# Patient Record
Sex: MALE | Race: BLACK OR AFRICAN AMERICAN | NOT HISPANIC OR LATINO | Employment: FULL TIME | URBAN - METROPOLITAN AREA
[De-identification: names, ages, dates, MRNs, and addresses within clinical notes are randomized per-mention and may not be internally consistent; named-entity substitution may affect disease eponyms.]

---

## 2019-10-28 ENCOUNTER — TELEPHONE (OUTPATIENT)
Dept: INTERNAL MEDICINE | Facility: CLINIC | Age: 34
End: 2019-10-28

## 2019-10-28 ENCOUNTER — OFFICE VISIT (OUTPATIENT)
Dept: INTERNAL MEDICINE | Facility: CLINIC | Age: 34
End: 2019-10-28
Payer: COMMERCIAL

## 2019-10-28 VITALS
DIASTOLIC BLOOD PRESSURE: 78 MMHG | WEIGHT: 176.56 LBS | SYSTOLIC BLOOD PRESSURE: 118 MMHG | HEIGHT: 70 IN | HEART RATE: 79 BPM | OXYGEN SATURATION: 97 % | BODY MASS INDEX: 25.28 KG/M2

## 2019-10-28 DIAGNOSIS — Z00.00 ENCOUNTER FOR ANNUAL GENERAL MEDICAL EXAMINATION WITHOUT ABNORMAL FINDINGS IN ADULT: Primary | ICD-10-CM

## 2019-10-28 DIAGNOSIS — M25.561 RIGHT KNEE PAIN, UNSPECIFIED CHRONICITY: ICD-10-CM

## 2019-10-28 PROCEDURE — 99999 PR PBB SHADOW E&M-NEW PATIENT-LVL III: ICD-10-PCS | Mod: PBBFAC,,, | Performed by: INTERNAL MEDICINE

## 2019-10-28 PROCEDURE — 99385 PR PREVENTIVE VISIT,NEW,18-39: ICD-10-PCS | Mod: S$GLB,,, | Performed by: INTERNAL MEDICINE

## 2019-10-28 PROCEDURE — 99999 PR PBB SHADOW E&M-NEW PATIENT-LVL III: CPT | Mod: PBBFAC,,, | Performed by: INTERNAL MEDICINE

## 2019-10-28 PROCEDURE — 99385 PREV VISIT NEW AGE 18-39: CPT | Mod: S$GLB,,, | Performed by: INTERNAL MEDICINE

## 2019-10-28 NOTE — PROGRESS NOTES
"    CHIEF COMPLAINT     Chief Complaint   Patient presents with    Establish Care       HPI     Jovannijoselyn Kerr is a 34 y.o. male here today for annual visit.    Overall patient is doing well. Here today to establish for care and address some deferred HM.       R knee pain  Acute injury in March. Reports planning and twisting. Reports hearing a pop at that time. Reports having a significant amount of pain initially. However, pain has improved. His concern is that he is continuing to have some instability and lots of catching and clicking. He has appt with orthopedist on Thursday to further evaluate this issue.    Personally Reviewed Patient's Medical, surgical, family and social hx. Changes updated in Epic.  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   otherwise negative    Health Maintenance:   Reviewed with patient  Due for the following:  Tetanus-     PHYSICAL EXAM     /78 (BP Location: Left arm, Patient Position: Sitting, BP Method: Large (Manual))   Pulse 79   Ht 5' 10" (1.778 m)   Wt 80.1 kg (176 lb 9.4 oz)   SpO2 97%   BMI 25.34 kg/m²     Gen: Well Appearing, NAD  HEENT: PERR, EOMI  Neck: FROM, no thyromegaly, no cervical adenopathy  CVD: RRR, no M/R/G  Pulm: Normal work of breathing, CTAB, no wheezing  Abd:  Soft, NT, ND non TTP, no mass  MSK: r. Knee, non tender, knee stable to varus and valgus strain, Negative Jack, negative lachman  Neuro: A&Ox3, gait normal, speech normal  Mood; Mood normal, behavior normal, thought process linear       LABS     Labs reviewed; Notable for    ASSESSMENT     1. Encounter for annual general medical examination without abnormal findings in adult  CBC auto differential    Comprehensive metabolic panel    Hemoglobin A1c    Lipid panel   2. Right knee pain, unspecified chronicity             Plan     Jovanni " Usha is a 34 y.o. male with  1. Encounter for annual general medical examination without abnormal findings in adult  Updated problem list, medical history, care team and discussed HM.   Will check a1c and lipid screening as age appropriate recs.  Will defer tetanus since not available in clinic today 2/2 recent power outage  Will contact pt with results  - CBC auto differential; Future  - Comprehensive metabolic panel; Future  - Hemoglobin A1c; Future  - Lipid panel; Future    2. Right knee pain, unspecified chronicity  Has ortho evaluation Thursday.    Oscar Ro MD

## 2019-10-28 NOTE — TELEPHONE ENCOUNTER
Called pt and received no answer, thus I left a message asking him if he would be able to make it in for his appt today. I instructed him to follow-up with our office to make another appt if needed and provided him with the number.    Salty Vega

## 2019-11-04 ENCOUNTER — TELEPHONE (OUTPATIENT)
Dept: SPORTS MEDICINE | Facility: CLINIC | Age: 34
End: 2019-11-04

## 2019-11-05 ENCOUNTER — HOSPITAL ENCOUNTER (OUTPATIENT)
Dept: RADIOLOGY | Facility: HOSPITAL | Age: 34
Discharge: HOME | End: 2019-11-05
Attending: ORTHOPAEDIC SURGERY
Payer: COMMERCIAL

## 2019-11-05 ENCOUNTER — OFFICE VISIT (OUTPATIENT)
Dept: SPORTS MEDICINE | Facility: CLINIC | Age: 34
End: 2019-11-05
Payer: COMMERCIAL

## 2019-11-05 VITALS
WEIGHT: 176 LBS | SYSTOLIC BLOOD PRESSURE: 127 MMHG | BODY MASS INDEX: 25.2 KG/M2 | DIASTOLIC BLOOD PRESSURE: 71 MMHG | HEART RATE: 68 BPM | HEIGHT: 70 IN

## 2019-11-05 DIAGNOSIS — M25.561 RIGHT KNEE PAIN, UNSPECIFIED CHRONICITY: ICD-10-CM

## 2019-11-05 DIAGNOSIS — M25.561 RIGHT KNEE PAIN, UNSPECIFIED CHRONICITY: Primary | ICD-10-CM

## 2019-11-05 DIAGNOSIS — M25.551 RIGHT HIP PAIN: ICD-10-CM

## 2019-11-05 PROCEDURE — 99203 PR OFFICE/OUTPT VISIT, NEW, LEVL III, 30-44 MIN: ICD-10-PCS | Mod: S$GLB,,, | Performed by: ORTHOPAEDIC SURGERY

## 2019-11-05 PROCEDURE — 99203 OFFICE O/P NEW LOW 30 MIN: CPT | Mod: S$GLB,,, | Performed by: ORTHOPAEDIC SURGERY

## 2019-11-05 PROCEDURE — 73502 X-RAY EXAM HIP UNI 2-3 VIEWS: CPT | Mod: 26,RT,, | Performed by: RADIOLOGY

## 2019-11-05 PROCEDURE — 73564 X-RAY EXAM KNEE 4 OR MORE: CPT | Mod: 26,50,, | Performed by: RADIOLOGY

## 2019-11-05 PROCEDURE — 73564 X-RAY EXAM KNEE 4 OR MORE: CPT | Mod: TC,50

## 2019-11-05 PROCEDURE — 3008F PR BODY MASS INDEX (BMI) DOCUMENTED: ICD-10-PCS | Mod: CPTII,S$GLB,, | Performed by: ORTHOPAEDIC SURGERY

## 2019-11-05 PROCEDURE — 99999 PR PBB SHADOW E&M-EST. PATIENT-LVL III: ICD-10-PCS | Mod: PBBFAC,,, | Performed by: ORTHOPAEDIC SURGERY

## 2019-11-05 PROCEDURE — 73564 XR KNEE ORTHO BILAT WITH FLEXION: ICD-10-PCS | Mod: 26,50,, | Performed by: RADIOLOGY

## 2019-11-05 PROCEDURE — 73502 XR HIP 2 VIEW RIGHT: ICD-10-PCS | Mod: 26,RT,, | Performed by: RADIOLOGY

## 2019-11-05 PROCEDURE — 73502 X-RAY EXAM HIP UNI 2-3 VIEWS: CPT | Mod: TC,RT

## 2019-11-05 PROCEDURE — 99999 PR PBB SHADOW E&M-EST. PATIENT-LVL III: CPT | Mod: PBBFAC,,, | Performed by: ORTHOPAEDIC SURGERY

## 2019-11-05 PROCEDURE — 3008F BODY MASS INDEX DOCD: CPT | Mod: CPTII,S$GLB,, | Performed by: ORTHOPAEDIC SURGERY

## 2019-11-05 NOTE — PROGRESS NOTES
CC: Right knee instability, right hip pain     34 y.o. Male who presents as a new patient to me. 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 Complaint is right knee instability since last March. States he was playing Frisbee and landed awkwardly with a varus load. Felt a pop and reports subsequent pain and swelling. Did not seek treatment. Pain and swelling resolved after 4-6 weeks. Since the injury - he states knee feels unstable with day to day activity. Unable to return to sports or physical activity due to this. No recurrent effusions. Prominent mechanical symptoms. Denies instability. Better with rest. Treatment thus far has included rest, activity modifications, oral medications. Here today to discuss diagnosis and treatment options.      Also mentions chronic R hip pain dating back to his high school track days. Reports history of an FLEX diagnosis in the past which was treated conservatively.     Pain Score: 0-No pain    REVIEW OF SYSTEMS:   Constitution: Negative. Negative for chills, fever and night sweats.    Hematologic/Lymphatic: Negative for bleeding problem. Does not bruise/bleed easily.   Skin: Negative for dry skin, itching and rash.   Musculoskeletal: Negative for falls. Positive for right knee instability, right hip pain.     All other review of symptoms were reviewed and found to be noncontributory.     PAST MEDICAL HISTORY:   History reviewed. No pertinent past medical history.    PAST SURGICAL HISTORY:   History reviewed. No pertinent surgical history.    FAMILY HISTORY:   Family History   Problem Relation Age of Onset    Hypertension Mother     Stroke Mother 60    Hypothyroidism Mother     Hypertension Father     Tremor Father     Polycystic kidney disease Sister         transplant as kid       SOCIAL HISTORY:   Social History     Socioeconomic History    Marital status: Single     Spouse name: Not on file    Number of children: Not on file    Years of education: Not on file    Highest education  "level: Not on file   Occupational History    Occupation: Fellow     Comment: [employer]   Social Needs    Financial resource strain: Not on file    Food insecurity:     Worry: Not on file     Inability: Not on file    Transportation needs:     Medical: Not on file     Non-medical: Not on file   Tobacco Use    Smoking status: Never Smoker    Smokeless tobacco: Never Used   Substance and Sexual Activity    Alcohol use: Yes     Frequency: 2-4 times a month     Drinks per session: 1 or 2    Drug use: Never    Sexual activity: Not on file   Lifestyle    Physical activity:     Days per week: Not on file     Minutes per session: Not on file    Stress: Not on file   Relationships    Social connections:     Talks on phone: Not on file     Gets together: Not on file     Attends Restorationism service: Not on file     Active member of club or organization: Not on file     Attends meetings of clubs or organizations: Not on file     Relationship status: Not on file   Other Topics Concern    Not on file   Social History Narrative    Not on file     MEDICATIONS:   No current outpatient medications on file.    ALLERGIES:   Review of patient's allergies indicates:  No Known Allergies     PHYSICAL EXAMINATION:  /71   Pulse 68   Ht 5' 10" (1.778 m)   Wt 79.8 kg (176 lb)   BMI 25.25 kg/m²   General: Well-developed well-nourished 34 y.o. malein no acute distress   Cardiovascular: Regular rhythm by palpation of distal pulse, normal color and temperature, no concerning varicosities on symptomatic side   Lungs: No labored breathing or wheezing appreciated   Neuro: Alert and oriented ×3   Psychiatric: well oriented to person, place and time, demonstrates normal mood and affect   Skin: No rashes, lesions or ulcers, normal temperature, turgor, and texture on involved extremity    Ortho/SPM Exam  Examination of the right knee demonstrates intact extensor mechanism. No effusion or prepatellar swelling. Central " patellar tracking. No patellar apprehension. Normal patellar mobility. Full extension. Flexion to 130. No pain with forced flexion or extension. No tenderness along the medial and lateral joint line. Positive Jack's for generalized pain. Positive Lachman, 2B. Randolph with pivot shift testing. Stable to varus/valgus stress testing at 0 and 30 deg. Negative posterior drawer. Negative Dial. NVI distally.     Examination of the right hip demonstrates +FADIR. Restricted IR to 25. +Circumduction test for pain. No mechanical symptoms. Negative logroll.    IMAGING:  X-rays including standing, weight bearing AP and flexion bilateral knees, RIGHT knee lateral and sunrise views ordered and images reviewed by me show:    No acute change. Well maintained joint spaces.     X-rays including AP/Lateral of the right hip ordered and reviewed by me. Images show:   There is mild impingement change    ASSESSMENT:      ICD-10-CM ICD-9-CM   1. Right knee pain, unspecified chronicity M25.561 719.46   2. Right hip pain M25.551 719.45    Suspected right knee ACL tear   Right FLEX    PLAN:     Findings discussed with the patient.  MRI of the right knee is indicated.  He has had chronic pain in regards to the hip as well. Known diagnosis of FLEX.  He failed prior conservative treatment.  He wishes to proceed with an MRI of the hip as well. This was ordered.  There is no correlation between FLEX I and ACL tears.  I will touch base with him via telephone after the studies to discuss results and treatment options      Procedures

## 2019-11-12 ENCOUNTER — HOSPITAL ENCOUNTER (OUTPATIENT)
Dept: RADIOLOGY | Facility: HOSPITAL | Age: 34
Discharge: HOME | End: 2019-11-12
Attending: ORTHOPAEDIC SURGERY
Payer: COMMERCIAL

## 2019-11-12 DIAGNOSIS — M25.561 RIGHT KNEE PAIN, UNSPECIFIED CHRONICITY: ICD-10-CM

## 2019-11-12 PROCEDURE — 73721 MRI JNT OF LWR EXTRE W/O DYE: CPT | Mod: TC,RT

## 2019-11-12 PROCEDURE — 73721 MRI JNT OF LWR EXTRE W/O DYE: CPT | Mod: 26,RT,, | Performed by: RADIOLOGY

## 2019-11-12 PROCEDURE — 73721 MRI KNEE WITHOUT CONTRAST RIGHT: ICD-10-PCS | Mod: 26,RT,, | Performed by: RADIOLOGY

## 2019-11-14 ENCOUNTER — ANESTHESIA EVENT (OUTPATIENT)
Dept: SURGERY | Facility: HOSPITAL | Age: 34
End: 2019-11-14
Payer: COMMERCIAL

## 2019-11-14 ENCOUNTER — TELEPHONE (OUTPATIENT)
Dept: SPORTS MEDICINE | Facility: CLINIC | Age: 34
End: 2019-11-14

## 2019-11-14 DIAGNOSIS — S83.511A RUPTURE OF ANTERIOR CRUCIATE LIGAMENT OF RIGHT KNEE, INITIAL ENCOUNTER: Primary | ICD-10-CM

## 2019-11-14 NOTE — PLAN OF CARE
Patient instructions given per surgical and medical guidelines. NPO status reviewed with patient. Patient verbalized understanding of both food and fluid intake prior to surgery.  Antibacterial scrub instructions given per MD recommendations.  All questions answered.  Driving directions given for day of surgery.  .

## 2019-11-14 NOTE — ANESTHESIA PREPROCEDURE EVALUATION
11/14/2019  Jovanni Kerr is a 34 y.o., male.    Anesthesia Evaluation    I have reviewed the Patient Summary Reports.    I have reviewed the Nursing Notes.      Review of Systems  Anesthesia Hx:  No previous Anesthesia  Neg history of prior surgery. Denies Family Hx of Anesthesia complications.   Denies Personal Hx of Anesthesia complications.   Social:  Social Alcohol Use  Denies Tobacco Use.   EENT/Dental:   Denies ear symptoms  Denies Nasal Symptoms.  Denies Active Dental Problems    Cardiovascular:  Cardiovascular Normal   Functional Capacity 4 METS  Denies Cardiomyopathy  Denies Deep Venous Thrombosis (DVT)  Denies Carotoid Artery Disease    Pulmonary:  Denies Asthma.    Renal/:  Denies Renal Symptoms/Infections/Stones    Hepatic/GI:  Denies Hepatic/GI Symptoms    Musculoskeletal:  Musculoskeletal Normal  Denies Bone Disorder    Neurological:  Neurology Normal  Denies Seizure Disorder  Denies Head Injury  Denies Spinal Cord Injury Denies Nerve Injury   Endocrine:  Denies Diabetes  Denies Thyroid Disease   Denies Pituitary Disease    Dermatological:  Denies Skin Symptoms/Problems   Psych:  Denies Attention Deficit Disorder .         Physical Exam  General:  Well nourished    Airway/Jaw/Neck:  Airway Findings: Mouth Opening: Normal Tongue: Normal  General Airway Assessment: Average  Mallampati: I  TM Distance: Normal, at least 6 cm  Jaw/Neck Findings:  Neck ROM: Normal ROM            Mental Status:  Mental Status Findings:  Alert and Oriented         Anesthesia Plan  Type of Anesthesia, risks & benefits discussed:  Anesthesia Type:  general, MAC, regional  Patient's Preference:   Intra-op Monitoring Plan: standard ASA monitors  Intra-op Monitoring Plan Comments:   Post Op Pain Control Plan:   Post Op Pain Control Plan Comments:   Induction:   IV  Beta Blocker:  Patient is not currently on a  Beta-Blocker (No further documentation required).       Informed Consent: Patient understands risks and agrees with Anesthesia plan.  Questions answered. Anesthesia consent signed with patient.  ASA Score: 1     Day of Surgery Review of History & Physical:    H&P update referred to the surgeon.         Ready For Surgery From Anesthesia Perspective.

## 2019-11-14 NOTE — TELEPHONE ENCOUNTER
----- Message from Sandra Chavira sent at 11/14/2019 11:49 AM CST -----  Contact: Self  Pt is needing a call back regarding upcoming surgery @ [phone]

## 2019-11-15 ENCOUNTER — TELEPHONE (OUTPATIENT)
Dept: SPORTS MEDICINE | Facility: CLINIC | Age: 34
End: 2019-11-15

## 2019-11-15 NOTE — TELEPHONE ENCOUNTER
I called and discussed the MRI results with the patient yesterday.  The study confirms a complete ACL tear which appears more chronic in variety.  Treatment options were discussed.  The patient has symptomatic instability episodes on a regular basis with activities of daily living and does have a desire to return to some recreational sports.  From that perspective certainly I would recommend surgical intervention. Graft options discussed.  Autograft recommended.  The patient has selected a bone-patellar tendon-bone autograft.    Plan is for right knee arthroscopic bone-patellar tendon-bone autograft ACL reconstruction with meniscus treatment as indicated.    The details of surgery were discussed to include the expected postop rehab and recovery course.  In particular I have discussed the risks of surgery which include graft retear or nonhealing, anterior knee pain, fracture, quadriceps weakness, contralateral ACL injury, inability return to previous level activity, arthritis, infection, DVT/PE, and potential need for further surgery among others.  The patient verbalized understanding and does wish to proceed. In fact, he wishes to proceed next Monday.  We will get him set up for this.  All questions answered.  No preoperative medical clearance needed.    Informed Consent:    The details of the surgical procedure were explained, including the location of probable incisions and a description of possible hardware and/or grafts to be used. Alternatives to both operative and non-operative options with associated risks and benefits were discussed. The patient understands the likely convalescence after surgery and, in particular, the expected postop rehab and recovery course. The outlined risks and potential complications of the proposed procedure include but are not limited to: infection, poor wound healing, scarring, deformity, stiffness, swelling, continued or recurrent pain, instability, hardware or prosthetic failure  if implanted, symptomatic hardware requiring removal, dislocation, weakness, neurovascular injury, numbness, chronic regional pain disorder, tissue nonhealing/irreparability/retear, subsequent contralateral limb injury or pathology, chondral injury, arthritis, fracture, blood clot formation, inability to return to previous level of activity, anesthetic or regional block complication up to death, need for additional procedure as indicated intraoperatively, and potential need for further surgery.    The patient was also informed and understands that the risks of surgery are greater for patients with a current condition or history of heart disease, obesity, clotting disorders, recurrent infections, steroid use, current or past smoking, and factors such as sedentary lifestyle and noncompliance with medications, therapy or follow-up. The degree of the increased risk is hard to estimate with any degree of precision. If applicable, smoking cessation was discussed.     All questions were answered. The patient has verbalized understanding of these issues and wishes to proceed with the surgery as discussed.

## 2019-11-18 ENCOUNTER — HOSPITAL ENCOUNTER (OUTPATIENT)
Facility: HOSPITAL | Age: 34
Discharge: HOME | End: 2019-11-18
Attending: ORTHOPAEDIC SURGERY | Admitting: ORTHOPAEDIC SURGERY
Payer: COMMERCIAL

## 2019-11-18 ENCOUNTER — DOCUMENTATION ONLY (OUTPATIENT)
Dept: SPORTS MEDICINE | Facility: CLINIC | Age: 34
End: 2019-11-18

## 2019-11-18 ENCOUNTER — ANESTHESIA (OUTPATIENT)
Dept: SURGERY | Facility: HOSPITAL | Age: 34
End: 2019-11-18
Payer: COMMERCIAL

## 2019-11-18 DIAGNOSIS — S83.519A ACL TEAR: ICD-10-CM

## 2019-11-18 DIAGNOSIS — Z98.890 S/P ACL RECONSTRUCTION: Primary | ICD-10-CM

## 2019-11-18 DIAGNOSIS — S83.511A RUPTURE OF ANTERIOR CRUCIATE LIGAMENT OF RIGHT KNEE, INITIAL ENCOUNTER: Primary | ICD-10-CM

## 2019-11-18 PROCEDURE — D9220A PRA ANESTHESIA: ICD-10-PCS | Mod: ANES,,, | Performed by: ANESTHESIOLOGY

## 2019-11-18 PROCEDURE — 64448 NJX AA&/STRD FEM NRV NFS IMG: CPT | Mod: 59,RT,, | Performed by: ANESTHESIOLOGY

## 2019-11-18 PROCEDURE — C1751 CATH, INF, PER/CENT/MIDLINE: HCPCS

## 2019-11-18 PROCEDURE — 63600175 PHARM REV CODE 636 W HCPCS: Performed by: ORTHOPAEDIC SURGERY

## 2019-11-18 PROCEDURE — 64448 ACC: ICD-10-PCS | Mod: 59,RT,, | Performed by: ANESTHESIOLOGY

## 2019-11-18 PROCEDURE — 29873 PR KNEE SCOPE, W/LATERAL RELEASE: ICD-10-PCS | Mod: 51,RT,, | Performed by: ORTHOPAEDIC SURGERY

## 2019-11-18 PROCEDURE — D9220A PRA ANESTHESIA: Mod: ANES,,, | Performed by: ANESTHESIOLOGY

## 2019-11-18 PROCEDURE — 25000003 PHARM REV CODE 250: Performed by: NURSE ANESTHETIST, CERTIFIED REGISTERED

## 2019-11-18 PROCEDURE — 94761 N-INVAS EAR/PLS OXIMETRY MLT: CPT

## 2019-11-18 PROCEDURE — D9220A PRA ANESTHESIA: Mod: CRNA,,, | Performed by: NURSE ANESTHETIST, CERTIFIED REGISTERED

## 2019-11-18 PROCEDURE — 76942 ECHO GUIDE FOR BIOPSY: CPT | Performed by: ANESTHESIOLOGY

## 2019-11-18 PROCEDURE — 25000003 PHARM REV CODE 250: Performed by: ANESTHESIOLOGY

## 2019-11-18 PROCEDURE — 37000008 HC ANESTHESIA 1ST 15 MINUTES: Performed by: ORTHOPAEDIC SURGERY

## 2019-11-18 PROCEDURE — 29888 PR KNEE SCOPE,AID ANT CRUCIATE REPAIR: ICD-10-PCS | Mod: RT,,, | Performed by: ORTHOPAEDIC SURGERY

## 2019-11-18 PROCEDURE — S0020 INJECTION, BUPIVICAINE HYDRO: HCPCS | Performed by: ANESTHESIOLOGY

## 2019-11-18 PROCEDURE — 36000710: Performed by: ORTHOPAEDIC SURGERY

## 2019-11-18 PROCEDURE — 63600175 PHARM REV CODE 636 W HCPCS: Performed by: NURSE ANESTHETIST, CERTIFIED REGISTERED

## 2019-11-18 PROCEDURE — 71000033 HC RECOVERY, INTIAL HOUR: Performed by: ORTHOPAEDIC SURGERY

## 2019-11-18 PROCEDURE — 71000039 HC RECOVERY, EACH ADD'L HOUR: Performed by: ORTHOPAEDIC SURGERY

## 2019-11-18 PROCEDURE — 29873 ARTHRS KNEE SURG W/LAT RLS: CPT | Mod: 51,RT,, | Performed by: ORTHOPAEDIC SURGERY

## 2019-11-18 PROCEDURE — 25000003 PHARM REV CODE 250: Performed by: ORTHOPAEDIC SURGERY

## 2019-11-18 PROCEDURE — 99900035 HC TECH TIME PER 15 MIN (STAT)

## 2019-11-18 PROCEDURE — 71000015 HC POSTOP RECOV 1ST HR: Performed by: ORTHOPAEDIC SURGERY

## 2019-11-18 PROCEDURE — 27100025 HC TUBING, SET FLUID WARMER: Performed by: NURSE ANESTHETIST, CERTIFIED REGISTERED

## 2019-11-18 PROCEDURE — 64450 IPACK: ICD-10-PCS | Mod: 59,RT,, | Performed by: ANESTHESIOLOGY

## 2019-11-18 PROCEDURE — 64450 NJX AA&/STRD OTHER PN/BRANCH: CPT | Performed by: ANESTHESIOLOGY

## 2019-11-18 PROCEDURE — 27200651 HC AIRWAY, LMA: Performed by: NURSE ANESTHETIST, CERTIFIED REGISTERED

## 2019-11-18 PROCEDURE — 27200750 HC INSULATED NEEDLE/ STIMUPLEX

## 2019-11-18 PROCEDURE — 29888 ARTHRS AID ACL RPR/AGMNTJ: CPT | Mod: RT,,, | Performed by: ORTHOPAEDIC SURGERY

## 2019-11-18 PROCEDURE — D9220A PRA ANESTHESIA: ICD-10-PCS | Mod: CRNA,,, | Performed by: NURSE ANESTHETIST, CERTIFIED REGISTERED

## 2019-11-18 PROCEDURE — 25000003 PHARM REV CODE 250

## 2019-11-18 PROCEDURE — 36000711: Performed by: ORTHOPAEDIC SURGERY

## 2019-11-18 PROCEDURE — 76942 ACC: ICD-10-PCS | Mod: 26,,, | Performed by: ANESTHESIOLOGY

## 2019-11-18 PROCEDURE — 27201423 OPTIME MED/SURG SUP & DEVICES STERILE SUPPLY: Performed by: ORTHOPAEDIC SURGERY

## 2019-11-18 PROCEDURE — C1713 ANCHOR/SCREW BN/BN,TIS/BN: HCPCS | Performed by: ORTHOPAEDIC SURGERY

## 2019-11-18 PROCEDURE — 37000009 HC ANESTHESIA EA ADD 15 MINS: Performed by: ORTHOPAEDIC SURGERY

## 2019-11-18 PROCEDURE — 63600175 PHARM REV CODE 636 W HCPCS: Performed by: ANESTHESIOLOGY

## 2019-11-18 PROCEDURE — 27800903 OPTIME MED/SURG SUP & DEVICES OTHER IMPLANTS: Performed by: ORTHOPAEDIC SURGERY

## 2019-11-18 DEVICE — SCREW CANNULATED 9 X 20MM: Type: IMPLANTABLE DEVICE | Site: KNEE | Status: FUNCTIONAL

## 2019-11-18 DEVICE — KIT SECONDARY FIX ACL PCL REP: Type: IMPLANTABLE DEVICE | Site: KNEE | Status: FUNCTIONAL

## 2019-11-18 DEVICE — FIBER CORTICAL ENHANCE 2.5CC: Type: IMPLANTABLE DEVICE | Site: KNEE | Status: FUNCTIONAL

## 2019-11-18 DEVICE — SCREW SHTH CANN INTFR 8X20MM: Type: IMPLANTABLE DEVICE | Site: KNEE | Status: FUNCTIONAL

## 2019-11-18 RX ORDER — ONDANSETRON 4 MG/1
4 TABLET, FILM COATED ORAL EVERY 8 HOURS PRN
Qty: 30 TABLET | Refills: 0 | Status: ON HOLD | OUTPATIENT
Start: 2019-11-18 | End: 2020-10-09 | Stop reason: HOSPADM

## 2019-11-18 RX ORDER — VANCOMYCIN HYDROCHLORIDE 500 MG/10ML
INJECTION, POWDER, LYOPHILIZED, FOR SOLUTION INTRAVENOUS
Status: DISCONTINUED | OUTPATIENT
Start: 2019-11-18 | End: 2019-11-18 | Stop reason: HOSPADM

## 2019-11-18 RX ORDER — BUPIVACAINE HYDROCHLORIDE 2.5 MG/ML
INJECTION, SOLUTION EPIDURAL; INFILTRATION; INTRACAUDAL
Status: DISCONTINUED
Start: 2019-11-18 | End: 2019-11-18 | Stop reason: HOSPADM

## 2019-11-18 RX ORDER — TRANEXAMIC ACID 100 MG/ML
INJECTION, SOLUTION INTRAVENOUS
Status: DISCONTINUED | OUTPATIENT
Start: 2019-11-18 | End: 2019-11-18

## 2019-11-18 RX ORDER — MORPHINE SULFATE 2 MG/ML
2 INJECTION, SOLUTION INTRAMUSCULAR; INTRAVENOUS EVERY 10 MIN PRN
Status: DISCONTINUED | OUTPATIENT
Start: 2019-11-18 | End: 2019-11-18 | Stop reason: HOSPADM

## 2019-11-18 RX ORDER — ONDANSETRON 2 MG/ML
INJECTION INTRAMUSCULAR; INTRAVENOUS
Status: DISCONTINUED | OUTPATIENT
Start: 2019-11-18 | End: 2019-11-18

## 2019-11-18 RX ORDER — MIDAZOLAM HYDROCHLORIDE 1 MG/ML
0.5 INJECTION INTRAMUSCULAR; INTRAVENOUS
Status: DISCONTINUED | OUTPATIENT
Start: 2019-11-18 | End: 2019-11-18 | Stop reason: HOSPADM

## 2019-11-18 RX ORDER — BUPIVACAINE HYDROCHLORIDE 2.5 MG/ML
INJECTION, SOLUTION INFILTRATION; PERINEURAL
Status: COMPLETED | OUTPATIENT
Start: 2019-11-18 | End: 2019-11-18

## 2019-11-18 RX ORDER — CELECOXIB 200 MG/1
400 CAPSULE ORAL
Status: COMPLETED | OUTPATIENT
Start: 2019-11-18 | End: 2019-11-18

## 2019-11-18 RX ORDER — MIDAZOLAM HYDROCHLORIDE 1 MG/ML
INJECTION, SOLUTION INTRAMUSCULAR; INTRAVENOUS
Status: DISCONTINUED | OUTPATIENT
Start: 2019-11-18 | End: 2019-11-18

## 2019-11-18 RX ORDER — ONDANSETRON 2 MG/ML
4 INJECTION INTRAMUSCULAR; INTRAVENOUS EVERY 12 HOURS PRN
Status: DISCONTINUED | OUTPATIENT
Start: 2019-11-18 | End: 2019-11-18 | Stop reason: HOSPADM

## 2019-11-18 RX ORDER — OXYCODONE HYDROCHLORIDE 5 MG/1
TABLET ORAL
Status: COMPLETED
Start: 2019-11-18 | End: 2019-11-18

## 2019-11-18 RX ORDER — DEXMEDETOMIDINE HYDROCHLORIDE 100 UG/ML
INJECTION, SOLUTION INTRAVENOUS
Status: DISCONTINUED | OUTPATIENT
Start: 2019-11-18 | End: 2019-11-18

## 2019-11-18 RX ORDER — LIDOCAINE HCL/PF 100 MG/5ML
SYRINGE (ML) INTRAVENOUS
Status: DISCONTINUED | OUTPATIENT
Start: 2019-11-18 | End: 2019-11-18

## 2019-11-18 RX ORDER — EPINEPHRINE 1 MG/ML
INJECTION INTRAMUSCULAR; INTRAVENOUS; SUBCUTANEOUS
Status: DISCONTINUED | OUTPATIENT
Start: 2019-11-18 | End: 2019-11-18 | Stop reason: HOSPADM

## 2019-11-18 RX ORDER — PROPOFOL 10 MG/ML
VIAL (ML) INTRAVENOUS
Status: DISCONTINUED | OUTPATIENT
Start: 2019-11-18 | End: 2019-11-18

## 2019-11-18 RX ORDER — HYDROMORPHONE HYDROCHLORIDE 1 MG/ML
0.2 INJECTION, SOLUTION INTRAMUSCULAR; INTRAVENOUS; SUBCUTANEOUS EVERY 5 MIN PRN
Status: DISCONTINUED | OUTPATIENT
Start: 2019-11-18 | End: 2019-11-18 | Stop reason: HOSPADM

## 2019-11-18 RX ORDER — DEXAMETHASONE SODIUM PHOSPHATE 4 MG/ML
INJECTION, SOLUTION INTRA-ARTICULAR; INTRALESIONAL; INTRAMUSCULAR; INTRAVENOUS; SOFT TISSUE
Status: DISCONTINUED | OUTPATIENT
Start: 2019-11-18 | End: 2019-11-18

## 2019-11-18 RX ORDER — PROMETHAZINE HYDROCHLORIDE 25 MG/1
25 TABLET ORAL EVERY 6 HOURS PRN
Status: DISCONTINUED | OUTPATIENT
Start: 2019-11-18 | End: 2019-11-18 | Stop reason: HOSPADM

## 2019-11-18 RX ORDER — MUPIROCIN 20 MG/G
OINTMENT TOPICAL
Status: DISCONTINUED | OUTPATIENT
Start: 2019-11-18 | End: 2019-11-18 | Stop reason: HOSPADM

## 2019-11-18 RX ORDER — LORAZEPAM 2 MG/ML
0.25 INJECTION INTRAMUSCULAR ONCE AS NEEDED
Status: DISCONTINUED | OUTPATIENT
Start: 2019-11-18 | End: 2019-11-18 | Stop reason: HOSPADM

## 2019-11-18 RX ORDER — DIPHENHYDRAMINE HYDROCHLORIDE 50 MG/ML
25 INJECTION INTRAMUSCULAR; INTRAVENOUS EVERY 6 HOURS PRN
Status: DISCONTINUED | OUTPATIENT
Start: 2019-11-18 | End: 2019-11-18 | Stop reason: HOSPADM

## 2019-11-18 RX ORDER — DEXAMETHASONE SODIUM PHOSPHATE 10 MG/ML
INJECTION INTRAMUSCULAR; INTRAVENOUS
Status: DISCONTINUED
Start: 2019-11-18 | End: 2019-11-18 | Stop reason: HOSPADM

## 2019-11-18 RX ORDER — SODIUM CHLORIDE 9 MG/ML
INJECTION, SOLUTION INTRAVENOUS CONTINUOUS
Status: DISCONTINUED | OUTPATIENT
Start: 2019-11-18 | End: 2019-11-18 | Stop reason: HOSPADM

## 2019-11-18 RX ORDER — SODIUM CHLORIDE 9 MG/ML
INJECTION, SOLUTION INTRAVENOUS CONTINUOUS
Status: CANCELLED | OUTPATIENT
Start: 2019-11-18

## 2019-11-18 RX ORDER — GLYCOPYRROLATE 0.2 MG/ML
INJECTION INTRAMUSCULAR; INTRAVENOUS
Status: DISCONTINUED | OUTPATIENT
Start: 2019-11-18 | End: 2019-11-18

## 2019-11-18 RX ORDER — OXYCODONE AND ACETAMINOPHEN 5; 325 MG/1; MG/1
1 TABLET ORAL
Qty: 28 TABLET | Refills: 0 | Status: ON HOLD | OUTPATIENT
Start: 2019-11-18 | End: 2020-10-09 | Stop reason: HOSPADM

## 2019-11-18 RX ORDER — OXYCODONE HYDROCHLORIDE 5 MG/1
10 TABLET ORAL EVERY 4 HOURS PRN
Status: DISCONTINUED | OUTPATIENT
Start: 2019-11-18 | End: 2019-11-18 | Stop reason: HOSPADM

## 2019-11-18 RX ORDER — BUPIVACAINE HYDROCHLORIDE 2.5 MG/ML
INJECTION, SOLUTION EPIDURAL; INFILTRATION; INTRACAUDAL
Status: DISCONTINUED | OUTPATIENT
Start: 2019-11-18 | End: 2019-11-18 | Stop reason: HOSPADM

## 2019-11-18 RX ORDER — OXYCODONE HYDROCHLORIDE 5 MG/1
5-10 TABLET ORAL EVERY 6 HOURS PRN
Qty: 28 TABLET | Refills: 0 | Status: CANCELLED | OUTPATIENT
Start: 2019-11-18

## 2019-11-18 RX ORDER — KETAMINE HCL IN 0.9 % NACL 50 MG/5 ML
SYRINGE (ML) INTRAVENOUS
Status: DISCONTINUED | OUTPATIENT
Start: 2019-11-18 | End: 2019-11-18

## 2019-11-18 RX ORDER — MUPIROCIN 20 MG/G
OINTMENT TOPICAL
Status: CANCELLED | OUTPATIENT
Start: 2019-11-18

## 2019-11-18 RX ORDER — CEFAZOLIN SODIUM 1 G/3ML
2 INJECTION, POWDER, FOR SOLUTION INTRAMUSCULAR; INTRAVENOUS
Status: DISCONTINUED | OUTPATIENT
Start: 2019-11-18 | End: 2019-11-18 | Stop reason: HOSPADM

## 2019-11-18 RX ORDER — FENTANYL CITRATE 50 UG/ML
25 INJECTION, SOLUTION INTRAMUSCULAR; INTRAVENOUS EVERY 5 MIN PRN
Status: DISCONTINUED | OUTPATIENT
Start: 2019-11-18 | End: 2019-11-18 | Stop reason: HOSPADM

## 2019-11-18 RX ORDER — ASPIRIN 81 MG/1
81 TABLET ORAL 2 TIMES DAILY
Qty: 28 TABLET | Refills: 0 | OUTPATIENT
Start: 2019-11-18 | End: 2019-12-02

## 2019-11-18 RX ORDER — ACETAMINOPHEN 500 MG
1000 TABLET ORAL
Status: COMPLETED | OUTPATIENT
Start: 2019-11-18 | End: 2019-11-18

## 2019-11-18 RX ADMIN — PROPOFOL 200 MG: 10 INJECTION, EMULSION INTRAVENOUS at 03:11

## 2019-11-18 RX ADMIN — LIDOCAINE HYDROCHLORIDE 100 MG: 20 INJECTION, SOLUTION INTRAVENOUS at 03:11

## 2019-11-18 RX ADMIN — ACETAMINOPHEN 1000 MG: 500 TABLET ORAL at 01:11

## 2019-11-18 RX ADMIN — MIDAZOLAM HYDROCHLORIDE 2 MG: 1 INJECTION, SOLUTION INTRAMUSCULAR; INTRAVENOUS at 02:11

## 2019-11-18 RX ADMIN — CELECOXIB 400 MG: 200 CAPSULE ORAL at 01:11

## 2019-11-18 RX ADMIN — TRANEXAMIC ACID 1000 MG: 100 INJECTION, SOLUTION INTRAVENOUS at 03:11

## 2019-11-18 RX ADMIN — FENTANYL CITRATE 100 MCG: 50 INJECTION, SOLUTION INTRAMUSCULAR; INTRAVENOUS at 02:11

## 2019-11-18 RX ADMIN — BUPIVACAINE HYDROCHLORIDE 20 ML: 2.5 INJECTION, SOLUTION INFILTRATION; PERINEURAL at 02:11

## 2019-11-18 RX ADMIN — ROPIVACAINE HYDROCHLORIDE: 2 INJECTION, SOLUTION EPIDURAL; INFILTRATION at 06:11

## 2019-11-18 RX ADMIN — GLYCOPYRROLATE 0.2 MG: 0.2 INJECTION, SOLUTION INTRAMUSCULAR; INTRAVENOUS at 03:11

## 2019-11-18 RX ADMIN — Medication 50 MG: at 03:11

## 2019-11-18 RX ADMIN — DEXMEDETOMIDINE HYDROCHLORIDE 10 MCG: 100 INJECTION, SOLUTION, CONCENTRATE INTRAVENOUS at 03:11

## 2019-11-18 RX ADMIN — DEXAMETHASONE SODIUM PHOSPHATE 8 MG: 4 INJECTION, SOLUTION INTRAMUSCULAR; INTRAVENOUS at 03:11

## 2019-11-18 RX ADMIN — OXYCODONE HYDROCHLORIDE 10 MG: 5 TABLET ORAL at 07:11

## 2019-11-18 RX ADMIN — ONDANSETRON 4 MG: 2 INJECTION INTRAMUSCULAR; INTRAVENOUS at 05:11

## 2019-11-18 RX ADMIN — DEXMEDETOMIDINE HYDROCHLORIDE 10 MCG: 100 INJECTION, SOLUTION, CONCENTRATE INTRAVENOUS at 04:11

## 2019-11-18 RX ADMIN — SODIUM CHLORIDE, SODIUM GLUCONATE, SODIUM ACETATE, POTASSIUM CHLORIDE, MAGNESIUM CHLORIDE, SODIUM PHOSPHATE, DIBASIC, AND POTASSIUM PHOSPHATE: .53; .5; .37; .037; .03; .012; .00082 INJECTION, SOLUTION INTRAVENOUS at 03:11

## 2019-11-18 RX ADMIN — MIDAZOLAM 2 MG: 1 INJECTION INTRAMUSCULAR; INTRAVENOUS at 03:11

## 2019-11-18 RX ADMIN — SODIUM CHLORIDE 1000 ML: 0.9 INJECTION, SOLUTION INTRAVENOUS at 01:11

## 2019-11-18 RX ADMIN — CEFAZOLIN 2 G: 330 INJECTION, POWDER, FOR SOLUTION INTRAMUSCULAR; INTRAVENOUS at 03:11

## 2019-11-18 NOTE — INTERVAL H&P NOTE
The patient has been examined and the H&P has been reviewed:    I concur with the findings and no changes have occurred since H&P was written.    Anesthesia/Surgery risks, benefits and alternative options discussed and understood by patient/family.          Active Hospital Problems    Diagnosis  POA    ACL tear [S83.519A]  Yes      Resolved Hospital Problems   No resolved problems to display.

## 2019-11-18 NOTE — H&P (VIEW-ONLY)
Jovanni Kerr  is here for a completion of his perioperative paperwork. he  Is scheduled to undergo R knee ACL reconstruction with BTB autograft on 11/18/19.  He is a healthy individual and does not need clearance for this procedure.     Risks, indications and benefits of the surgical procedure were discussed with the patient. All questions with regard to surgery, rehab, expected return to functional activities, activities of daily living and recreational endeavors were answered to his satisfaction.    Patient was informed and understands the risks of surgery are greater for patients with a current condition or hx of heart disease, obesity, clotting disorders, recurrent infections, steroid use, current or past smoking, and factors such as sedentary lifestyle and noncompliance with medications, therapy or f/u. The degree of the increased risk is hard to estimate w/ any degree of precision.    Once no other questions were asked, a brief history and physical exam was then performed.    PAST MEDICAL HISTORY: No past medical history on file.  PAST SURGICAL HISTORY: No past surgical history on file.  FAMILY HISTORY:   Family History   Problem Relation Age of Onset    Hypertension Mother     Stroke Mother 60    Hypothyroidism Mother     Hypertension Father     Tremor Father     Polycystic kidney disease Sister         transplant as kid     SOCIAL HISTORY:   Social History     Socioeconomic History    Marital status: Single     Spouse name: Not on file    Number of children: Not on file    Years of education: Not on file    Highest education level: Not on file   Occupational History    Occupation: Fellow     Comment: [employer]   Social Needs    Financial resource strain: Not on file    Food insecurity:     Worry: Not on file     Inability: Not on file    Transportation needs:     Medical: Not on file     Non-medical: Not on file   Tobacco Use    Smoking status: Never Smoker    Smokeless tobacco:  Never Used   Substance and Sexual Activity    Alcohol use: Yes     Frequency: 2-4 times a month     Drinks per session: 1 or 2    Drug use: Never    Sexual activity: Not on file   Lifestyle    Physical activity:     Days per week: Not on file     Minutes per session: Not on file    Stress: Not on file   Relationships    Social connections:     Talks on phone: Not on file     Gets together: Not on file     Attends Taoist service: Not on file     Active member of club or organization: Not on file     Attends meetings of clubs or organizations: Not on file     Relationship status: Not on file   Other Topics Concern    Not on file   Social History Narrative    Not on file       MEDICATIONS: No current outpatient medications on file.  ALLERGIES: Review of patient's allergies indicates:  No Known Allergies    Review of Systems   Constitution: Negative. Negative for chills, fever and night sweats.   HENT: Negative for congestion and headaches.    Eyes: Negative for blurred vision, left vision loss and right vision loss.   Cardiovascular: Negative for chest pain and syncope.   Respiratory: Negative for cough and shortness of breath.    Endocrine: Negative for polydipsia, polyphagia and polyuria.   Hematologic/Lymphatic: Negative for bleeding problem. Does not bruise/bleed easily.   Skin: Negative for dry skin, itching and rash.   Musculoskeletal: Negative for falls and muscle weakness.   Gastrointestinal: Negative for abdominal pain and bowel incontinence.   Genitourinary: Negative for bladder incontinence and nocturia.   Neurological: Negative for disturbances in coordination, loss of balance and seizures.   Psychiatric/Behavioral: Negative for depression. The patient does not have insomnia.    Allergic/Immunologic: Negative for hives and persistent infections.     PHYSICAL EXAM:  GEN: A&Ox3, WD WN NAD  HEENT: WNL  CHEST: CTAB, no W/R/R  HEART: RRR, no M/R/G   ABD: Soft, NT ND, BS x4 QUADS  MS: Refer to previous  note for detailed MS exam  NEURO: CN II-XII intact       The surgical consent was then reviewed with the patient, who agreed with all the contents of the consent form and it was signed.     PHYSICAL THERAPY:  He was also instructed regarding physical therapy and will begin on POD#1-3. He is doing physical therapy at [facility].    POST OP CARE: Instructions were reviewed including care of the wound and dressing after surgery and when he can shower.     PAIN MANAGEMENT: Jovanni Kerr was instructed regarding the Polar ice unit that will be in place after surgery and his postoperative pain medications.     MEDICATION:  Roxicodone 5 mg 1-2 q 4 hours PRN for pain  Zofran 4 mg q 8 hours PRN for nausea and vomiting.  Aspirin 81mg BID x 2 weeks for DVT prophylaxis starting on the evening after surgery.      Post op meds to be delivered bedside prior to discharge. Deliver to family if patient is in surgery at 5pm.     Patient was instructed to purchase and take Colace to counter possible GI side effects of taking opiates.     DVT prophylaxis was discussed with the patient today including risk factors for developing DVTs and history of DVTs. The patient was asked if any specific recommendations were given from the doctor/s that did pre-operative surgical clearance.      If the patient was previously taking 81mg baby aspirin, they were told to not take additional baby aspirin, using the above stated aspirin and to restart the 81mg aspirin daily after completion of the aspirin dose.      Patient was also told to buy over the counter Prilosec medication and take it once daily for GI protection as long as they are taking NSAIDs or Aspirin.     The patient was told that narcotic pain medications may make them drowsy and instructions were given to not sign legal documents, drive or operate heavy machinery, cars, or equipment while under the influence of narcotic medications.     Dr. Álvarez was  present in clinic during this pre-op evaluation. The patient was offered the opportunity to ask Dr. Álvarez any further questions regarding the procedure which may not have been addressed during their previous informed consent discussion. The patient has declined to see Dr. Álvarez today.    As there were no other questions to be asked, he was given my business card along with Dr. Álvarez's business card if he has any questions or concerns prior to surgery or in the postop period.

## 2019-11-18 NOTE — ANESTHESIA PROCEDURE NOTES
Acc    Patient location during procedure: pre-op   Block not for primary anesthetic.  Reason for block: at surgeon's request and post-op pain management   Post-op Pain Location: right knee  Start time: 11/18/2019 2:23 PM  Timeout: 11/18/2019 2:22 PM   End time: 11/18/2019 2:42 PM    Staffing  Authorizing Provider: Chun Mehta MD  Performing Provider: Chun Mehta MD    Preanesthetic Checklist  Completed: patient identified, site marked, surgical consent, pre-op evaluation, timeout performed, IV checked, risks and benefits discussed and monitors and equipment checked  Peripheral Block  Patient position: supine  Prep: ChloraPrep and site prepped and draped  Patient monitoring: heart rate, cardiac monitor, continuous pulse ox, continuous capnometry and frequent blood pressure checks  Block type: adductor canal  Laterality: right  Injection technique: continuous  Needle  Needle type: Tuohy   Needle gauge: 17 G  Needle length: 3.5 in  Needle localization: anatomical landmarks and ultrasound guidance  Catheter type: spring wound  Catheter size: 19 G  Test dose: lidocaine 1.5% with Epi 1-to-200,000 and negative   -ultrasound image captured on disc.  Assessment  Injection assessment: negative aspiration, negative parasthesia and local visualized surrounding nerve  Paresthesia pain: none  Heart rate change: no  Slow fractionated injection: yes  Additional Notes  VSS.  [facility] RN monitoring vitals throughout procedure.  Patient tolerated procedure well.

## 2019-11-18 NOTE — ANESTHESIA PROCEDURE NOTES
Ipack    Patient location during procedure: pre-op   Block not for primary anesthetic.  Reason for block: at surgeon's request and post-op pain management   Post-op Pain Location: right knee  Start time: 11/18/2019 2:23 PM  Timeout: 11/18/2019 2:22 PM   End time: 11/18/2019 2:42 PM    Staffing  Authorizing Provider: Chun Mehta MD  Performing Provider: Chun Mehta MD    Preanesthetic Checklist  Completed: patient identified, site marked, surgical consent, pre-op evaluation, timeout performed, IV checked, risks and benefits discussed and monitors and equipment checked  Peripheral Block  Patient position: supine  Prep: ChloraPrep  Patient monitoring: heart rate, cardiac monitor, continuous pulse ox, continuous capnometry and frequent blood pressure checks  Block type: I PACK  Laterality: right  Injection technique: single shot  Needle  Needle type: Stimuplex   Needle gauge: 21 G  Needle length: 4 in  Needle localization: anatomical landmarks and ultrasound guidance   -ultrasound image captured on disc.  Assessment  Injection assessment: negative aspiration, negative parasthesia and local visualized surrounding nerve  Paresthesia pain: none  Heart rate change: no  Slow fractionated injection: yes  Additional Notes  VSS.  [facility] RN monitoring vitals throughout procedure.  Patient tolerated procedure well.

## 2019-11-18 NOTE — PROGRESS NOTES
Jovanni Kerr  is here for a completion of his perioperative paperwork. he  Is scheduled to undergo R knee ACL reconstruction with BTB autograft on 11/18/19.  He is a healthy individual and does not need clearance for this procedure.     Risks, indications and benefits of the surgical procedure were discussed with the patient. All questions with regard to surgery, rehab, expected return to functional activities, activities of daily living and recreational endeavors were answered to his satisfaction.    Patient was informed and understands the risks of surgery are greater for patients with a current condition or hx of heart disease, obesity, clotting disorders, recurrent infections, steroid use, current or past smoking, and factors such as sedentary lifestyle and noncompliance with medications, therapy or f/u. The degree of the increased risk is hard to estimate w/ any degree of precision.    Once no other questions were asked, a brief history and physical exam was then performed.    PAST MEDICAL HISTORY: No past medical history on file.  PAST SURGICAL HISTORY: No past surgical history on file.  FAMILY HISTORY:   Family History   Problem Relation Age of Onset    Hypertension Mother     Stroke Mother 60    Hypothyroidism Mother     Hypertension Father     Tremor Father     Polycystic kidney disease Sister         transplant as kid     SOCIAL HISTORY:   Social History     Socioeconomic History    Marital status: Single     Spouse name: Not on file    Number of children: Not on file    Years of education: Not on file    Highest education level: Not on file   Occupational History    Occupation: Fellow     Comment: [employer]   Social Needs    Financial resource strain: Not on file    Food insecurity:     Worry: Not on file     Inability: Not on file    Transportation needs:     Medical: Not on file     Non-medical: Not on file   Tobacco Use    Smoking status: Never Smoker    Smokeless tobacco:  Never Used   Substance and Sexual Activity    Alcohol use: Yes     Frequency: 2-4 times a month     Drinks per session: 1 or 2    Drug use: Never    Sexual activity: Not on file   Lifestyle    Physical activity:     Days per week: Not on file     Minutes per session: Not on file    Stress: Not on file   Relationships    Social connections:     Talks on phone: Not on file     Gets together: Not on file     Attends [employer] service: Not on file     Active member of club or organization: Not on file     Attends meetings of clubs or organizations: Not on file     Relationship status: Not on file   Other Topics Concern    Not on file   Social History Narrative    Not on file       MEDICATIONS: No current outpatient medications on file.  ALLERGIES: Review of patient's allergies indicates:  No Known Allergies    Review of Systems   Constitution: Negative. Negative for chills, fever and night sweats.   HENT: Negative for congestion and headaches.    Eyes: Negative for blurred vision, left vision loss and right vision loss.   Cardiovascular: Negative for chest pain and syncope.   Respiratory: Negative for cough and shortness of breath.    Endocrine: Negative for polydipsia, polyphagia and polyuria.   Hematologic/Lymphatic: Negative for bleeding problem. Does not bruise/bleed easily.   Skin: Negative for dry skin, itching and rash.   Musculoskeletal: Negative for falls and muscle weakness.   Gastrointestinal: Negative for abdominal pain and bowel incontinence.   Genitourinary: Negative for bladder incontinence and nocturia.   Neurological: Negative for disturbances in coordination, loss of balance and seizures.   Psychiatric/Behavioral: Negative for depression. The patient does not have insomnia.    Allergic/Immunologic: Negative for hives and persistent infections.     PHYSICAL EXAM:  GEN: A&Ox3, WD WN NAD  HEENT: WNL  CHEST: CTAB, no W/R/R  HEART: RRR, no M/R/G   ABD: Soft, NT ND, BS x4 QUADS  MS: Refer to previous  note for detailed MS exam  NEURO: CN II-XII intact       The surgical consent was then reviewed with the patient, who agreed with all the contents of the consent form and it was signed.     PHYSICAL THERAPY:  He was also instructed regarding physical therapy and will begin on POD#1-3. He is doing physical therapy at [facility].    POST OP CARE: Instructions were reviewed including care of the wound and dressing after surgery and when he can shower.     PAIN MANAGEMENT: Jovanni Kerr was instructed regarding the Polar ice unit that will be in place after surgery and his postoperative pain medications.     MEDICATION:  Roxicodone 5 mg 1-2 q 4 hours PRN for pain  Zofran 4 mg q 8 hours PRN for nausea and vomiting.  Aspirin 81mg BID x 2 weeks for DVT prophylaxis starting on the evening after surgery.      Post op meds to be delivered bedside prior to discharge. Deliver to family if patient is in surgery at 5pm.     Patient was instructed to purchase and take Colace to counter possible GI side effects of taking opiates.     DVT prophylaxis was discussed with the patient today including risk factors for developing DVTs and history of DVTs. The patient was asked if any specific recommendations were given from the doctor/s that did pre-operative surgical clearance.      If the patient was previously taking 81mg baby aspirin, they were told to not take additional baby aspirin, using the above stated aspirin and to restart the 81mg aspirin daily after completion of the aspirin dose.      Patient was also told to buy over the counter Prilosec medication and take it once daily for GI protection as long as they are taking NSAIDs or Aspirin.     The patient was told that narcotic pain medications may make them drowsy and instructions were given to not sign legal documents, drive or operate heavy machinery, cars, or equipment while under the influence of narcotic medications.     Dr. Álvarez was  present in clinic during this pre-op evaluation. The patient was offered the opportunity to ask Dr. Álvarez any further questions regarding the procedure which may not have been addressed during their previous informed consent discussion. The patient has declined to see Dr. Álvaerz today.    As there were no other questions to be asked, he was given my business card along with Dr. Álvarez's business card if he has any questions or concerns prior to surgery or in the postop period.

## 2019-11-19 ENCOUNTER — CLINICAL SUPPORT (OUTPATIENT)
Dept: REHABILITATION | Facility: HOSPITAL | Age: 34
End: 2019-11-19
Payer: COMMERCIAL

## 2019-11-19 DIAGNOSIS — M25.561 ACUTE PAIN OF RIGHT KNEE: ICD-10-CM

## 2019-11-19 PROCEDURE — 97161 PT EVAL LOW COMPLEX 20 MIN: CPT

## 2019-11-19 PROCEDURE — 97110 THERAPEUTIC EXERCISES: CPT

## 2019-11-19 NOTE — DISCHARGE INSTRUCTIONS
Recovery After Procedural Sedation (Adult)  You have been given medicine by vein to make you sleep during your surgery. This may have included both a pain medicine and sleeping medicine. Most of the effects have worn off. But you may still have some drowsiness for the next 6 to 8 hours.  Home care  Follow these guidelines when you get home:  · For the next 8 hours, you should be watched by a responsible adult. This person should make sure your condition is not getting worse.  · Don't drink any alcohol for the next 24 hours.  · Don't drive, operate dangerous machinery, or make important business or personal decisions during the next 24 hours.  Note: Your healthcare provider may tell you not to take any medicine by mouth for pain or sleep in the next 4 hours. These medicines may react with the medicines you were given in the hospital. This could cause a much stronger response than usual.  Follow-up care  Follow up with your healthcare provider if you are not alert and back to your usual level of activity within 12 hours.  When to seek medical advice  Call your healthcare provider right away if any of these occur:  · Drowsiness gets worse  · Weakness or dizziness gets worse  · Repeated vomiting  · You can't be awakened   Date Last Reviewed: 10/18/2016  © 6055-9908 [employer]. [address] [place] [address], [place] [address]. All rights reserved. This information is not intended as a substitute for professional medical care. Always follow your healthcare professional's instructions.

## 2019-11-19 NOTE — TRANSFER OF CARE
"Anesthesia Transfer of Care Note    Patient: Jovanni Kerr    Procedure(s) Performed: Procedure(s) (LRB):  RECONSTRUCTION, KNEE, ACL, ARTHROSCOPIC (Right)    Patient location: PACU    Anesthesia Type: general    Transport from OR: Transported from OR on 100% O2 by closed face mask with adequate spontaneous ventilation    Post pain: adequate analgesia    Post assessment: no apparent anesthetic complications and tolerated procedure well    Post vital signs: stable    Level of consciousness: awake    Nausea/Vomiting: no nausea/vomiting    Complications: none    Transfer of care protocol was followed      Last vitals:   Visit Vitals  /60 (BP Location: Right arm, Patient Position: Lying)   Pulse 77   Temp 36.6 °C (97.9 °F) (Oral)   Resp 15   Ht 5' 10" (1.778 m)   Wt 79.4 kg (175 lb)   SpO2 100%   BMI 25.11 kg/m²     "

## 2019-11-19 NOTE — OP NOTE
[facility]   OPERATIVE REPORT   ORTHOPAEDIC SURGERY   PROVIDER: DR. TIMO MCGEE    PATIENT INFORMATION   Jovanni Kerr 34 y.o. 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  Right knee lateral plica band   Right knee tight lateral retinaculum with lateral patellar tilt    PROCEDURES PERFORMED:   Right knee arthroscopic bone patellar tendon bone autograft ACL reconstruction (CPT 22100)  Right knee arthroscopic lateral plica band resection (CPT 83884)  Right knee arthroscopic partial lateral release (CPT 91520)    Surgeon(s) and Role:     * JOSEF Mcgee MD - Primary     * Jon Zendejas MD - Fellow     * SMA Priyanka    ANESTHESIA: General with adductor block and local injection (0.25% Marcaine plain)    ESTIMATED BLOOD LOSS: 100 cc    IMPLANTS:   Implant Name Type Inv. Item Serial No.  Lot No. LRB No. Used   SCREW SHTH ADAM INTFR 8X20MM - [id number]  SCREW SHTH ADAM INTFR 8X20MM  [employer] [id number] Right 1   SCREW CANNULATED 9 X 20MM - [id number]  SCREW CANNULATED 9 X 20MM  [employer] [id number] Right 1   KIT SECONDARY FIX ACL PCL REP - [id number]  KIT SECONDARY FIX ACL PCL REP  [employer] [id number] Right 1   [id number]  [employer] M [id number]  Right 1   [id number]  [employer] M [id number] MTF  Right 1        FINDINGS: Complete, chronic appearing avulsion of the ACL from its femoral insertion. Thickened lateral patellar retinaculum with lateral patellar tilt. Prominent lateral plica band correlating with palpable snapping over this area with passive motion during the intraoperative EUA.    SPECIMENS:   Specimen (12h ago, onward)    None        COMPLICATIONS: None.     INTRAOPERATIVE COUNTS: Correct.      PROPHYLACTIC IV ANTIBIOTICS: Given per [facility] Protocol.    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 He presented to me recently with complaint of right knee pain and instability since last March after a non-contact jump with varus load injury while playing frisbee. Exam and imaging have demonstrated a chronic ACL tear with functional deficiency. The patient has been indicated for surgery. The details of the above stated procedure were discussed at length to include the expected postop rehab and recovery course. Outlined risks and potential complications include but are not limited to infection, stiffness, graft tear, contralateral knee ligament tear, fracture, neurovascular injury, blood clot formation, hardware failure, instability, and inability to return to previous level of activity. Prior to proceeding with surgery, the patient participated in physical therapy to regain quadriceps function, allow time to decrease swelling, and reconstitute range of motion. Graft options were reviewed at length and the patient has chosen bone patellar tendon bone autograft.     DETAILS OF THE PROCEDURE: After permit was obtained for the above procedure and regional block was performed, the patient received prophylactic IV antibiotics in preop holding and was brought back to the operating room and placed under general anesthesia.      Examination under anesthesia of the right knee demonstrated: passive range of motion 0 to 130 degrees, Lachman grade 2B, negative posterior drawer, 1+ pivot shift, stable to varus and valgus stress at 0 and 30.     The operative knee and leg were prescrubbed, sterilely prepped and draped in routine fashion.      Verbal timeout was performed to confirm the patient's identity, correct operative site and planned operative procedure.     Planned incisions were marked out and the leg was exsanguinated with an esmarch. The  previously placed non-sterile tourniquet was elevated to 300 mmHg. The anterior paramedian incision was made. The paratenon layer was identified and carefully incised over the tendon midline. This tissue layer was reflected with metzenbaumm scissors and preserved. The knee was brought into flexion and an Arthrex parallel knife blade was used to incise the middle 10 mm of the tendon. A 10 x 25 mm bone plug was harvested from the tibial tubercle attachment. A 10 x 15 mm plug was harvested from the distal pole patella. Care was taken to limit cross-hatching or creation of a stress rise during patellar harvest in particular. Following harvest, the graft was taken to the back table and prepared. The patellar plug was bulleted and both plugs were trimmed to ensure smooth passage through a 10 mm tunnel. Drill holes with passing sutures were positioned over the plugs.      Attention was turned towards the arthroscopic procedure. The anterolateral and anteromedial scope portals were made through the anterior skin incision. The scope was introduced and diagnostic arthroscopy was performed. No chondral defects, wear or meniscal pathology were encountered. A modified Gillquist maneuver was performed to visualize the posteromedial recess and confirm no RAMP lesion. The ACL was found to be completely torn. The PCL was intact. The patient was noted to have a prominent lateral plica band which correlated well with a palpable snapping over that area on EUA. The band was resected using the shaver. Additionally, the patient was noted to have lateral patellar tilt with a thickened lateral patellar retinaculum with adhesions in the area. These were taken down and the pericapsular tissue was released with the cautery device for partial lateral release. This decreased the lateral tilt positioning. Attention was then turned back towards the notch.      The remaining ACL fibers were debrided and the intercondylar notch was prepared for ACL  reconstruction. A limited notchplasty was performed to open up the A-frame notch. Using an outside-in technique, creation of the femoral tunnel was completed with the Arthrex flip cutter guide set at 105 degrees. Proper tunnel position was referenced from the back wall and the distal articular cartilage margin. A 10 mm tunnel was created with the Flipcutter to a depth of 35 mm. The tibial tunnel was prepared with proper positioning referenced off the native fibers, PCL and the posterior margin of the anterior horn lateral meniscus. A low profile 10 mm reamer was used. No tunnel blowout was confirmed for both. The graft was then passed in standard fashion, retrograde. Following passage, an 8 x 20 mm metal interference screw was placed for femoral fixation. After cycling the knee, it was brought into slight flexion and a 9 x 20 mm metal interference screw was placed for excellent fixation.  A Swivelock anchor was placed on the tibial side for backup fixation. The tendon length was measured at 56 mm, which was notably long. The tibial guide was set at just over 70 degrees to maximize tunnel length and to minimize graft tunnel mismatch. A small mismatch nonetheless was seen. The end of the tibial plug was burred down to ensure no bony prominence. Sutures from the tibial plug were incorporated for the backup fixation primary due to the mismatch.     The scope was then reintroduced and excellent placement and fixation of the ACL graft was confirmed to include no notch impingement with full extension. This concluded the procedure.     All wounds were thoroughly irrigated. Previously trimmed graft bone with allograft cortical fibers were placed in the BPTB harvest sites. Overlying retinaculum and paretenon tissue was meticulously repaired and the anterior incision was closed in layers 3-0 Monocryl in subcuticular fashion for skin. Local anesthetic as injected into the lateral thigh incision for additional anesthesia.       Soft muscle compartments and a 2+ DP pulse was confirmed at the conclusion of the case. Sterile dressings were applied. A long leg hinged knee brace was placed, set from 10 degrees of hyperextension to 90 degrees and locked in full extension at the conclusion. A [facility] cold therapy unit was also applied.     The patient was extubated and taken to the PACU in stable condition.     POSTOPERATIVE PLAN: Patient will weight bear as tolerates with the brace locked in extension. Range of motion may be full. Plan to follow an ACL autograft rehab protocol. Initial goals include maintenance of full knee extension, regaining flexion, swelling control, and quadriceps activation exercises. May discontinue the brace was the quad is firing well. Expected release for sports no earlier than 8-9 months following Sport Cord testing. DVT prophylaxis with ASA 81 mg twice daily x 2 weeks.

## 2019-11-19 NOTE — PLAN OF CARE
[facility]  Physical Therapy Initial Evaluation    Name: Jovanni Kerr  Clinic Number: [id number]    Therapy Diagnosis:   Encounter Diagnosis   Name Primary?    Acute pain of right knee      Physician: Martín Zendejas, *    Physician Orders: PT Eval and Treat   Medical Diagnosis from Referral: S83.511A (ICD-10-CM) - Rupture of anterior cruciate ligament of right knee, initial encounter  Evaluation Date: 11/19/2019  Authorization Period Expiration: 11/17/2020  Plan of Care Expiration: 5/5/2020  Visit # / Visits authorized: 1/ 1    Time In: 1410  Time Out: 1500  Total Billable Time: 50 minutes    Precautions: Standard, Patient will weight bear as tolerates with the brace locked in extension. Range of motion may be full. Plan to follow an ACL autograft rehab protocol. Initial goals include maintenance of full knee extension, regaining flexion, swelling control, and quadriceps activation exercises. May discontinue the brace was the quad is firing well. Expected release for sports no earlier than 8-9 months following Sport Cord testing.      PROCEDURES PERFORMED on 11/18/19:   Right knee arthroscopic bone patellar tendon bone autograft ACL reconstruction (CPT 63001)  Right knee arthroscopic lateral plica band resection (CPT 19282)  Right knee arthroscopic partial lateral release (CPT 60288)    Subjective   Date of onset: Spring 2019 but sx on 11/18/19  History of current condition - Jovanni reports: that he injured R knee playing frisbee a few months ago (March or April 2019).  States that he didn't get his knee diagnosed until last week.  States that his pain and swelling got better after injury but his knee was very unstable and unable to do anything active.         No past medical history on file.  Jovanni Kerr  has a past surgical history that includes Knee arthroscopy w/ ACL reconstruction (Right, 11/18/2019).    Jovanni has a current medication list which includes the  following prescription(s): aspirin, ondansetron, and oxycodone-acetaminophen.    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     Pain:  Current 2/10, worst 4/10, best 2/10   Location: right knee  Description: discomfort, numbness   Aggravating Factors: moving knee  Easing Factors: pain medication, ice, rest and elevation    Pts goals: To be able to return to recreational activities - running, biking, swimming    Objective     Observation: Pt is healthy and in no acute distress.  Post-op dressing/brace in place.    Gait: Pt ambulates with B axillary crutches, WBAT, with knee hinged brace locked in 0 degrees extension.     Functional tests:               SL Squat: Not performed 2/2 post-op.              DL Squat: Not performed 2/2 post-op.              Step-down: Not performed 2/2 post-op.              SLS EO: Not performed 2/2 post-op.              SLS EC: Not performed 2/2 post-op.     Range of Motion:   Knee Right Passive Left Passive   Flexion 70 137   Extension 5 lacking 5 hyper      Lower Extremity Strength:  Formal MMT not performed 2/2 POD#1 and increased pain.  Fair quad activation noted R LE.     Special Tests:  Not performed 2/2 post-op.     Joint Mobility: patellar hypomobility as expected post-op.     Palpation: unable to assess secondary to post op bandages and nerve block     Sensation:  Intact; diminished 2/2 residual nerve block.     Flexibility:  Grossly hypomobile quad, hamstring and gastroc as expected on post-op R LE     Edema: mod as expected post-op      CMS Impairment/Limitation/Restriction for FOTO Knee Survey    Therapist  "reviewed FOTO scores for Jovanni Kerr on 11/19/2019.   FOTO documents entered into EPIC - see Media section.    Limitation Score: 74%         TREATMENT   Treatment Time In: 1430  Treatment Time Out: 1445  Total Treatment time separate from Evaluation: 15 minutes    Jovanni received therapeutic exercises to develop strength, endurance, ROM and flexibility for 15 minutes including:  Knee extension stretch x 2 min  Quad sets (1/2 FR under knee) 10 x 5"   Heel slides 10 x 5"   HSS/HCS w/ strap x 30"     Jovanni received cold pack for 10 minutes to R knee.    Home Exercises and Patient Education Provided    Education provided re: POC, goals for therapy, role of therapy for care, exercises/HEP    Written Home Exercises Provided: yes.  Exercises were reviewed and Jovanni was able to demonstrate them prior to the end of the session.   Pt received a written copy of exercises to perform at home. Jovanni demonstrated good  understanding of the education provided.     See EMR under media for exercises given.   Assessment   Jovanni is a 34 y.o. male referred to outpatient Physical Therapy s/p R ACL reconstruction . Pt presents with decreased ROM, decreased core/LE strength, fair quad activation, and impaired balance/gait.  Pt will benefit from physical therapy, specifically stretching, joint mobs, core/LE strengthening, and balance/gait training, in order to reduce his c/o pain, improve impairments and functional limitations.      Pt prognosis is Good.   Pt will benefit from skilled outpatient Physical Therapy to address the deficits stated above and in the chart below, provide pt/family education, and to maximize pt's level of independence.     Plan of care discussed with patient: Yes  Pt's spiritual, cultural and educational needs considered and patient is agreeable to the plan of care and goals as stated below:     Anticipated Barriers for therapy: none    Medical Necessity is demonstrated by the " following  History  Co-morbidities and personal factors that may impact the plan of care Co-morbidities:   none    Personal Factors:   no deficits     low   Examination  Body Structures and Functions, activity limitations and participation restrictions that may impact the plan of care Body Regions:   R LE    Body Systems:    gross symmetry  ROM  strength  gross coordinated movement  balance  gait  transfers  transitions  motor control  motor learning    Participation Restrictions:   Walking, running, recreational activities, stairs, work    Activity limitations:   Learning and applying knowledge  no deficits    General Tasks and Commands  no deficits    Communication  no deficits    Mobility  walking  driving (bike, car, motorcycle)    Self care  washing oneself (bathing, drying, washing hands)  toileting  dressing    Domestic Life  shopping  cooking  doing house work (cleaning house, washing dishes, laundry)    Interactions/Relationships  no deficits    Life Areas  no deficits    Community and Social Life  community life  recreation and leisure         high   Clinical Presentation stable and uncomplicated low   Decision Making/ Complexity Score: low     GOALS: Short Term Goals:  12 weeks  1.Report decreased R knee pain  < / =  2/10  to increase tolerance for walking/stairs.  2. Increase knee ROM to 5 hyperextension to 135 degrees of knee flexion in order to be able to perform ADLs without difficulty.  3. Increase strength to 4-/5 MMT grade in R LE  to increase tolerance for ADL and work activities.  4. Pt to tolerate HEP to improve ROM and independence with ADL's    Long Term Goals: 24 weeks  1.Report decreased R knee pain < / = 0/10  to increase tolerance for running, biking and swimming.  2.Patient goal: To be able to return to recreational activities - running, biking, swimming  3.Increase strength to >/= 4+/5 in R LE  to increase tolerance for ADL and work activities.  4. Pt will be able to perform SL squat and  lateral bounding without difficulty or compensation in order to return to running.      Plan   Plan of care Certification: 11/19/2019 to 5/5/2020.    Outpatient Physical Therapy 2 times weekly for 24 weeks to include the following interventions: Electrical Stimulation IFC/Russian, Gait Training, Manual Therapy, Moist Heat/ Ice, Neuromuscular Re-ed, Patient Education, Therapeutic Activites and Therapeutic Exercise.     Kiera Lugo, PT, DPT, OCS

## 2019-11-19 NOTE — BRIEF OP NOTE
"[facility]  Brief Operative Note     SUMMARY     Surgery Date: 11/18/2019     Surgeon(s) and Role:     * JOSEF Álvarez MD - Primary    Assisting Surgeon: None    Pre-op Diagnosis:  Rupture of anterior cruciate ligament of right knee, initial encounter [S83.511A]    Post-op Diagnosis:  Post-Op Diagnosis Codes:     * Rupture of anterior cruciate ligament of right knee, initial encounter [S83.511A]    Procedure(s) (LRB):  RECONSTRUCTION, KNEE, ACL, ARTHROSCOPIC (Right)    Anesthesia: General    Description of the findings of the procedure: see above    Findings/Key Components: see above    Estimated Blood Loss: * No values recorded between 11/18/2019  3:42 PM and 11/18/2019  6:09 PM *         Specimens:   Specimen (12h ago, onward)    None          Discharge Note    SUMMARY     Admit Date: 11/18/2019    Discharge Date and Time:  11/18/2019 6:15 PM    Hospital Course (synopsis of major diagnoses, care, treatment, and services provided during the course of the hospital stay): see above     Final Diagnosis: Post-Op Diagnosis Codes:     * Rupture of anterior cruciate ligament of right knee, initial encounter [S83.511A]    Disposition: Home or Self Care    Follow Up/Patient Instructions:     Medications:  Reconciled Home Medications:      Medication List      START taking these medications    aspirin 81 MG EC tablet  Commonly known as:  ECOTRIN  Take 1 tablet (81 mg total) by mouth 2 (two) times daily. for 14 days     ondansetron 4 MG tablet  Commonly known as:  ZOFRAN  Take 1 tablet (4 mg total) by mouth every 8 (eight) hours as needed for Nausea.     oxyCODONE-acetaminophen 5-325 mg per tablet  Commonly known as:  PERCOCET  Take 1 tablet by mouth every 4 to 6 hours as needed.          Discharge Procedure Orders   CRUTCHES FOR HOME USE     Order Specific Question Answer Comments   Type: Axillary    Height: 5' 10" (1.778 m)    Weight: 79.4 kg (175 lb)    Length of need (1-99 months): 99  " "    CRUTCHES FOR HOME USE   Order Comments: Provide if needed     Order Specific Question Answer Comments   Type: Axillary    Height: 5' 10" (1.778 m)    Weight: 79.4 kg (175 lb)    Does patient have medical equipment at home? none    Length of need (1-99 months): 24      Referral to Physical therapy   Referral Priority: Routine Referral Type: Physical Medicine   Referral Reason: Specialty Services Required   Requested Specialty: Physical Therapy   Number of Visits Requested: 1     Diet general     Call MD for:  temperature >100.4     Call MD for:  persistent nausea and vomiting     Call MD for:  severe uncontrolled pain     Call MD for:  difficulty breathing, headache or visual disturbances     Call MD for:  redness, tenderness, or signs of infection (pain, swelling, redness, odor or green/yellow discharge around incision site)     Call MD for:  hives     Call MD for:  persistent dizziness or light-headedness     Remove dressing in 72 hours       "

## 2019-11-19 NOTE — PROGRESS NOTES
Pt spoke with LUCHO Mehta  No pain;  LE very numb; pt has turned it off. I instructed patient to turn back on to prevent rebound pain as once primary block wears off, it will be difficult to re establish block. The initial block was done at 1430 with bupiv so he does have some time until it wears off. While on the phone, pt turned OnQ back on.   He has taken one pain pill so far; no pain   Pt also states unable to dorsiflex; will follow this  Pt understands and will contact Dr Gamble the APS for any questions or issues

## 2019-11-19 NOTE — PLAN OF CARE
Pt brought their ordered DME in from home and it is at the bedside. Verbal and written instructions provided to pt and competent caregiver on proper usage. Also educated on the risk of falling if DME is not used/not used properly. All parties verbalized understanding.Discharged to home with family. Pain controlled. Denies nausea. AVS reviewed and copy given. Consents in chart

## 2019-11-20 VITALS
RESPIRATION RATE: 19 BRPM | TEMPERATURE: 98 F | SYSTOLIC BLOOD PRESSURE: 116 MMHG | OXYGEN SATURATION: 95 % | DIASTOLIC BLOOD PRESSURE: 74 MMHG | WEIGHT: 175 LBS | HEIGHT: 70 IN | HEART RATE: 68 BPM | BODY MASS INDEX: 25.05 KG/M2

## 2019-11-20 NOTE — PROGRESS NOTES
11/20/2019 1320    Called and spoke with patient regarding On-Q follow up. Stated patient's catheter remains intact with medication pump infusing- dressing clean, dry, and intact without any leakage or drainage. Reports patient's pain well controlled. Encouraged to call with any questions or concerns.

## 2019-11-21 ENCOUNTER — CLINICAL SUPPORT (OUTPATIENT)
Dept: REHABILITATION | Facility: HOSPITAL | Age: 34
End: 2019-11-21
Payer: COMMERCIAL

## 2019-11-21 DIAGNOSIS — M25.561 ACUTE PAIN OF RIGHT KNEE: ICD-10-CM

## 2019-11-21 PROCEDURE — 97110 THERAPEUTIC EXERCISES: CPT

## 2019-11-21 PROCEDURE — 97112 NEUROMUSCULAR REEDUCATION: CPT

## 2019-11-21 NOTE — ANESTHESIA POSTPROCEDURE EVALUATION
Anesthesia Post Evaluation    Patient: Jovanni Kerr    Procedure(s) Performed: Procedure(s) (LRB):  RECONSTRUCTION, KNEE, ACL, ARTHROSCOPIC (Right)    Final Anesthesia Type: general    Patient location during evaluation: PACU  Patient participation: Yes- Able to Participate  Level of consciousness: awake and alert and oriented  Post-procedure vital signs: reviewed and stable  Pain management: adequate  Airway patency: patent    PONV status at discharge: No PONV  Anesthetic complications: no      Cardiovascular status: hemodynamically stable  Respiratory status: unassisted, spontaneous ventilation and room air  Hydration status: euvolemic  Follow-up not needed.          Vitals Value Taken Time   /74 11/18/2019  8:02 PM   Temp 36.9 °C (98.4 °F) 11/18/2019  6:07 PM   Pulse 67 11/18/2019  8:09 PM   Resp 13 11/18/2019  8:06 PM   SpO2 98 % 11/18/2019  8:09 PM   Vitals shown include unvalidated device data.      Event Time     Out of Recovery 11/18/2019 19:42:06          Pain/Dorota Score: No data recorded

## 2019-11-21 NOTE — PROGRESS NOTES
"  Physical Therapy Daily Treatment Note     Name: Jovanni Kerr  Clinic Number: [id number]    Therapy Diagnosis:   Encounter Diagnosis   Name Primary?    Acute pain of right knee      Physician: Martín Zendejas, *    Visit Date: 11/21/2019  Physician Orders: PT Eval and Treat   Medical Diagnosis from Referral: S83.511A (ICD-10-CM) - Rupture of anterior cruciate ligament of right knee, initial encounter  Evaluation Date: 11/19/2019  Authorization Period Expiration: 11/17/2020  Plan of Care Expiration: 5/5/2020  Visit # / Visits authorized: 1/ 1     Time In: 1010  Time Out: 1120  Total Billable Time: 50 minutes     Precautions: Standard, Patient will weight bear as tolerates with the brace locked in extension. Range of motion may be full. Plan to follow an ACL autograft rehab protocol. Initial goals include maintenance of full knee extension, regaining flexion, swelling control, and quadriceps activation exercises. May discontinue the brace was the quad is firing well. Expected release for sports no earlier than 8-9 months following Sport Cord testing.        PROCEDURES PERFORMED on 11/18/19:   Right knee arthroscopic bone patellar tendon bone autograft ACL reconstruction (CPT 88438)  Right knee arthroscopic lateral plica band resection (CPT 62881)  Right knee arthroscopic partial lateral release (CPT 49741)    Subjective     Pt reports: min c/o pain.  He was compliant with home exercise program.  Response to previous treatment: no adverse effects  Functional change: Pt ambulating with one axillary crutch    Pain: 3/10  Location: right knee     Objective     Jovanni received therapeutic exercises to develop strength, endurance, ROM and flexibility for 45 minutes including:  Knee extension stretch x 4 min  SLR 3 x 10 (no brace)  SL hip abd 2 x 10 (increased muscle cramping noted)  Prone hip extension 3 x 10  Heel slides 20 x 5"   HSS/HCS w/ strap x 30"     Jovanni participated in neuromuscular re-education " activities to improve: motor control for 10 minutes. The following activities were included:  Japanese stim w/ quad set (1/2 FR under knee) 10 sec on/ 10 sec off x 10 min      Jovanni received the following manual therapy techniques: Joint mobilizations were applied to the: R knee for 5 minutes, including:  Patellar mobs    Jovanni received cold pack for 10 minutes to to decrease circulation, pain, and swelling.      Home Exercises Provided and Patient Education Provided     Education provided:   - proper     Written Home Exercises Provided: Patient instructed to cont prior HEP.  Exercises were reviewed and Jovanni was able to demonstrate them prior to the end of the session.  Jovanni demonstrated good  understanding of the education provided.     See EMR under Media for exercises provided at .    Assessment     Pt tolerated treatment well today.  Pt able to reach 1 degrees of hyperextension and able to tolerate knee extension better.  Achieved 93 degrees of knee flexion as well today.  Initiated Russian stim today to further improve quad activation.  Pt able to perform SLR with slight to no extensor lag.  Most difficulty perform SL hip abduction secondary to muscle cramping in gluts.  Progress as tolerated.      Jovanni is progressing well towards his goals.   Pt prognosis is Good.     Pt will continue to benefit from skilled outpatient physical therapy to address the deficits listed in the problem list box on initial evaluation, provide pt/family education and to maximize pt's level of independence in the home and community environment.     Pt's spiritual, cultural and educational needs considered and pt agreeable to plan of care and goals.    Anticipated barriers to physical therapy: work schedule    GOALS: Short Term Goals:  12 weeks  1.Report decreased R knee pain  < / =  2/10  to increase tolerance for walking/stairs Progressing, not met  2. Increase knee ROM to 5 hyperextension to 135 degrees of knee flexion  in order to be able to perform ADLs without difficulty. Progressing, not met  3. Increase strength to 4-/5 MMT grade in R LE  to increase tolerance for ADL and work activities. Progressing, not met  4. Pt to tolerate HEP to improve ROM and independence with ADL's Progressing, not met     Long Term Goals: 24 weeks  1.Report decreased R knee pain < / = 0/10  to increase tolerance for running, biking and swimming. Progressing, not met  2.Patient goal: To be able to return to recreational activities - running, biking, swimming. Progressing, not met  3.Increase strength to >/= 4+/5 in R LE  to increase tolerance for ADL and work activities. Progressing, not met  4. Pt will be able to perform SL squat and lateral bounding without difficulty or compensation in order to return to running. Progressing, not met    Plan     Continue with established Plan of Care towards PT goals, per ACL BTB autograft protocol (Dr. Álvarez).    Kiera Lugo, PT, DPT, OCS

## 2019-11-21 NOTE — PROGRESS NOTES
11/21/2019 1100    Attempted to call patient regarding OnQ follow-up, no answer received. OnQ was due to be completed last night. Will attempt to contact patient later this afternoon to ensure OnQ removed without difficulty.

## 2019-11-21 NOTE — PROGRESS NOTES
11/21/2019 1324    Called and spoke with patient. On-Q pump removed this morning at 0800 when medication completed. Stated that he did not check to see if blue tip was intact to end of catheter at the time of removal, but will check and call back in the event it is not intact. Reports that top of knee remains numb- reassured that medication typically takes 8-12 hours to wear off, sometimes longer. Instructed to call for any concern of numbness beyond this timeframe. Verbalizes understanding.

## 2019-11-25 ENCOUNTER — CLINICAL SUPPORT (OUTPATIENT)
Dept: REHABILITATION | Facility: HOSPITAL | Age: 34
End: 2019-11-25
Payer: COMMERCIAL

## 2019-11-25 DIAGNOSIS — M25.561 ACUTE PAIN OF RIGHT KNEE: ICD-10-CM

## 2019-11-25 PROCEDURE — 97110 THERAPEUTIC EXERCISES: CPT

## 2019-11-25 PROCEDURE — 97112 NEUROMUSCULAR REEDUCATION: CPT

## 2019-11-25 NOTE — PROGRESS NOTES
"  Physical Therapy Daily Treatment Note     Name: Jovanni Kerr  Clinic Number: [id number]    Therapy Diagnosis:   Encounter Diagnosis   Name Primary?    Acute pain of right knee      Physician: Martín Zendejas, *    Visit Date: 11/25/2019  Physician Orders: PT Eval and Treat   Medical Diagnosis from Referral: S83.511A (ICD-10-CM) - Rupture of anterior cruciate ligament of right knee, initial encounter  Evaluation Date: 11/19/2019  Authorization Period Expiration: 12/31/19  Plan of Care Expiration: 5/5/2020  Visit # / Visits authorized: 1/ 18  Visit # total: 2     Time In: 1515  Time Out: 1430  Total Billable Time: 50 minutes     Precautions: Standard, Patient will weight bear as tolerates with the brace locked in extension. Range of motion may be full. Plan to follow an ACL autograft rehab protocol. Initial goals include maintenance of full knee extension, regaining flexion, swelling control, and quadriceps activation exercises. May discontinue the brace was the quad is firing well. Expected release for sports no earlier than 8-9 months following Sport Cord testing.        PROCEDURES PERFORMED on 11/18/19:   Right knee arthroscopic bone patellar tendon bone autograft ACL reconstruction (CPT 26674)  Right knee arthroscopic lateral plica band resection (CPT 67340)  Right knee arthroscopic partial lateral release (CPT 79741)    Subjective     Pt reports: min c/o pain.  He was compliant with home exercise program.  Response to previous treatment: no adverse effects  Functional change: Pt ambulating with one axillary crutch    Pain: 3/10  Location: right knee     Objective     Jovanni received therapeutic exercises to develop strength, endurance, ROM and flexibility for 45 minutes including:  Knee extension stretch 4# thigh/ 4# shin x 4 min  SLR 3 x 10 (no brace)  SL hip abd 3 x 10 (no brace)  Prone hip extension 3 x 10 (no brace)  Heel slides 30 x 5"   HSS/HCS w/ strap x 30"       Jovanni participated in " neuromuscular re-education activities to improve: motor control for 10 minutes. The following activities were included:  Eritrean stim w/ quad set (1/2 FR under knee) 10 sec on/ 10 sec off x 10 min      Jovanni received the following manual therapy techniques: Joint mobilizations were applied to the: R knee for 5 minutes, including:  Patellar mobs    Jovanni received cold pack for 10 minutes to to decrease circulation, pain, and swelling.      Home Exercises Provided and Patient Education Provided     Education provided:   - proper     Written Home Exercises Provided: Patient instructed to cont prior HEP.  Exercises were reviewed and Jovanni was able to demonstrate them prior to the end of the session.  Jovanni demonstrated good  understanding of the education provided.     See EMR under Media for exercises provided at .    Assessment     Pt tolerated treatment well today.  Pt able to achieve full knee extension after stretches.  Also, achieved 100 degrees of knee flexion as well today.  Quad activation continues to be fair.  Improvements noted after Russian stim.  Decreased glut cramping noted today with patient being able to complete all 3 sets of SL hip abd.  Progress as tolerated.      Jovanni is progressing well towards his goals.   Pt prognosis is Good.     Pt will continue to benefit from skilled outpatient physical therapy to address the deficits listed in the problem list box on initial evaluation, provide pt/family education and to maximize pt's level of independence in the home and community environment.     Pt's spiritual, cultural and educational needs considered and pt agreeable to plan of care and goals.    Anticipated barriers to physical therapy: work schedule    GOALS: Short Term Goals:  12 weeks  1.Report decreased R knee pain  < / =  2/10  to increase tolerance for walking/stairs Progressing, not met  2. Increase knee ROM to 5 hyperextension to 135 degrees of knee flexion in order to be able to  perform ADLs without difficulty. Progressing, not met  3. Increase strength to 4-/5 MMT grade in R LE  to increase tolerance for ADL and work activities. Progressing, not met  4. Pt to tolerate HEP to improve ROM and independence with ADL's Progressing, not met     Long Term Goals: 24 weeks  1.Report decreased R knee pain < / = 0/10  to increase tolerance for running, biking and swimming. Progressing, not met  2.Patient goal: To be able to return to recreational activities - running, biking, swimming. Progressing, not met  3.Increase strength to >/= 4+/5 in R LE  to increase tolerance for ADL and work activities. Progressing, not met  4. Pt will be able to perform SL squat and lateral bounding without difficulty or compensation in order to return to running. Progressing, not met    Plan     Continue with established Plan of Care towards PT goals, per ACL BTB autograft protocol (Dr. Álvarez).    Kiera Lugo, PT, DPT, OCS

## 2019-12-02 NOTE — PROGRESS NOTES
S:Jovanni Kerr presents for post-operative evaluation.     DATE OF PROCEDURE: 11/18/2019   PROCEDURES PERFORMED:   Right knee arthroscopic bone patellar tendon bone autograft ACL reconstruction  Right knee arthroscopic lateral plica band resection   Right knee arthroscopic partial lateral release     Jovanni Kerr reports to be doing well 2 wk s/p the above mentioned procedure. Presents today FWB with the t-scope brace locked in extension. Denies fevers, chills, night sweats, chest pain, difficulty breathing, calf pain or tenderness. Going to PT 1-2 x Week at the [place] location. Reports some stiffness but is seeing continued progress. Pain levels are improving. Has d/c'd pain medication.  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    O: RLE - The incisions are healing well. No signs of infection. Suture ends were removed. No significant pain or unusual tenderness. 1+ effusion. Full passive extension. Flexion to 80 degrees with some pain, flexion to 90 with ease post aspiration. Negative Lachman. NVI distally. Quad activates well with good control.    Radiographs: S/p ACLR. Tunnels in an acceptable position. Noted bone plug positioning and small femoral screw divergence from the plug.    A/P: Arthroscopic pictures were reviewed with the patient. Plan to follow the rehab plan as previously outlined - follow an ACL autograft rehab protocol. D/c the t-scope brace now. 3D knee sleeve dispensed. Discussed importance of ROM exercises at home & patellar mobilizations.  Also work on quad activation.  Goal of full ROM by 6 weeks. Expected release for sports no earlier than 8-9 months following Sport Cord testing. RTC in 4 weeks.  All questions answered.

## 2019-12-03 ENCOUNTER — HOSPITAL ENCOUNTER (OUTPATIENT)
Dept: RADIOLOGY | Facility: HOSPITAL | Age: 34
Discharge: HOME | End: 2019-12-03
Attending: ORTHOPAEDIC SURGERY
Payer: COMMERCIAL

## 2019-12-03 ENCOUNTER — OFFICE VISIT (OUTPATIENT)
Dept: SPORTS MEDICINE | Facility: CLINIC | Age: 34
End: 2019-12-03
Payer: COMMERCIAL

## 2019-12-03 VITALS
HEIGHT: 70 IN | HEART RATE: 63 BPM | SYSTOLIC BLOOD PRESSURE: 135 MMHG | WEIGHT: 175 LBS | BODY MASS INDEX: 25.05 KG/M2 | DIASTOLIC BLOOD PRESSURE: 89 MMHG

## 2019-12-03 DIAGNOSIS — Z98.890 S/P ACL RECONSTRUCTION: Primary | ICD-10-CM

## 2019-12-03 DIAGNOSIS — M25.561 RIGHT KNEE PAIN, UNSPECIFIED CHRONICITY: ICD-10-CM

## 2019-12-03 DIAGNOSIS — Z98.890 S/P ACL RECONSTRUCTION: ICD-10-CM

## 2019-12-03 PROCEDURE — 73560 X-RAY EXAM OF KNEE 1 OR 2: CPT | Mod: 26,RT,, | Performed by: RADIOLOGY

## 2019-12-03 PROCEDURE — 73560 XR KNEE 1 OR 2 VIEW RIGHT: ICD-10-PCS | Mod: 26,RT,, | Performed by: RADIOLOGY

## 2019-12-03 PROCEDURE — 99024 PR POST-OP FOLLOW-UP VISIT: ICD-10-PCS | Mod: S$GLB,,, | Performed by: ORTHOPAEDIC SURGERY

## 2019-12-03 PROCEDURE — 20610 DRAIN/INJ JOINT/BURSA W/O US: CPT | Mod: 58,RT,S$GLB, | Performed by: ORTHOPAEDIC SURGERY

## 2019-12-03 PROCEDURE — 99999 PR PBB SHADOW E&M-EST. PATIENT-LVL III: ICD-10-PCS | Mod: PBBFAC,,, | Performed by: ORTHOPAEDIC SURGERY

## 2019-12-03 PROCEDURE — 20610 LARGE JOINT ASPIRATION/INJECTION: R KNEE: ICD-10-PCS | Mod: 58,RT,S$GLB, | Performed by: ORTHOPAEDIC SURGERY

## 2019-12-03 PROCEDURE — 99999 PR PBB SHADOW E&M-EST. PATIENT-LVL III: CPT | Mod: PBBFAC,,, | Performed by: ORTHOPAEDIC SURGERY

## 2019-12-03 PROCEDURE — 99024 POSTOP FOLLOW-UP VISIT: CPT | Mod: S$GLB,,, | Performed by: ORTHOPAEDIC SURGERY

## 2019-12-03 PROCEDURE — 73560 X-RAY EXAM OF KNEE 1 OR 2: CPT | Mod: TC,RT

## 2019-12-03 RX ORDER — DOCUSATE SODIUM 100 MG/1
100 CAPSULE, LIQUID FILLED ORAL 2 TIMES DAILY
COMMUNITY

## 2019-12-04 NOTE — PROCEDURES
Large Joint Aspiration/Injection: R knee  Date/Time: 12/3/2019 3:45 PM  Performed by: JOSEF Álvarez MD  Authorized by: JOSEF Álvarez MD     Consent Done?:  Yes (Verbal)  Indications:  Joint swelling  Procedure site marked: Yes    Timeout: Prior to procedure the correct patient, procedure, and site was verified      Location:  Knee  Site:  R knee  Prep: Patient was prepped and draped in usual sterile fashion    Needle size:  22 G  Approach:  Superior  Aspirate amount (ml):  30  Aspirate:  Clear and serous  Patient tolerance:  Patient tolerated the procedure well with no immediate complications

## 2019-12-05 ENCOUNTER — CLINICAL SUPPORT (OUTPATIENT)
Dept: REHABILITATION | Facility: HOSPITAL | Age: 34
End: 2019-12-05
Payer: COMMERCIAL

## 2019-12-05 DIAGNOSIS — M25.561 ACUTE PAIN OF RIGHT KNEE: ICD-10-CM

## 2019-12-05 PROCEDURE — 97140 MANUAL THERAPY 1/> REGIONS: CPT

## 2019-12-05 PROCEDURE — 97112 NEUROMUSCULAR REEDUCATION: CPT

## 2019-12-05 PROCEDURE — 97110 THERAPEUTIC EXERCISES: CPT

## 2019-12-05 PROCEDURE — 97116 GAIT TRAINING THERAPY: CPT

## 2019-12-05 NOTE — PROGRESS NOTES
Physical Therapy Daily Treatment Note     Name: Jovanni Kerr  Clinic Number: [id number]    Therapy Diagnosis:   Encounter Diagnosis   Name Primary?    Acute pain of right knee      Physician: Martín Zendejas, *    Visit Date: 12/5/2019  Physician Orders: PT Eval and Treat   Medical Diagnosis from Referral: S83.511A (ICD-10-CM) - Rupture of anterior cruciate ligament of right knee, initial encounter  Evaluation Date: 11/19/2019  Authorization Period Expiration: 12/31/19  Plan of Care Expiration: 5/5/2020  Visit # / Visits authorized: 2/ 18  Visit # total: 3     Time In: 1600  Time Out: 1515  Total Billable Time: 40 minutes     Precautions: Standard, Patient will weight bear as tolerates with the brace locked in extension. Range of motion may be full. Plan to follow an ACL autograft rehab protocol. Initial goals include maintenance of full knee extension, regaining flexion, swelling control, and quadriceps activation exercises. May discontinue the brace was the quad is firing well. Expected release for sports no earlier than 8-9 months following Sport Cord testing.        PROCEDURES PERFORMED on 11/18/19:   Right knee arthroscopic bone patellar tendon bone autograft ACL reconstruction (CPT 25099)  Right knee arthroscopic lateral plica band resection (CPT 53585)  Right knee arthroscopic partial lateral release (CPT 80659)    Subjective     Pt reports: increased pain today.  Is now walking without brace and without crutches.  He was compliant with home exercise program.  Response to previous treatment: no adverse effects  Functional change: Pt ambulating without crutches and without brace.    Pain: 3/10  Location: right knee     Objective     Jovanni received therapeutic exercises to develop strength, endurance, ROM and flexibility for 40 minutes including:  Knee extension stretch 4# thigh/ 4# shin x 5 min  SLR 3 x 10 (no brace) NP  SL hip abd 3 x 10 (no brace) NP  Prone hip extension 3 x 10 (no brace)  "NP  Heel slides 30 x 5"   Passive knee flexion off EOM 20 x 5"   Bike bkwd only (seat height 6) x 5 min  HSS/HCS w/ strap 3 x 30"     Jovanni participated in gait training to improve functional mobility and safety for 10  minutes, including:  Gait training x multiple laps      Jovanni participated in neuromuscular re-education activities to improve: motor control for 10 minutes. The following activities were included:  Russian stim w/ SAQ 10 sec on/ 10 sec off x 4 min  Russian stim w/ prone quad set 10 sec on/ 10 sec off x 6 min    Jovanni received the following manual therapy techniques: Joint mobilizations were applied to the: R knee for 10 minutes, including:  Patellar mobs    Jovanni received cold pack for 10 minutes to to decrease circulation, pain, and swelling.      Home Exercises Provided and Patient Education Provided     Education provided:   - proper     Written Home Exercises Provided: Patient instructed to cont prior HEP.  Exercises were reviewed and Jovanni was able to demonstrate them prior to the end of the session.  Jovanni demonstrated good  understanding of the education provided.     See EMR under Media for exercises provided at .    Assessment     Pt tolerated treatment fair today.  Worked mostly on ROM, quad activation and gait today.  Pt with increased stiffness and difficulty achieving full knee extension and 100 degrees of knee flexion.  Had to perform quad sets in prone secondary to increased anterior knee pain during SAQs.  Instructed pt in gait with proper knee flexion during swing phase.  Progress as tolerated.      Jovanni is progressing well towards his goals.   Pt prognosis is Good.     Pt will continue to benefit from skilled outpatient physical therapy to address the deficits listed in the problem list box on initial evaluation, provide pt/family education and to maximize pt's level of independence in the home and community environment.     Pt's spiritual, cultural and educational " needs considered and pt agreeable to plan of care and goals.    Anticipated barriers to physical therapy: work schedule    GOALS: Short Term Goals:  12 weeks  1.Report decreased R knee pain  < / =  2/10  to increase tolerance for walking/stairs Progressing, not met  2. Increase knee ROM to 5 hyperextension to 135 degrees of knee flexion in order to be able to perform ADLs without difficulty. Progressing, not met  3. Increase strength to 4-/5 MMT grade in R LE  to increase tolerance for ADL and work activities. Progressing, not met  4. Pt to tolerate HEP to improve ROM and independence with ADL's Progressing, not met     Long Term Goals: 24 weeks  1.Report decreased R knee pain < / = 0/10  to increase tolerance for running, biking and swimming. Progressing, not met  2.Patient goal: To be able to return to recreational activities - running, biking, swimming. Progressing, not met  3.Increase strength to >/= 4+/5 in R LE  to increase tolerance for ADL and work activities. Progressing, not met  4. Pt will be able to perform SL squat and lateral bounding without difficulty or compensation in order to return to running. Progressing, not met    Plan     Continue with established Plan of Care towards PT goals, per ACL BTB autograft protocol (Dr. Álvarez).    Kiera Lugo, PT, DPT, OCS

## 2019-12-10 ENCOUNTER — CLINICAL SUPPORT (OUTPATIENT)
Dept: REHABILITATION | Facility: HOSPITAL | Age: 34
End: 2019-12-10
Payer: COMMERCIAL

## 2019-12-10 DIAGNOSIS — M25.561 ACUTE PAIN OF RIGHT KNEE: ICD-10-CM

## 2019-12-10 PROCEDURE — 97112 NEUROMUSCULAR REEDUCATION: CPT

## 2019-12-10 PROCEDURE — 97110 THERAPEUTIC EXERCISES: CPT

## 2019-12-10 PROCEDURE — 97140 MANUAL THERAPY 1/> REGIONS: CPT

## 2019-12-10 NOTE — PROGRESS NOTES
"  Physical Therapy Daily Treatment Note     Name: Jovanni Kerr  Clinic Number: [id number]    Therapy Diagnosis:   Encounter Diagnosis   Name Primary?    Acute pain of right knee      Physician: Martín Zendejas, *    Visit Date: 12/10/2019  Physician Orders: PT Eval and Treat   Medical Diagnosis from Referral: S83.511A (ICD-10-CM) - Rupture of anterior cruciate ligament of right knee, initial encounter  Evaluation Date: 11/19/2019  Authorization Period Expiration: 12/31/19  Plan of Care Expiration: 5/5/2020  Visit # / Visits authorized: 4/ 18  Visit # total: 5     Time In: 1710  Time Out: 1805  Total Billable Time: 35 minutes     Precautions: Standard, Patient will weight bear as tolerates with the brace locked in extension. Range of motion may be full. Plan to follow an ACL autograft rehab protocol. Initial goals include maintenance of full knee extension, regaining flexion, swelling control, and quadriceps activation exercises. May discontinue the brace was the quad is firing well. Expected release for sports no earlier than 8-9 months following Sport Cord testing.        PROCEDURES PERFORMED on 11/18/19:   Right knee arthroscopic bone patellar tendon bone autograft ACL reconstruction (CPT 47878)  Right knee arthroscopic lateral plica band resection (CPT 19400)  Right knee arthroscopic partial lateral release (CPT 78301)    Subjective     Pt reports: that he has good and bad days.  Today is a better day.  He was compliant with home exercise program.  Response to previous treatment: no adverse effects  Functional change: Pt ambulating with soft knee brace.    Pain: 3/10  Location: right knee     Objective     Jovanni received therapeutic exercises to develop strength, endurance, ROM and flexibility for 35 minutes including:  Knee extension stretch 4# thigh/ 4# shin x 5 min  SLR 3 x 10 (no brace) NP  SL hip abd 3 x 10 (no brace) NP  Prone hip extension 3 x 10 (no brace) NP  Heel slides 30 x 5"   Bike full " "revolutions fwd x 8 min  Mini squats 3 x 10  TKE w/ GTB 30 x 3"   HSS/HCS w/ strap 3 x 30"       Jovanni participated in gait training to improve functional mobility and safety for 0 minutes, including:  Gait training x multiple laps    Jovanni participated in neuromuscular re-education activities to improve: motor control for 10 minutes. The following activities were included:  Samoan stim w/ quad set (ankle propped) 10 sec on/ 10 sec off x 5 min  Russian stim w/ prone quad set 10 sec on/ 10 sec off x 5 min    Jovanni received the following manual therapy techniques: Joint mobilizations were applied to the: R knee for 10 minutes, including:  Patellar mobs  Cross friction over incisions        Home Exercises Provided and Patient Education Provided     Education provided:   - proper     Written Home Exercises Provided: Patient instructed to cont prior HEP.  Exercises were reviewed and Jovanni was able to demonstrate them prior to the end of the session.  Jovanni demonstrated good  understanding of the education provided.     See EMR under Media for exercises provided at .    Assessment     Pt tolerated treatment well today with improved ROM.  Pt with full knee extension and 110 degrees of knee flexion today.  Continued tightness along incision and fat pad.  Pt with increased difficulty performing quad set over 1/2 FR secondary to pain.  Able to perform quad set otherwise.  Initiated mini squats and TKEs today to further improve function. Progress as tolerated.      Jovanni is progressing well towards his goals.   Pt prognosis is Good.     Pt will continue to benefit from skilled outpatient physical therapy to address the deficits listed in the problem list box on initial evaluation, provide pt/family education and to maximize pt's level of independence in the home and community environment.     Pt's spiritual, cultural and educational needs considered and pt agreeable to plan of care and goals.    Anticipated " barriers to physical therapy: work schedule    GOALS: Short Term Goals:  12 weeks  1.Report decreased R knee pain  < / =  2/10  to increase tolerance for walking/stairs Progressing, not met  2. Increase knee ROM to 5 hyperextension to 135 degrees of knee flexion in order to be able to perform ADLs without difficulty. Progressing, not met  3. Increase strength to 4-/5 MMT grade in R LE  to increase tolerance for ADL and work activities. Progressing, not met  4. Pt to tolerate HEP to improve ROM and independence with ADL's Progressing, not met     Long Term Goals: 24 weeks  1.Report decreased R knee pain < / = 0/10  to increase tolerance for running, biking and swimming. Progressing, not met  2.Patient goal: To be able to return to recreational activities - running, biking, swimming. Progressing, not met  3.Increase strength to >/= 4+/5 in R LE  to increase tolerance for ADL and work activities. Progressing, not met  4. Pt will be able to perform SL squat and lateral bounding without difficulty or compensation in order to return to running. Progressing, not met    Plan     Continue with established Plan of Care towards PT goals, per ACL BTB autograft protocol (Dr. Álvarez).    Kiera Lugo, PT, DPT, OCS

## 2019-12-13 ENCOUNTER — CLINICAL SUPPORT (OUTPATIENT)
Dept: REHABILITATION | Facility: HOSPITAL | Age: 34
End: 2019-12-13
Payer: COMMERCIAL

## 2019-12-13 DIAGNOSIS — M25.561 ACUTE PAIN OF RIGHT KNEE: ICD-10-CM

## 2019-12-13 PROCEDURE — 97110 THERAPEUTIC EXERCISES: CPT

## 2019-12-13 PROCEDURE — 97112 NEUROMUSCULAR REEDUCATION: CPT

## 2019-12-13 PROCEDURE — 97140 MANUAL THERAPY 1/> REGIONS: CPT

## 2019-12-13 NOTE — PROGRESS NOTES
"  Physical Therapy Daily Treatment Note     Name: Jovanni Kerr  Clinic Number: [id number]    Therapy Diagnosis:   Encounter Diagnosis   Name Primary?    Acute pain of right knee      Physician: Martín Zendejas, *    Visit Date: 12/13/2019  Physician Orders: PT Eval and Treat   Medical Diagnosis from Referral: S83.511A (ICD-10-CM) - Rupture of anterior cruciate ligament of right knee, initial encounter  Evaluation Date: 11/19/2019  Authorization Period Expiration: 12/31/19  Plan of Care Expiration: 5/5/2020  Visit # / Visits authorized: 5/ 18  Visit # total: 6     Time In: 1615  Time Out: 1710  Total Billable Time: 55 minutes     Precautions: Standard, Patient will weight bear as tolerates with the brace locked in extension. Range of motion may be full. Plan to follow an ACL autograft rehab protocol. Initial goals include maintenance of full knee extension, regaining flexion, swelling control, and quadriceps activation exercises. May discontinue the brace was the quad is firing well. Expected release for sports no earlier than 8-9 months following Sport Cord testing.        PROCEDURES PERFORMED on 11/18/19:   Right knee arthroscopic bone patellar tendon bone autograft ACL reconstruction (CPT 04914)  Right knee arthroscopic lateral plica band resection (CPT 74885)  Right knee arthroscopic partial lateral release (CPT 42288)    Subjective     Pt reports: that he continues to have pain.  He was compliant with home exercise program.  Response to previous treatment: no adverse effects  Functional change: Pt ambulating with soft knee brace.    Pain: 3/10  Location: right knee     Objective     Jovanni received therapeutic exercises to develop strength, endurance, ROM and flexibility for 35 minutes including:  Knee extension stretch 4# thigh/ 4# shin x 4 min  SLR 3 x 10 (no brace) NP  SL hip abd 3 x 10 (no brace) NP  Prone hip extension 3 x 10 (no brace) NP  Heel slides 30 x 5"   Bike full revolutions fwd x 5 " "min  Shuttle eccentric lowering 25# 3 x 10 R  HSS/HCS w/ strap 3 x 30"       ---not performed today---  Mini squats 3 x 10  TKE w/ GTB 30 x 3"         Jovanni participated in gait training to improve functional mobility and safety for 0 minutes, including:  Gait training x multiple laps    Jovanni participated in neuromuscular re-education activities to improve: motor control for 10 minutes. The following activities were included:  Iraqi stim w/ quad set (ankle propped) 10 sec on/ 10 sec off x 5 min  Russian stim w/ prone quad set 10 sec on/ 10 sec off x 5 min    Jovanni received the following manual therapy techniques: Joint mobilizations were applied to the: R knee for 10 minutes, including:  Patellar mobs  Cross friction over incisions        Home Exercises Provided and Patient Education Provided     Education provided:   - proper     Written Home Exercises Provided: Patient instructed to cont prior HEP.  Exercises were reviewed and Jovanni was able to demonstrate them prior to the end of the session.  Jovanni demonstrated good  understanding of the education provided.     See EMR under Media for exercises provided at .    Assessment     Pt tolerated treatment well today with improved ROM.  Pt with full knee extension and 112 degrees of knee flexion today.  Continued tightness along incision and fat pad.  Pt continues to present with increased difficulty performing quad set over 1/2 FR secondary to pain.  Initiated eccentric shuttle today to further break up scar tissue adhesion and work on quad strength. Progress as tolerated.  Next visit, try superior gliding patella with quad set over bolster.      Jovanni is progressing well towards his goals.   Pt prognosis is Good.     Pt will continue to benefit from skilled outpatient physical therapy to address the deficits listed in the problem list box on initial evaluation, provide pt/family education and to maximize pt's level of independence in the home and " community environment.     Pt's spiritual, cultural and educational needs considered and pt agreeable to plan of care and goals.    Anticipated barriers to physical therapy: work schedule    GOALS: Short Term Goals:  12 weeks  1.Report decreased R knee pain  < / =  2/10  to increase tolerance for walking/stairs Progressing, not met  2. Increase knee ROM to 5 hyperextension to 135 degrees of knee flexion in order to be able to perform ADLs without difficulty. Progressing, not met  3. Increase strength to 4-/5 MMT grade in R LE  to increase tolerance for ADL and work activities. Progressing, not met  4. Pt to tolerate HEP to improve ROM and independence with ADL's Progressing, not met     Long Term Goals: 24 weeks  1.Report decreased R knee pain < / = 0/10  to increase tolerance for running, biking and swimming. Progressing, not met  2.Patient goal: To be able to return to recreational activities - running, biking, swimming. Progressing, not met  3.Increase strength to >/= 4+/5 in R LE  to increase tolerance for ADL and work activities. Progressing, not met  4. Pt will be able to perform SL squat and lateral bounding without difficulty or compensation in order to return to running. Progressing, not met    Plan     Continue with established Plan of Care towards PT goals, per ACL BTB autograft protocol (Dr. Álvarez).    Kiera Lugo, PT, DPT, OCS

## 2019-12-16 ENCOUNTER — HOSPITAL ENCOUNTER (EMERGENCY)
Facility: HOSPITAL | Age: 34
Discharge: HOME | End: 2019-12-16
Attending: EMERGENCY MEDICINE
Payer: COMMERCIAL

## 2019-12-16 VITALS
WEIGHT: 170 LBS | DIASTOLIC BLOOD PRESSURE: 93 MMHG | SYSTOLIC BLOOD PRESSURE: 143 MMHG | HEIGHT: 70 IN | OXYGEN SATURATION: 99 % | HEART RATE: 74 BPM | RESPIRATION RATE: 18 BRPM | BODY MASS INDEX: 24.34 KG/M2 | TEMPERATURE: 98 F

## 2019-12-16 DIAGNOSIS — S89.92XA INJURY OF LEFT KNEE, INITIAL ENCOUNTER: Primary | ICD-10-CM
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
## 2019-12-16 PROCEDURE — 99284 EMERGENCY DEPT VISIT MOD MDM: CPT | Mod: ,,, | Performed by: PHYSICIAN ASSISTANT

## 2019-12-16 PROCEDURE — 99283 EMERGENCY DEPT VISIT LOW MDM: CPT | Mod: 25

## 2019-12-16 PROCEDURE — 25000003 PHARM REV CODE 250: Performed by: PHYSICIAN ASSISTANT

## 2019-12-16 PROCEDURE — 99284 PR EMERGENCY DEPT VISIT,LEVEL IV: ICD-10-PCS | Mod: ,,, | Performed by: PHYSICIAN ASSISTANT

## 2019-12-16 RX ORDER — ACETAMINOPHEN 325 MG/1
800 TABLET ORAL EVERY 6 HOURS PRN
COMMUNITY

## 2019-12-16 RX ORDER — ACETAMINOPHEN 500 MG
1000 TABLET ORAL
Status: COMPLETED | OUTPATIENT
Start: 2019-12-16 | End: 2019-12-16

## 2019-12-16 RX ADMIN — ACETAMINOPHEN 1000 MG: 500 TABLET ORAL at 07:12

## 2019-12-16 NOTE — ED TRIAGE NOTES
Pt slipped and fell at work.pt reports right knee pain (had acl surgery 4 weeks ago). Pt reports trouble extending his knee. Pt denies loc or hitting his head.

## 2019-12-16 NOTE — ED NOTES
Patient identifiers verified and correct for Jovanni Osasona   LOC: The patient is awake, alert and aware of environment with an appropriate affect, the patient is oriented x 3 and speaking appropriately.   APPEARANCE: Patient appears comfortable and in no acute distress, patient is clean and well groomed.  SKIN: The skin is warm and dry, color consistent with ethnicity, patient has normal skin turgor and moist mucus membranes, skin intact, no breakdown or bruising noted.   MUSCULOSKELETAL:Pt slipped and fell at work.pt reports right knee pain (had acl surgery 4 weeks ago). Pt reports trouble extending his knee. Pt denies loc or hitting his head.    RESPIRATORY: Airway is open and patent, respirations are spontaneous, patient has a normal effort and rate, no accessory muscle use noted,CARDIAC: Patient has a normal rate and regular rhythm, no edema noted, capillary refill < 3 seconds.   GASTRO: Soft and non tender to palpation, no distention noted, normoactive bowel sounds present in all four quadrants. Pt states bowel movements have been regular.  : Pt denies any pain or frequency with urination.  NEURO: Pt opens eyes spontaneously, behavior appropriate to situation, follows commands, facial expression symmetrical, bilateral hand grasp equal and even, purposeful motor response noted, normal sensation in all extremities when touched with a finger.

## 2019-12-16 NOTE — ED PROVIDER NOTES
Encounter Date: 12/16/2019       History     Chief Complaint   Patient presents with    Fall     slipped and fell while at work, r knee pain  had surg 4 weeks ago, denies loc     34 year old male presenting to the ED the chief complaint of fall. Patient experienced mechanical ground-level fall 40 minutes prior to arrival. He reports slipping on wet tile and falling forward. He reports falling on to his right knee. He denies head trauma or LOC. He reports having pain overlying his right patella. He has not attempted to put weight on his right leg since the fall. Patient recently underwent ACL reconstruction to his right knee with Dr. Álvarez on 11/18/2019. He reports taking Tylenol and IBU for pain at home. He reports last taking 800mg IBU at 4pm today.         Review of patient's allergies indicates:  No Known Allergies  History reviewed. No pertinent past medical history.  Past Surgical History:   Procedure Laterality Date    KNEE ARTHROSCOPY W/ ACL RECONSTRUCTION Right 11/18/2019    Procedure: RECONSTRUCTION, KNEE, ACL, ARTHROSCOPIC;  Surgeon: JOSEF Álvarez MD;  Location: [facility];  Service: Orthopedics;  Laterality: Right;  REGIONAL W/CATHETER, ADDUCTOR     Family History   Problem Relation Age of Onset    Hypertension Mother     Stroke Mother 60    Hypothyroidism Mother     Hypertension Father     Tremor Father     Polycystic kidney disease Sister         transplant as kid     Social History     Tobacco Use    Smoking status: Never Smoker    Smokeless tobacco: Never Used   Substance Use Topics    Alcohol use: Yes     Frequency: 2-4 times a month     Drinks per session: 1 or 2    Drug use: Never     Review of Systems   Constitutional: Negative for chills, diaphoresis and fever.   HENT: Negative for congestion, sore throat and trouble swallowing.    Eyes: Negative for pain, redness and visual disturbance.   Respiratory: Negative for cough and shortness of breath.    Cardiovascular: Negative for chest  pain.   Gastrointestinal: Negative for abdominal pain, constipation, diarrhea, nausea and vomiting.   Genitourinary: Negative for dysuria and hematuria.   Musculoskeletal: Positive for arthralgias. Negative for neck pain and neck stiffness.   Skin: Negative for wound.   Neurological: Negative for weakness, light-headedness and headaches.       Physical Exam     Initial Vitals [12/16/19 1712]   BP Pulse Resp Temp SpO2   (!) 143/93 74 18 98.1 °F (36.7 °C) 99 %      MAP       --         Physical Exam    Constitutional: He appears well-developed and well-nourished. He is not diaphoretic. No distress.   HENT:   Head: Normocephalic and atraumatic.   Mouth/Throat: Oropharynx is clear and moist. No oropharyngeal exudate.   Eyes: EOM are normal. Pupils are equal, round, and reactive to light.   Neck: Normal range of motion. Neck supple.   Cardiovascular: Normal rate, regular rhythm and intact distal pulses.   +2 DP pulses   Pulmonary/Chest: Breath sounds normal. No respiratory distress. He has no wheezes.   Abdominal: Soft. There is no tenderness.   Musculoskeletal: Normal range of motion.   Well-healed vertical surgical incision overlying R patella. No dehiscence. Limited ROM of R knee 2/2 pain.    Neurological: He is alert and oriented to person, place, and time. He has normal strength. No cranial nerve deficit or sensory deficit.   Skin: Skin is warm and dry. No rash noted. No erythema.       ED Course   Procedures  Labs Reviewed - No data to display       Imaging Results    None          Medical Decision Making:   History:   Old Medical Records: I decided to obtain old medical records.  Old Records Summarized: records from clinic visits.  Clinical Tests:   Radiological Study: Ordered and Reviewed       APC / Resident Notes:   34 year old male presenting to the ED c/o right knee pain s/p mechanical fall 40 minutes PTA. DDx includes but not limited to muscular strain, ligament injury, hardware complication, fracture,  dislocation. I have considered but do not suspect compartment syndrome, arterial occlusion, DVT. Will check x-ray. Patient denies analgesics.      X-ray shows linear lucency of mid pole of patella, likely seen on previous exam. No focal tenderness over region of concern on exam. Surgical changes stable. Patient stable for discharge with outpatient follow-up with Orthopedics. Crutches administered and advised patient to be weight bearing as tolerating. He will continue Tylenol and IBU for pain at home. Work excuse given. Patient expresses understanding and agreeable to the plan. Return to ED precautions given for new, worsening, or concerning symptoms.                          Clinical Impression:       ICD-10-CM ICD-9-CM   1. Injury of left knee, initial encounter S89.92XA 959.7   2. Fall, initial encounter W19.XXXA E888.9         Disposition:   Disposition: Discharged  Condition: Stable                     Mac Blackburn PA-C  12/16/19 9974

## 2019-12-17 ENCOUNTER — TELEPHONE (OUTPATIENT)
Dept: SPORTS MEDICINE | Facility: CLINIC | Age: 34
End: 2019-12-17

## 2019-12-17 ENCOUNTER — HOSPITAL ENCOUNTER (OUTPATIENT)
Dept: RADIOLOGY | Facility: HOSPITAL | Age: 34
Discharge: HOME | End: 2019-12-17
Attending: ORTHOPAEDIC SURGERY
Payer: COMMERCIAL

## 2019-12-17 DIAGNOSIS — M25.561 ACUTE PAIN OF RIGHT KNEE: ICD-10-CM

## 2019-12-17 DIAGNOSIS — M25.561 ACUTE PAIN OF RIGHT KNEE: Primary | ICD-10-CM

## 2019-12-17 PROCEDURE — 73700 CT LOWER EXTREMITY W/O DYE: CPT | Mod: TC,RT

## 2019-12-17 PROCEDURE — 73700 CT KNEE WITHOUT CONTRAST RIGHT: ICD-10-PCS | Mod: 26,RT,, | Performed by: RADIOLOGY

## 2019-12-17 PROCEDURE — 73700 CT LOWER EXTREMITY W/O DYE: CPT | Mod: 26,RT,, | Performed by: RADIOLOGY

## 2019-12-17 NOTE — DISCHARGE INSTRUCTIONS
Use the provided crutches for ambulation assistance  You may bear weight on your left leg as tolerated with pain  Follow-up with Dr. Álvarez for further evaluation  Continue Tylenol and Ibuprofen at home for further management of your pain    Return to the emergency room for new, worsening, or concerning symptoms

## 2019-12-17 NOTE — TELEPHONE ENCOUNTER
The patient slipped on wet floor in the hospital yesterday and fell on his flexed operative knee. 4 weeks out from BPTB ACL recon. Xrays concerning for non-displaced transverse fx of patella. I've contacted the patient this AM to make sure he's in his T-scope brace locked in extension. WBAT. Will get CT this AM to further evaluate.

## 2019-12-17 NOTE — TELEPHONE ENCOUNTER
----- Message from Adriana Polk sent at 12/17/2019  4:32 PM CST -----  Contact: Pt   Pt stated physician wanted him to schedule appt for 12/19/2019 @ 4:00PM @ [facility]     Pt can be reached at [phone]

## 2019-12-18 NOTE — PROGRESS NOTES
S:Jovanni Kerr presents for post-operative evaluation.     DATE OF PROCEDURE: 11/18/2019   PROCEDURES PERFORMED:   Right knee arthroscopic bone patellar tendon bone autograft ACL reconstruction   Right knee arthroscopic lateral plica band resection   Right knee arthroscopic partial lateral release     Jovanni Kerr returns today 4 wk s/p the above mentioned procedure. He was walking around the hospital on Monday & slipped on some water onto a flexed R Knee. Reports immediate pain and difficulty with ambulation. XR and CT were ordered & I discussed these with him via telephone which were negative for a patellar fracture. I recommended that he get back into the t-scope brace with it locked in extension for max protection this week. Was going to PT 1-2 x Week at the [place] location, has not been in this week due to the fall. States his pain level is predominately back to where it was prior to his fall. He is having some persistent anterior knee pain. 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 No mechanical symptoms. No instability complaints. No swelling issues.     O: RLE - The incisions are well healed. No signs of infection. Trace effusion. Mild prepatellar soft tissue swelling. No significant pain or unusual tenderness. Full passive extension. Flexion to 100 degrees with some pain anteriorly. Negative Lachman. NVI distally. Quad activates well with good control.    Radiographs 12/16: S/p ACLR. Tunnels in an acceptable position. Noted bone plug positioning and small femoral screw divergence from the plug. - no changes from prior films.  CT R Knee 12/19: No fracture or dislocation.  Specifically no patellar fracture.     A/P: Arthroscopic pictures were reviewed with the patient. Anterior knee pain after recent fall and increased activity working in the hospital.  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  He does wear a 3D knee sleeve which seems to help. I  recommended dialing back the extended time on his feet if possible. This will help with some of the discomfort and swelling. Plan to follow the rehab plan as previously outlined otherwise. D/c the t-scope now. Expected release for sports no earlier than 8-9 months following Sport Cord testing. RTC in 3 weeks. All questions answered.

## 2019-12-19 ENCOUNTER — CLINICAL SUPPORT (OUTPATIENT)
Dept: REHABILITATION | Facility: HOSPITAL | Age: 34
End: 2019-12-19
Payer: COMMERCIAL

## 2019-12-19 ENCOUNTER — OFFICE VISIT (OUTPATIENT)
Dept: SPORTS MEDICINE | Facility: CLINIC | Age: 34
End: 2019-12-19
Payer: COMMERCIAL

## 2019-12-19 VITALS
BODY MASS INDEX: 24.34 KG/M2 | WEIGHT: 170 LBS | DIASTOLIC BLOOD PRESSURE: 87 MMHG | HEART RATE: 81 BPM | SYSTOLIC BLOOD PRESSURE: 132 MMHG | HEIGHT: 70 IN

## 2019-12-19 DIAGNOSIS — M25.561 ACUTE PAIN OF RIGHT KNEE: ICD-10-CM

## 2019-12-19 DIAGNOSIS — Z47.89 SURGICAL AFTERCARE, MUSCULOSKELETAL SYSTEM: Primary | ICD-10-CM

## 2019-12-19 PROCEDURE — 99024 POSTOP FOLLOW-UP VISIT: CPT | Mod: S$GLB,,, | Performed by: ORTHOPAEDIC SURGERY

## 2019-12-19 PROCEDURE — 99999 PR PBB SHADOW E&M-EST. PATIENT-LVL III: ICD-10-PCS | Mod: PBBFAC,,, | Performed by: ORTHOPAEDIC SURGERY

## 2019-12-19 PROCEDURE — 97112 NEUROMUSCULAR REEDUCATION: CPT

## 2019-12-19 PROCEDURE — 99999 PR PBB SHADOW E&M-EST. PATIENT-LVL III: CPT | Mod: PBBFAC,,, | Performed by: ORTHOPAEDIC SURGERY

## 2019-12-19 PROCEDURE — 99024 PR POST-OP FOLLOW-UP VISIT: ICD-10-PCS | Mod: S$GLB,,, | Performed by: ORTHOPAEDIC SURGERY

## 2019-12-19 PROCEDURE — 97140 MANUAL THERAPY 1/> REGIONS: CPT

## 2019-12-19 PROCEDURE — 97110 THERAPEUTIC EXERCISES: CPT

## 2019-12-19 RX ORDER — NAPROXEN SODIUM 220 MG
220 TABLET ORAL
Status: ON HOLD | COMMUNITY
End: 2020-10-09 | Stop reason: HOSPADM

## 2019-12-20 NOTE — PROGRESS NOTES
Physical Therapy Daily Treatment Note     Name: Jovanni Kerr  Clinic Number: [id number]    Therapy Diagnosis:   Encounter Diagnosis   Name Primary?    Acute pain of right knee      Physician: Martín Zendejas, *    Visit Date: 12/19/2019  Physician Orders: PT Eval and Treat   Medical Diagnosis from Referral: S83.511A (ICD-10-CM) - Rupture of anterior cruciate ligament of right knee, initial encounter  Evaluation Date: 11/19/2019  Authorization Period Expiration: 12/31/19  Plan of Care Expiration: 5/5/2020  Visit # / Visits authorized: 5/ 18  Visit # total: 6     Time In: 1725  Time Out: 1810  Total Billable Time: 40 minutes     Precautions: Standard, Patient will weight bear as tolerates with the brace locked in extension. Range of motion may be full. Plan to follow an ACL autograft rehab protocol. Initial goals include maintenance of full knee extension, regaining flexion, swelling control, and quadriceps activation exercises. May discontinue the brace was the quad is firing well. Expected release for sports no earlier than 8-9 months following Sport Cord testing.        PROCEDURES PERFORMED on 11/18/19:   Right knee arthroscopic bone patellar tendon bone autograft ACL reconstruction (CPT 24521)  Right knee arthroscopic lateral plica band resection (CPT 12264)  Right knee arthroscopic partial lateral release (CPT 46212)    Subjective     Pt reports: slipping on water on Monday causing him to fall on his R knee increasing pain and disability. The pt went to ER and then f.u with MD who took a CT scan to r/o fracture or screw movement. The pt has been cleared by MD to cont with post op care with normal post op restrictions.     He was compliant with home exercise program.  Response to previous treatment: no adverse effects  Functional change: Pt ambulating with soft knee brace.    Pain: 3/10  Location: right knee     Objective     Jovanni received therapeutic exercises to develop strength, endurance, ROM and  flexibility for 15 minutes including:    Long sitting extension hold 5# 10m   Heel slides & QS 5m     Jovanni participated in gait training to improve functional mobility and safety for 0 minutes, including:  Gait training x multiple laps    Jovanni participated in neuromuscular re-education activities to improve: motor control for 5 minutes. The following activities were included:  Quad Sets 5m  TKE 20x   Weight shifts ant 20x     Jovanni received the following manual therapy techniques: Joint mobilizations were applied to the: R knee for 15 minutes, including:    IASTM with cupping to quad and HS   Gentle STM to quad and patella tendon   Fat pad mobs long sitting   Patella mobs long sitting         Home Exercises Provided and Patient Education Provided     Education provided:   - proper     Written Home Exercises Provided: Patient instructed to cont prior HEP.  Exercises were reviewed and Jovanni was able to demonstrate them prior to the end of the session.  Jovanni demonstrated good  understanding of the education provided.     See EMR under Media for exercises provided at IE.    Assessment     The pt firmly educated on importance of normalizing gait and ROM within the next two weeks to ensure proper post op recovery. The pt re-educated on quad sets and extension holds. Initially the patient demo's increased hip musculature activation during quad sets and too much force production without proper superior patella glide- required cueing to improve this and the pt demo'd good carryover.     Jovanni is progressing well towards his goals.   Pt prognosis is Good.     Pt will continue to benefit from skilled outpatient physical therapy to address the deficits listed in the problem list box on initial evaluation, provide pt/family education and to maximize pt's level of independence in the home and community environment.     Pt's spiritual, cultural and educational needs considered and pt agreeable to plan of care and  goals.    Anticipated barriers to physical therapy: work schedule    GOALS: Short Term Goals:  12 weeks  1.Report decreased R knee pain  < / =  2/10  to increase tolerance for walking/stairs Progressing, not met  2. Increase knee ROM to 5 hyperextension to 135 degrees of knee flexion in order to be able to perform ADLs without difficulty. Progressing, not met  3. Increase strength to 4-/5 MMT grade in R LE  to increase tolerance for ADL and work activities. Progressing, not met  4. Pt to tolerate HEP to improve ROM and independence with ADL's Progressing, not met     Long Term Goals: 24 weeks  1.Report decreased R knee pain < / = 0/10  to increase tolerance for running, biking and swimming. Progressing, not met  2.Patient goal: To be able to return to recreational activities - running, biking, swimming. Progressing, not met  3.Increase strength to >/= 4+/5 in R LE  to increase tolerance for ADL and work activities. Progressing, not met  4. Pt will be able to perform SL squat and lateral bounding without difficulty or compensation in order to return to running. Progressing, not met    Plan     Continue with established Plan of Care towards PT goals, per ACL BTB autograft protocol (Dr. Álvarez).    Brenda Royal, PT, DPT, SCS FAAOMPT

## 2019-12-27 ENCOUNTER — CLINICAL SUPPORT (OUTPATIENT)
Dept: REHABILITATION | Facility: HOSPITAL | Age: 34
End: 2019-12-27
Payer: COMMERCIAL

## 2019-12-27 DIAGNOSIS — M25.561 ACUTE PAIN OF RIGHT KNEE: ICD-10-CM

## 2019-12-27 PROCEDURE — 97112 NEUROMUSCULAR REEDUCATION: CPT

## 2019-12-27 PROCEDURE — 97110 THERAPEUTIC EXERCISES: CPT

## 2019-12-27 PROCEDURE — 97140 MANUAL THERAPY 1/> REGIONS: CPT

## 2019-12-27 NOTE — PROGRESS NOTES
Physical Therapy Daily Treatment Note     Name: Jovanni Kerr  Clinic Number: [id number]    Therapy Diagnosis:   Encounter Diagnosis   Name Primary?    Acute pain of right knee      Physician: Martín Zendejas, *    Visit Date: 12/27/2019  Physician Orders: PT Eval and Treat   Medical Diagnosis from Referral: S83.511A (ICD-10-CM) - Rupture of anterior cruciate ligament of right knee, initial encounter  Evaluation Date: 11/19/2019  Authorization Period Expiration: 12/31/19  Plan of Care Expiration: 5/5/2020  Visit # / Visits authorized: 6/ 18  Visit # total: 7     Time In: 11:26  Time Out: 12:00  Total Billable Time: 29 minutes     Precautions: Standard, Patient will weight bear as tolerates with the brace locked in extension. Range of motion may be full. Plan to follow an ACL autograft rehab protocol. Initial goals include maintenance of full knee extension, regaining flexion, swelling control, and quadriceps activation exercises. May discontinue the brace was the quad is firing well. Expected release for sports no earlier than 8-9 months following Sport Cord testing.        PROCEDURES PERFORMED on 11/18/19:   Right knee arthroscopic bone patellar tendon bone autograft ACL reconstruction (CPT 14215)  Right knee arthroscopic lateral plica band resection (CPT 08399)  Right knee arthroscopic partial lateral release (CPT 93065)    Subjective     Pt reports: no complaints today    He was compliant with home exercise program.  Response to previous treatment: no adverse effects  Functional change: Pt ambulating with soft knee brace.    Pain: 3/10  Location: right knee     Objective     ROM: 0-115    Jovanni received therapeutic exercises to develop strength, endurance, ROM and flexibility for 16 minutes including:    Long sitting extension hold 5# 10m   Heel slides & QS 5m   STS w/ yellow crossfit band around B knees 3x10    Jovanni participated in gait training to improve functional mobility and safety for 0  minutes, including:  Gait training x multiple laps    Jovanni participated in neuromuscular re-education activities to improve: motor control for10 minutes. The following activities were included:  Quad Sets 5m  TKE w/ L hip FL x 5m  Weight shifts ant 20x -NT    Jovanni received the following manual therapy techniques: Joint mobilizations were applied to the: R knee for 8 minutes, including:    IASTM with cupping to quad and HS -NT  Gentle STM to quad and patella tendon -NT  AP femoral mobs  Fat pad mobs long sitting   Patella mobs long sitting         Home Exercises Provided and Patient Education Provided     Education provided:   - proper     Written Home Exercises Provided: Patient instructed to cont prior HEP.  Exercises were reviewed and Jovanni was able to demonstrate them prior to the end of the session.  Jovanni demonstrated good  understanding of the education provided.     See EMR under Media for exercises provided at IE.    Assessment     VC needed to prevent limping during gait. Cont to lack hyperext at rest but able to perform actively. Patella tendon pain w/ patella mobs. Fat pad restricted.   Jovanni is progressing well towards his goals.   Pt prognosis is Good.     Pt will continue to benefit from skilled outpatient physical therapy to address the deficits listed in the problem list box on initial evaluation, provide pt/family education and to maximize pt's level of independence in the home and community environment.     Pt's spiritual, cultural and educational needs considered and pt agreeable to plan of care and goals.    Anticipated barriers to physical therapy: work schedule    GOALS: Short Term Goals:  12 weeks  1.Report decreased R knee pain  < / =  2/10  to increase tolerance for walking/stairs Progressing, not met  2. Increase knee ROM to 5 hyperextension to 135 degrees of knee flexion in order to be able to perform ADLs without difficulty. Progressing, not met  3. Increase strength to 4-/5  MMT grade in R LE  to increase tolerance for ADL and work activities. Progressing, not met  4. Pt to tolerate HEP to improve ROM and independence with ADL's Progressing, not met     Long Term Goals: 24 weeks  1.Report decreased R knee pain < / = 0/10  to increase tolerance for running, biking and swimming. Progressing, not met  2.Patient goal: To be able to return to recreational activities - running, biking, swimming. Progressing, not met  3.Increase strength to >/= 4+/5 in R LE  to increase tolerance for ADL and work activities. Progressing, not met  4. Pt will be able to perform SL squat and lateral bounding without difficulty or compensation in order to return to running. Progressing, not met    Plan     Continue with established Plan of Care towards PT goals, per ACL BTB autograft protocol (Dr. Álvarez).    Darleen Cervantes, PTA,

## 2019-12-30 ENCOUNTER — CLINICAL SUPPORT (OUTPATIENT)
Dept: REHABILITATION | Facility: HOSPITAL | Age: 34
End: 2019-12-30
Payer: COMMERCIAL

## 2019-12-30 DIAGNOSIS — M25.561 ACUTE PAIN OF RIGHT KNEE: ICD-10-CM

## 2019-12-30 PROCEDURE — 97110 THERAPEUTIC EXERCISES: CPT

## 2019-12-30 PROCEDURE — 97140 MANUAL THERAPY 1/> REGIONS: CPT

## 2019-12-30 NOTE — PROGRESS NOTES
Physical Therapy Daily Treatment Note     Name: Jovanni Kerr  Clinic Number: [id number]    Therapy Diagnosis:   Encounter Diagnosis   Name Primary?    Acute pain of right knee      Physician: Martín Zendejas, *    Visit Date: 12/30/2019  Physician Orders: PT Eval and Treat   Medical Diagnosis from Referral: S83.511A (ICD-10-CM) - Rupture of anterior cruciate ligament of right knee, initial encounter  Evaluation Date: 11/19/2019  Authorization Period Expiration: 12/31/19  Plan of Care Expiration: 5/5/2020  Visit # / Visits authorized: 7/ 18  Visit # total: 8     Time In: 1615  Time Out: 1510  Total Billable Time: 40 minutes     Precautions: Standard, Patient will weight bear as tolerates with the brace locked in extension. Range of motion may be full. Plan to follow an ACL autograft rehab protocol. Initial goals include maintenance of full knee extension, regaining flexion, swelling control, and quadriceps activation exercises. May discontinue the brace was the quad is firing well. Expected release for sports no earlier than 8-9 months following Sport Cord testing.        PROCEDURES PERFORMED on 11/18/19:   Right knee arthroscopic bone patellar tendon bone autograft ACL reconstruction (CPT 33675)  Right knee arthroscopic lateral plica band resection (CPT 73843)  Right knee arthroscopic partial lateral release (CPT 45406)    Subjective     Pt reports: overall his knee feels better than after his fall but cont to have intermittent stiffness and difficulty staying asleep due to stiffness.     He was compliant with home exercise program.  Response to previous treatment: no adverse effects  Functional change: Pt ambulating with soft knee brace.    Pain: 1/10  Location: right knee     Objective     ROM: X-0-115    Jovanni received therapeutic exercises to develop strength, endurance, ROM and flexibility for 16 minutes including:    SAQ bolster 30x   SAQ 1/2 foam 15x 5s hold   SLR 1x10 wth PT A, 4x10  independently  Calf raises with visual feedback for TKE 3x15   Lateral walks <-> 30 feet gtb 3x     Jovanni participated in gait training to improve functional mobility and safety for 0 minutes, including:  Gait training x multiple laps    Jovanni participated in neuromuscular re-education activities to improve: motor control for10 minutes. The following activities were included:  Quad Sets 5m  TKE w/ L hip FL x 5m  Weight shifts ant 20x -NT    Jovanni received the following manual therapy techniques: Joint mobilizations were applied to the: R knee for 10 minutes, including:    IASTM to fat pad, patella tendon and quad   AP femoral mobs  Fat pad mobs long sitting   Patella mobs long sitting     Home Exercises Provided and Patient Education Provided     Education provided:   - proper gait   - foam rolling to dec ms stiffness perception     Written Home Exercises Provided: Patient instructed to cont prior HEP.  Exercises were reviewed and Jovanni was able to demonstrate them prior to the end of the session.  Jovanni demonstrated good  understanding of the education provided.     See EMR under Media for exercises provided at IE.    Assessment     The pt with cont fat pad restrictions that seem to be limiting superior glide of patella thereby further inhibiting his tolerance to quad activation. The pt with improvement in hip flexor use following therex. The pt with no pain following treatment.     Jovanni is progressing well towards his goals.   Pt prognosis is Good.     Pt will continue to benefit from skilled outpatient physical therapy to address the deficits listed in the problem list box on initial evaluation, provide pt/family education and to maximize pt's level of independence in the home and community environment.     Pt's spiritual, cultural and educational needs considered and pt agreeable to plan of care and goals.    Anticipated barriers to physical therapy: work schedule    GOALS: Short Term Goals:  12  weeks  1.Report decreased R knee pain  < / =  2/10  to increase tolerance for walking/stairs Progressing, not met  2. Increase knee ROM to 5 hyperextension to 135 degrees of knee flexion in order to be able to perform ADLs without difficulty. Progressing, not met  3. Increase strength to 4-/5 MMT grade in R LE  to increase tolerance for ADL and work activities. Progressing, not met  4. Pt to tolerate HEP to improve ROM and independence with ADL's Progressing, not met     Long Term Goals: 24 weeks  1.Report decreased R knee pain < / = 0/10  to increase tolerance for running, biking and swimming. Progressing, not met  2.Patient goal: To be able to return to recreational activities - running, biking, swimming. Progressing, not met  3.Increase strength to >/= 4+/5 in R LE  to increase tolerance for ADL and work activities. Progressing, not met  4. Pt will be able to perform SL squat and lateral bounding without difficulty or compensation in order to return to running. Progressing, not met    Plan     Continue with established Plan of Care towards PT goals, per ACL BTB autograft protocol (Dr. Álvarez).    Brenda Royal, PT,

## 2020-01-02 ENCOUNTER — CLINICAL SUPPORT (OUTPATIENT)
Dept: REHABILITATION | Facility: HOSPITAL | Age: 35
End: 2020-01-02
Attending: ORTHOPAEDIC SURGERY
Payer: COMMERCIAL

## 2020-01-02 DIAGNOSIS — M25.561 ACUTE PAIN OF RIGHT KNEE: ICD-10-CM

## 2020-01-02 PROCEDURE — 97112 NEUROMUSCULAR REEDUCATION: CPT

## 2020-01-02 PROCEDURE — 97140 MANUAL THERAPY 1/> REGIONS: CPT

## 2020-01-02 PROCEDURE — 97110 THERAPEUTIC EXERCISES: CPT

## 2020-01-02 NOTE — PROGRESS NOTES
Physical Therapy Daily Treatment Note     Name: Jovanni Kerr  Clinic Number: [id number]    Therapy Diagnosis:   Encounter Diagnosis   Name Primary?    Acute pain of right knee      Physician: JOSEF Álvarez MD    Visit Date: 1/2/2020  Physician Orders: PT Eval and Treat   Medical Diagnosis from Referral: S83.511A (ICD-10-CM) - Rupture of anterior cruciate ligament of right knee, initial encounter  Evaluation Date: 11/19/2019  Authorization Period Expiration: 12/31/19  Plan of Care Expiration: 5/5/2020  Visit # / Visits authorized: 8/ 18  Visit # total: 9     Time In: 1515  Time Out: 1615  Total Billable Time: 30 minutes     Precautions: Standard, Patient will weight bear as tolerates with the brace locked in extension. Range of motion may be full. Plan to follow an ACL autograft rehab protocol. Initial goals include maintenance of full knee extension, regaining flexion, swelling control, and quadriceps activation exercises. May discontinue the brace was the quad is firing well. Expected release for sports no earlier than 8-9 months following Sport Cord testing.        PROCEDURES PERFORMED on 11/18/19:   Right knee arthroscopic bone patellar tendon bone autograft ACL reconstruction (CPT 96036)  Right knee arthroscopic lateral plica band resection (CPT 39357)  Right knee arthroscopic partial lateral release (CPT 87339)    Subjective     Pt reports: overall his knee feels better than after his fall but cont to have intermittent stiffness and difficulty staying asleep due to stiffness.     He was compliant with home exercise program.  Response to previous treatment: no adverse effects  Functional change: Pt ambulating with soft knee brace.    Pain: 1/10  Location: right knee     Objective     ROM: X-0-115    Jovanni received therapeutic exercises to develop strength, endurance, ROM and flexibility for 16 minutes including:    Bike completed for 10 min to increase ROM, endurance and decrease pain to improve  tolerance to ADLs and age related activities.     [id number] independently  DL Shuttle press 3x15 4 cords   SL shuttle press 4x5 B   Inchworms <-?     Jovanni participated in gait training to improve functional mobility and safety for 0 minutes, including:  Gait training x multiple laps    Jovanni participated in neuromuscular re-education activities to improve: motor control for 15 minutes. The following activities were included:  Lunge Matrix 15x ea way   SAQ 1/2 foam 15x 5s hold     * Attempted sliding lunges but pt unable to demo control of R LE    Jovanni received the following manual therapy techniques: Joint mobilizations were applied to the: R knee for 10 minutes, including:    IASTM to fat pad, patella tendon and quad   AP femoral mobs  Fat pad mobs long sitting   Patella mobs long sitting     Home Exercises Provided and Patient Education Provided     Education provided:   - proper gait   - foam rolling to dec ms stiffness perception     Written Home Exercises Provided: Patient instructed to cont prior HEP.  Exercises were reviewed and Jovanni was able to demonstrate them prior to the end of the session.  Jovanni demonstrated good  understanding of the education provided.     See EMR under Media for exercises provided at .    Assessment     The pt with significant weakness of R LE demonstrates during SL activities and asymmetrical lunges. The pt unable to tolerate sliding lunges and so had to revert back to the lunge matrix. The pt with cont restrictions in anterior knee leading to stiffness and poor quad control ecc/ concentrically. The pt advised on a new HEP and plan to f/u to determine compliance.     Jovanni is progressing well towards his goals.   Pt prognosis is Good.     Pt will continue to benefit from skilled outpatient physical therapy to address the deficits listed in the problem list box on initial evaluation, provide pt/family education and to maximize pt's level of independence in the home and  community environment.     Pt's spiritual, cultural and educational needs considered and pt agreeable to plan of care and goals.    Anticipated barriers to physical therapy: work schedule    GOALS: Short Term Goals:  12 weeks  1.Report decreased R knee pain  < / =  2/10  to increase tolerance for walking/stairs Progressing, not met  2. Increase knee ROM to 5 hyperextension to 135 degrees of knee flexion in order to be able to perform ADLs without difficulty. Progressing, not met  3. Increase strength to 4-/5 MMT grade in R LE  to increase tolerance for ADL and work activities. Progressing, not met  4. Pt to tolerate HEP to improve ROM and independence with ADL's Progressing, not met     Long Term Goals: 24 weeks  1.Report decreased R knee pain < / = 0/10  to increase tolerance for running, biking and swimming. Progressing, not met  2.Patient goal: To be able to return to recreational activities - running, biking, swimming. Progressing, not met  3.Increase strength to >/= 4+/5 in R LE  to increase tolerance for ADL and work activities. Progressing, not met  4. Pt will be able to perform SL squat and lateral bounding without difficulty or compensation in order to return to running. Progressing, not met    Plan     Continue with established Plan of Care towards PT goals, per ACL BTB autograft protocol (Dr. Álvarez).    Brenda Royal, PT,

## 2020-01-07 ENCOUNTER — CLINICAL SUPPORT (OUTPATIENT)
Dept: REHABILITATION | Facility: HOSPITAL | Age: 35
End: 2020-01-07
Attending: ORTHOPAEDIC SURGERY
Payer: COMMERCIAL

## 2020-01-07 ENCOUNTER — OFFICE VISIT (OUTPATIENT)
Dept: SPORTS MEDICINE | Facility: CLINIC | Age: 35
End: 2020-01-07
Payer: COMMERCIAL

## 2020-01-07 VITALS
WEIGHT: 170 LBS | HEIGHT: 70 IN | SYSTOLIC BLOOD PRESSURE: 124 MMHG | HEART RATE: 67 BPM | DIASTOLIC BLOOD PRESSURE: 79 MMHG | BODY MASS INDEX: 24.34 KG/M2

## 2020-01-07 DIAGNOSIS — M25.561 ACUTE PAIN OF RIGHT KNEE: ICD-10-CM

## 2020-01-07 DIAGNOSIS — Z47.89 SURGICAL AFTERCARE, MUSCULOSKELETAL SYSTEM: Primary | ICD-10-CM

## 2020-01-07 PROCEDURE — 97110 THERAPEUTIC EXERCISES: CPT

## 2020-01-07 PROCEDURE — 99999 PR PBB SHADOW E&M-EST. PATIENT-LVL III: CPT | Mod: PBBFAC,,, | Performed by: ORTHOPAEDIC SURGERY

## 2020-01-07 PROCEDURE — 99024 PR POST-OP FOLLOW-UP VISIT: ICD-10-PCS | Mod: S$GLB,,, | Performed by: ORTHOPAEDIC SURGERY

## 2020-01-07 PROCEDURE — 97112 NEUROMUSCULAR REEDUCATION: CPT

## 2020-01-07 PROCEDURE — 97140 MANUAL THERAPY 1/> REGIONS: CPT

## 2020-01-07 PROCEDURE — 99999 PR PBB SHADOW E&M-EST. PATIENT-LVL III: ICD-10-PCS | Mod: PBBFAC,,, | Performed by: ORTHOPAEDIC SURGERY

## 2020-01-07 PROCEDURE — 99024 POSTOP FOLLOW-UP VISIT: CPT | Mod: S$GLB,,, | Performed by: ORTHOPAEDIC SURGERY

## 2020-01-07 NOTE — PROGRESS NOTES
Physical Therapy Daily Treatment Note     Name: Jovanni Kerr  Clinic Number: [id number]    Therapy Diagnosis:   Encounter Diagnosis   Name Primary?    Acute pain of right knee      Physician: Martín Zendejas, *    Visit Date: 1/7/2020  Physician Orders: PT Eval and Treat   Medical Diagnosis from Referral: S83.511A (ICD-10-CM) - Rupture of anterior cruciate ligament of right knee, initial encounter  Evaluation Date: 11/19/2019  Authorization Period Expiration: 12/31/2020  Plan of Care Expiration: 5/5/2020  Visit # / Visits authorized: 2/ 30  Visit # total: 12     Time In: 1700  Time Out: 1800  Total Billable Time:55 minutes     Precautions: Standard, Patient will weight bear as tolerates with the brace locked in extension. Range of motion may be full. Plan to follow an ACL autograft rehab protocol. Initial goals include maintenance of full knee extension, regaining flexion, swelling control, and quadriceps activation exercises. May discontinue the brace was the quad is firing well. Expected release for sports no earlier than 8-9 months following Sport Cord testing.        PROCEDURES PERFORMED on 11/18/19:   Right knee arthroscopic bone patellar tendon bone autograft ACL reconstruction (CPT 83833)  Right knee arthroscopic lateral plica band resection (CPT 35615)  Right knee arthroscopic partial lateral release (CPT 09998)    Subjective     Pt reports: that he continues to have stiffness in R knee.  States that he saw MD who told him that he needs to be doing patellar mobs on his own.    He was compliant with home exercise program.  Response to previous treatment: no adverse effects  Functional change: Pt ambulating with soft knee brace.    Pain: 1/10  Location: right knee     Objective     ROM: X-0-115    Jovanni received therapeutic exercises to develop strength, endurance, ROM and flexibility for 25 minutes including:    Bike completed for 10 min to increase ROM, endurance and decrease pain to improve  tolerance to ADLs and age related activities.     [id number] independently  DL Shuttle press 3x15 4 blk   SL shuttle press 3x8 R only 2 blk   Inchworms x 8    Jovanni participated in gait training to improve functional mobility and safety for 0 minutes, including:  Gait training x multiple laps    Jovanni participated in neuromuscular re-education activities to improve: motor control for 15 minutes. The following activities were included:  Lunge Matrix 15x ea way B  SAQ 1/2 foam 15x 5s hold       Jovanni received the following manual therapy techniques: Joint mobilizations were applied to the: R knee for 15 minutes, including:    IASTM to fat pad, patella tendon and quad NP  AP femoral mobs  Fat pad mobs long sitting   Patella mobs long sitting   Passive knee flexion stretching in supine      Home Exercises Provided and Patient Education Provided     Education provided:   - proper gait   - foam rolling to dec ms stiffness perception     Written Home Exercises Provided: Patient instructed to cont prior HEP.  Exercises were reviewed and Jovanni was able to demonstrate them prior to the end of the session.  Jovanni demonstrated good  understanding of the education provided.     See EMR under Media for exercises provided at .    Assessment     Pt continues to demonstrate anterior knee stiffness leading to quad weakness both eccentrically and concentrically.  Improved control with matrix lunges, however most difficulty with lateral lunges.  Pt required tactile cueing for proper form.  After treatment session, increased muscular fatigue noted.  Pt able to achieve 119 degrees of knee flexion today after mobs and stretching.  Advised pt to continue HEP, patellar/fat pad mobs, and ROM.      Jovanni is progressing well towards his goals.   Pt prognosis is Good.     Pt will continue to benefit from skilled outpatient physical therapy to address the deficits listed in the problem list box on initial evaluation, provide pt/family  education and to maximize pt's level of independence in the home and community environment.     Pt's spiritual, cultural and educational needs considered and pt agreeable to plan of care and goals.    Anticipated barriers to physical therapy: work schedule    GOALS: Short Term Goals:  12 weeks  1.Report decreased R knee pain  < / =  2/10  to increase tolerance for walking/stairs Progressing, not met  2. Increase knee ROM to 5 hyperextension to 135 degrees of knee flexion in order to be able to perform ADLs without difficulty. Progressing, not met  3. Increase strength to 4-/5 MMT grade in R LE  to increase tolerance for ADL and work activities. Progressing, not met  4. Pt to tolerate HEP to improve ROM and independence with ADL's Progressing, not met     Long Term Goals: 24 weeks  1.Report decreased R knee pain < / = 0/10  to increase tolerance for running, biking and swimming. Progressing, not met  2.Patient goal: To be able to return to recreational activities - running, biking, swimming. Progressing, not met  3.Increase strength to >/= 4+/5 in R LE  to increase tolerance for ADL and work activities. Progressing, not met  4. Pt will be able to perform SL squat and lateral bounding without difficulty or compensation in order to return to running. Progressing, not met    Plan     Continue with established Plan of Care towards PT goals, per ACL BTB autograft protocol (Dr. Álvarez).    Kiera Lugo, PT, DPT

## 2020-01-08 ENCOUNTER — HOSPITAL ENCOUNTER (EMERGENCY)
Facility: HOSPITAL | Age: 35
Discharge: HOME | End: 2020-01-08
Attending: EMERGENCY MEDICINE
Payer: COMMERCIAL

## 2020-01-08 VITALS
WEIGHT: 170 LBS | SYSTOLIC BLOOD PRESSURE: 117 MMHG | HEART RATE: 85 BPM | DIASTOLIC BLOOD PRESSURE: 75 MMHG | TEMPERATURE: 99 F | OXYGEN SATURATION: 99 % | RESPIRATION RATE: 18 BRPM | BODY MASS INDEX: 24.34 KG/M2 | HEIGHT: 70 IN
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
## 2020-01-08 LAB
ALBUMIN SERPL BCP-MCNC: 4.8 G/DL (ref 3.5–5.2)
ALP SERPL-CCNC: 67 U/L (ref 55–135)
ALT SERPL W/O P-5'-P-CCNC: 12 U/L (ref 10–44)
ANION GAP SERPL CALC-SCNC: 10 MMOL/L (ref 8–16)
AST SERPL-CCNC: 17 U/L (ref 10–40)
BASOPHILS # BLD AUTO: 0.03 K/UL (ref 0–0.2)
BASOPHILS NFR BLD: 0.6 % (ref 0–1.9)
BILIRUB SERPL-MCNC: 0.5 MG/DL (ref 0.1–1)
BUN SERPL-MCNC: 15 MG/DL (ref 6–20)
CALCIUM SERPL-MCNC: 10.3 MG/DL (ref 8.7–10.5)
CHLORIDE SERPL-SCNC: 104 MMOL/L (ref 95–110)
CO2 SERPL-SCNC: 29 MMOL/L (ref 23–29)
CREAT SERPL-MCNC: 1.3 MG/DL (ref 0.5–1.4)
DIFFERENTIAL METHOD: ABNORMAL
EOSINOPHIL # BLD AUTO: 0.1 K/UL (ref 0–0.5)
EOSINOPHIL NFR BLD: 1 % (ref 0–8)
ERYTHROCYTE [DISTWIDTH] IN BLOOD BY AUTOMATED COUNT: 12 % (ref 11.5–14.5)
EST. GFR  (AFRICAN AMERICAN): >60 ML/MIN/1.73 M^2
EST. GFR  (NON AFRICAN AMERICAN): >60 ML/MIN/1.73 M^2
GLUCOSE SERPL-MCNC: 112 MG/DL (ref 70–110)
HCT VFR BLD AUTO: 43 % (ref 40–54)
HGB BLD-MCNC: 13.4 G/DL (ref 14–18)
HIV1+2 IGG SERPL QL IA.RAPID: NEGATIVE
IMM GRANULOCYTES # BLD AUTO: 0.01 K/UL (ref 0–0.04)
IMM GRANULOCYTES NFR BLD AUTO: 0.2 % (ref 0–0.5)
LYMPHOCYTES # BLD AUTO: 1.5 K/UL (ref 1–4.8)
LYMPHOCYTES NFR BLD: 29.4 % (ref 18–48)
MCH RBC QN AUTO: 29.5 PG (ref 27–31)
MCHC RBC AUTO-ENTMCNC: 31.2 G/DL (ref 32–36)
MCV RBC AUTO: 95 FL (ref 82–98)
MONOCYTES # BLD AUTO: 0.3 K/UL (ref 0.3–1)
MONOCYTES NFR BLD: 4.8 % (ref 4–15)
NEUTROPHILS # BLD AUTO: 3.3 K/UL (ref 1.8–7.7)
NEUTROPHILS NFR BLD: 64 % (ref 38–73)
NRBC BLD-RTO: 0 /100 WBC
PLATELET # BLD AUTO: 201 K/UL (ref 150–350)
PMV BLD AUTO: 12.4 FL (ref 9.2–12.9)
POTASSIUM SERPL-SCNC: 3.9 MMOL/L (ref 3.5–5.1)
PROT SERPL-MCNC: 8.2 G/DL (ref 6–8.4)
RBC # BLD AUTO: 4.54 M/UL (ref 4.6–6.2)
SODIUM SERPL-SCNC: 143 MMOL/L (ref 136–145)
WBC # BLD AUTO: 5.17 K/UL (ref 3.9–12.7)

## 2020-01-08 PROCEDURE — 99284 EMERGENCY DEPT VISIT MOD MDM: CPT | Mod: 25

## 2020-01-08 PROCEDURE — 85025 COMPLETE CBC W/AUTO DIFF WBC: CPT

## 2020-01-08 PROCEDURE — 90471 IMMUNIZATION ADMIN: CPT | Performed by: PHYSICIAN ASSISTANT

## 2020-01-08 PROCEDURE — 90715 TDAP VACCINE 7 YRS/> IM: CPT | Performed by: PHYSICIAN ASSISTANT

## 2020-01-08 PROCEDURE — 86706 HEP B SURFACE ANTIBODY: CPT

## 2020-01-08 PROCEDURE — 86703 HIV-1/HIV-2 1 RESULT ANTBDY: CPT | Mod: 91

## 2020-01-08 PROCEDURE — 80053 COMPREHEN METABOLIC PANEL: CPT

## 2020-01-08 PROCEDURE — 96372 THER/PROPH/DIAG INJ SC/IM: CPT

## 2020-01-08 PROCEDURE — 63600175 PHARM REV CODE 636 W HCPCS: Performed by: PHYSICIAN ASSISTANT

## 2020-01-08 PROCEDURE — 86705 HEP B CORE ANTIBODY IGM: CPT

## 2020-01-08 PROCEDURE — 99283 EMERGENCY DEPT VISIT LOW MDM: CPT | Mod: ,,, | Performed by: PHYSICIAN ASSISTANT

## 2020-01-08 PROCEDURE — 25000003 PHARM REV CODE 250: Performed by: PHYSICIAN ASSISTANT

## 2020-01-08 PROCEDURE — 86703 HIV-1/HIV-2 1 RESULT ANTBDY: CPT

## 2020-01-08 PROCEDURE — 99283 PR EMERGENCY DEPT VISIT,LEVEL III: ICD-10-PCS | Mod: ,,, | Performed by: PHYSICIAN ASSISTANT

## 2020-01-08 RX ORDER — ONDANSETRON 4 MG/1
4 TABLET, ORALLY DISINTEGRATING ORAL EVERY 6 HOURS PRN
Qty: 12 TABLET | Refills: 0 | Status: ON HOLD | OUTPATIENT
Start: 2020-01-08 | End: 2020-10-09 | Stop reason: HOSPADM

## 2020-01-08 RX ORDER — EMTRICITABINE 200 MG/1
200 CAPSULE ORAL DAILY
Qty: 3 CAPSULE | Refills: 0 | Status: SHIPPED | OUTPATIENT
Start: 2020-01-08 | End: 2020-10-07 | Stop reason: CLARIF

## 2020-01-08 RX ORDER — TENOFOVIR DISOPROXIL FUMARATE 300 MG/1
300 TABLET, FILM COATED ORAL
Status: COMPLETED | OUTPATIENT
Start: 2020-01-08 | End: 2020-01-08

## 2020-01-08 RX ORDER — EMTRICITABINE 200 MG/1
200 CAPSULE ORAL
Status: COMPLETED | OUTPATIENT
Start: 2020-01-08 | End: 2020-01-08

## 2020-01-08 RX ORDER — TENOFOVIR DISOPROXIL FUMARATE 300 MG/1
300 TABLET, FILM COATED ORAL DAILY
Qty: 3 TABLET | Refills: 0 | Status: SHIPPED | OUTPATIENT
Start: 2020-01-08 | End: 2020-10-07 | Stop reason: CLARIF

## 2020-01-08 RX ADMIN — RALTEGRAVIR 400 MG: 400 TABLET, FILM COATED ORAL at 07:01

## 2020-01-08 RX ADMIN — CLOSTRIDIUM TETANI TOXOID ANTIGEN (FORMALDEHYDE INACTIVATED), CORYNEBACTERIUM DIPHTHERIAE TOXOID ANTIGEN (FORMALDEHYDE INACTIVATED), BORDETELLA PERTUSSIS TOXOID ANTIGEN (GLUTARALDEHYDE INACTIVATED), BORDETELLA PERTUSSIS FILAMENTOUS HEMAGGLUTININ ANTIGEN (FORMALDEHYDE INACTIVATED), BORDETELLA PERTUSSIS PERTACTIN ANTIGEN, AND BORDETELLA PERTUSSIS FIMBRIAE 2/3 ANTIGEN 0.5 ML: 5; 2; 2.5; 5; 3; 5 INJECTION, SUSPENSION INTRAMUSCULAR at 07:01

## 2020-01-08 RX ADMIN — TENOFOVIR DISPROXIL FUMARATE 300 MG: 300 TABLET ORAL at 07:01

## 2020-01-08 RX ADMIN — EMTRICITABINE 200 MG: 200 CAPSULE ORAL at 07:01

## 2020-01-08 NOTE — PROGRESS NOTES
S:Jovanni Kerr presents for post-operative evaluation.     DATE OF PROCEDURE: 11/18/2019   PROCEDURES PERFORMED:   Right knee arthroscopic bone patellar tendon bone autograft ACL reconstruction   Right knee arthroscopic lateral plica band resection   Right knee arthroscopic partial lateral release     Jovanni Kerr returns today 7 wk s/p the above mentioned procedure. Making progress.  Reports some stiffness in terminal flexion. Denies significant pain.  Remains very active on his feet on a daily basis.    O: RLE - The incisions are well healed. No signs of infection.  Mild anterior knee swelling with some thickening of the fat pad and soft tissue area infrapatellar.  Reasonably good patellar mobility.  Central patellar tracking. No significant pain or unusual tenderness. Full passive extension. Flexion to 120 degrees with some mild pain anteriorly. Negative Lachman. NVI distally. Quad activates but with continued atrophy    Radiographs 12/16: S/p ACLR. Tunnels in an acceptable position. Noted bone plug positioning and small femoral screw divergence from the plug. - no changes from prior films.  CT R Knee 12/19: No fracture or dislocation.  Specifically no patellar fracture.     A/P:  Continue current rehab plan. Discussed a long-leg knee sleeve which can help when he is on his feet with regards to soft tissue swelling.  Also discussed patellar mobilization exercises with passive manipulation around the patellar tendon as well. Continue to work on quadriceps activation.  This has slowed him down a bit as he does have some continued atrophy and decreased activation.  Discussed no running, cutting or pivoting activities.  He verbalized understanding.  Naprosyn as needed. Continue icing.  I will see him back in 6 weeks.

## 2020-01-09 LAB
HBV CORE IGM SERPL QL IA: NEGATIVE
HBV SURFACE AB SER-ACNC: POSITIVE M[IU]/ML
HIV 1+2 AB+HIV1 P24 AG SERPL QL IA: NEGATIVE

## 2020-01-09 NOTE — ED PROVIDER NOTES
Encounter Date: 1/8/2020       History     Chief Complaint   Patient presents with    Body Fluid Exposure     to left middle finger, pta, works in surgery      Patient is a 34-year-old male presenting to ER for evaluation of body fluid exposure.  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  Patient reports needle fingerstick while in surgery.  Patient has no other complaints.  He is otherwise healthy.  No use of blood thinners.    The history is provided by the patient.     Review of patient's allergies indicates:  No Known Allergies  History reviewed. No pertinent past medical history.  Past Surgical History:   Procedure Laterality Date    KNEE ARTHROSCOPY W/ ACL RECONSTRUCTION Right 11/18/2019    Procedure: RECONSTRUCTION, KNEE, ACL, ARTHROSCOPIC;  Surgeon: JOSEF Álvarez MD;  Location: [facility] OR;  Service: Orthopedics;  Laterality: Right;  REGIONAL W/CATHETER, ADDUCTOR     Family History   Problem Relation Age of Onset    Hypertension Mother     Stroke Mother 60    Hypothyroidism Mother     Hypertension Father     Tremor Father     Polycystic kidney disease Sister         transplant as kid     Social History     Tobacco Use    Smoking status: Never Smoker    Smokeless tobacco: Never Used   Substance Use Topics    Alcohol use: Yes     Frequency: 2-4 times a month     Drinks per session: 1 or 2    Drug use: Never     Review of Systems   Constitutional: Negative for chills and fever.   HENT: Negative for congestion.    Respiratory: Negative for cough and shortness of breath.    Cardiovascular: Negative for chest pain.   Gastrointestinal: Negative for abdominal pain and vomiting.   Genitourinary: Negative for dysuria.   Musculoskeletal: Negative for myalgias.   Skin: Negative for rash.        Needle stick    Allergic/Immunologic: Negative for immunocompromised state.   Neurological: Negative for weakness.   Hematological: Does not bruise/bleed easily.       Physical Exam     Initial Vitals [01/08/20  1913]   BP Pulse Resp Temp SpO2   117/75 85 18 98.7 °F (37.1 °C) 99 %      MAP       --         Physical Exam    Vitals reviewed.  Constitutional: He appears well-developed and well-nourished. He is not diaphoretic. No distress.   HENT:   Head: Normocephalic and atraumatic.   Eyes: Conjunctivae and EOM are normal.   Neck: Neck supple.   Neurological: He is alert and oriented to person, place, and time.   Skin: Skin is warm and dry.   Puncture wound left middle finger. Palmar aspect         ED Course   Procedures  Labs Reviewed   CBC W/ AUTO DIFFERENTIAL - Abnormal; Notable for the following components:       Result Value    RBC 4.54 (*)     Hemoglobin 13.4 (*)     Mean Corpuscular Hemoglobin Conc 31.2 (*)     All other components within normal limits   COMPREHENSIVE METABOLIC PANEL - Abnormal; Notable for the following components:    Glucose 112 (*)     All other components within normal limits   RAPID HIV   HIV 1 / 2 ANTIBODY   HEPATITIS B SURFACE ANTIBODY   HEPATITIS B CORE ANTIBODY, IGM          Imaging Results    None                APC / Resident Notes:   Patient was seen in the ER promptly upon arrival.  He is afebrile, no acute distress. Physical examination reveals a puncture wound to the left middle finger.  House supervisor was informed.  Source blood was being tested.  Blood draw was done for the patient in the ED.  Rapid HIV negative. Patient offered HIV prophylaxis which he would like to take.  He was also up-to-date on his tetanus vaccination.  Patient given a 3 day prescription for HIV medication.  Advised him to follow up with employee health in the morning.  He was agreeable to this.  Patient stable for discharge at this time. The care of this patient was overseen by attending physician who agrees with treatment, plan, and disposition.                              Clinical Impression:       ICD-10-CM ICD-9-CM   1. Needlestick injury accident with exposure to body fluid W46.1XXA E920.5          Disposition:   Disposition: Discharged  Condition: Stable                     Iesha Veras PA-C  01/08/20 2335       Iesha Veras PA-C  01/08/20 2333

## 2020-01-14 ENCOUNTER — CLINICAL SUPPORT (OUTPATIENT)
Dept: REHABILITATION | Facility: HOSPITAL | Age: 35
End: 2020-01-14
Attending: ORTHOPAEDIC SURGERY
Payer: COMMERCIAL

## 2020-01-14 DIAGNOSIS — M25.561 ACUTE PAIN OF RIGHT KNEE: ICD-10-CM

## 2020-01-14 PROCEDURE — 97140 MANUAL THERAPY 1/> REGIONS: CPT

## 2020-01-14 PROCEDURE — 97110 THERAPEUTIC EXERCISES: CPT

## 2020-01-14 NOTE — PROGRESS NOTES
Physical Therapy Daily Treatment Note     Name: Jovanni Kerr  Clinic Number: [id number]    Therapy Diagnosis:   Encounter Diagnosis   Name Primary?    Acute pain of right knee      Physician: Martín Zendejas, *    Visit Date: 1/14/2020  Physician Orders: PT Eval and Treat   Medical Diagnosis from Referral: S83.511A (ICD-10-CM) - Rupture of anterior cruciate ligament of right knee, initial encounter  Evaluation Date: 11/19/2019  Authorization Period Expiration: 12/31/2020  Plan of Care Expiration: 5/5/2020  Visit # / Visits authorized: 3/ 30  Visit # total: 12     Time In: 1425  Time Out: 1525  Total Billable Time:25 minutes     Precautions: Standard, Patient will weight bear as tolerates with the brace locked in extension. Range of motion may be full. Plan to follow an ACL autograft rehab protocol. Initial goals include maintenance of full knee extension, regaining flexion, swelling control, and quadriceps activation exercises. May discontinue the brace was the quad is firing well. Expected release for sports no earlier than 8-9 months following Sport Cord testing.        PROCEDURES PERFORMED on 11/18/19:   Right knee arthroscopic bone patellar tendon bone autograft ACL reconstruction (CPT 51235)  Right knee arthroscopic lateral plica band resection (CPT 66108)  Right knee arthroscopic partial lateral release (CPT 83305)    Subjective     Pt reports: that he still isn't getting full knee extension and flexion.      He was compliant with home exercise program.  Response to previous treatment: no adverse effects  Functional change: Pt ambulating with soft knee brace.    Pain: 1/10  Location: right knee     Objective     ROM: X-0-115    Jovanni received therapeutic exercises to develop strength, endurance, ROM and flexibility for 35 minutes including:    Bike completed for 10 min to increase ROM, endurance and decrease pain to improve tolerance to ADLs and age related activities.   Knee extension stretch 10#  "thigh/ 10# shin x 5 min  TKE yellow super cord 30 x 5"   Prone quad stretch 3 x 30"     [id number] independently NP  DL Shuttle press 3x15 3.5 blk   SL shuttle press 3x8 R only 2 blk   Inchworms x 8 NP    Jovanni participated in gait training to improve functional mobility and safety for 0 minutes, including:  Gait training x multiple laps    Jovanni participated in neuromuscular re-education activities to improve: motor control for 10 minutes. The following activities were included:  Lunge Matrix 2 x 10 ea way B  SAQ 1/2 foam 15x 5s hold NP      Jovanni received the following manual therapy techniques: Joint mobilizations were applied to the: R knee for 15 minutes, including:    IASTM to fat pad, patella tendon and quad NP  AP femoral mobs  Fat pad mobs long sitting   Patella mobs long sitting   Passive knee flexion stretching in supine      Home Exercises Provided and Patient Education Provided     Education provided:   - proper gait   - foam rolling to dec ms stiffness perception     Written Home Exercises Provided: Patient instructed to cont prior HEP.Pt   Exercises were reviewed and Jovanni was able to demonstrate them prior to the end of the session.  Jovanni demonstrated good  understanding of the education provided.     See EMR under Media for exercises provided at .    Assessment     Pt continues to require same instructions and education every visit despite pt reporting compliance with HEP.  Pt is lacking 8 degrees of knee extension when compared to L LE and continues to lack knee flexion.   Continues to have difficulty shifting weight onto R LE to properly perform lateral lunge.  Advised pt to continue HEP, patellar/fat pad mobs, and ROM.      Jovanni is progressing well towards his goals.   Pt prognosis is Good.     Pt will continue to benefit from skilled outpatient physical therapy to address the deficits listed in the problem list box on initial evaluation, provide pt/family education and to maximize " pt's level of independence in the home and community environment.     Pt's spiritual, cultural and educational needs considered and pt agreeable to plan of care and goals.    Anticipated barriers to physical therapy: work schedule    GOALS: Short Term Goals:  12 weeks  1.Report decreased R knee pain  < / =  2/10  to increase tolerance for walking/stairs Progressing, not met  2. Increase knee ROM to 5 hyperextension to 135 degrees of knee flexion in order to be able to perform ADLs without difficulty. Progressing, not met  3. Increase strength to 4-/5 MMT grade in R LE  to increase tolerance for ADL and work activities. Progressing, not met  4. Pt to tolerate HEP to improve ROM and independence with ADL's Progressing, not met     Long Term Goals: 24 weeks  1.Report decreased R knee pain < / = 0/10  to increase tolerance for running, biking and swimming. Progressing, not met  2.Patient goal: To be able to return to recreational activities - running, biking, swimming. Progressing, not met  3.Increase strength to >/= 4+/5 in R LE  to increase tolerance for ADL and work activities. Progressing, not met  4. Pt will be able to perform SL squat and lateral bounding without difficulty or compensation in order to return to running. Progressing, not met    Plan     Continue with established Plan of Care towards PT goals, per ACL BTB autograft protocol (Dr. Álvarez).    Kiera Lugo, PT, DPT, OCS

## 2020-01-23 ENCOUNTER — CLINICAL SUPPORT (OUTPATIENT)
Dept: REHABILITATION | Facility: HOSPITAL | Age: 35
End: 2020-01-23
Attending: ORTHOPAEDIC SURGERY
Payer: COMMERCIAL

## 2020-01-23 DIAGNOSIS — M25.561 ACUTE PAIN OF RIGHT KNEE: ICD-10-CM

## 2020-01-23 PROCEDURE — 97140 MANUAL THERAPY 1/> REGIONS: CPT

## 2020-01-23 NOTE — PROGRESS NOTES
Physical Therapy Daily Treatment Note     Name: Jovanni Kerr  Clinic Number: [id number]    Therapy Diagnosis:   Encounter Diagnosis   Name Primary?    Acute pain of right knee      Physician: Martín Zendejas, *    Visit Date: 1/23/2020  Physician Orders: PT Eval and Treat   Medical Diagnosis from Referral: S83.511A (ICD-10-CM) - Rupture of anterior cruciate ligament of right knee, initial encounter  Evaluation Date: 11/19/2019  Authorization Period Expiration: 12/31/2020  Plan of Care Expiration: 5/5/2020  Visit # / Visits authorized: 4/ 30  Visit # total: 13     Time In: 1510  Time Out: 1555  Total Billable Time: 25 minutes     Precautions: Standard, Patient will weight bear as tolerates with the brace locked in extension. Range of motion may be full. Plan to follow an ACL autograft rehab protocol. Initial goals include maintenance of full knee extension, regaining flexion, swelling control, and quadriceps activation exercises. May discontinue the brace was the quad is firing well. Expected release for sports no earlier than 8-9 months following Sport Cord testing.        PROCEDURES PERFORMED on 11/18/19:   Right knee arthroscopic bone patellar tendon bone autograft ACL reconstruction (CPT 13544)  Right knee arthroscopic lateral plica band resection (CPT 26230)  Right knee arthroscopic partial lateral release (CPT 21836)    Subjective     Pt reports: that he has been going to the gym putting weights on his knee to A with ext and though it makes him sore doesn't feel like it has been helping much. Unsure how to help his ext more.      He was compliant with home exercise program.  Response to previous treatment: no adverse effects  Functional change: Pt ambulating with soft knee brace.    Pain: 1/10  Location: right knee     Objective     ROM: X-0-115  Hypomobile fat pad (severe), hypomobile inf/sup patella mob    Jovnani received therapeutic exercises to develop strength, endurance, ROM and flexibility for  "35 minutes including  Bike completed for 10 min to increase ROM, endurance and decrease pain to improve tolerance to ADLs and age related activities.   Knee extension stretch 10# thigh/ 10# shin x 5 min  SAQ 20x     Not Performed Today:   TKE yellow super cord 30 x 5"   Prone quad stretch 3 x 30"     [id number] independently NP  DL Shuttle press 3x15 3.5 blk   SL shuttle press 3x8 R only 2 blk   Inchworms x 8 NP    Jovanni participated in gait training to improve functional mobility and safety for 0 minutes, including:  Gait training x multiple laps    Jovanni participated in neuromuscular re-education activities to improve: motor control for 0 minutes. The following activities were included:  Lunge Matrix 2 x 10 ea way B  SAQ 1/2 foam 15x 5s hold NP    Jovanni received the following manual therapy techniques: Joint mobilizations were applied to the: R knee for 30 minutes, including:  AP femoral mobs  Fat pad mobs long sitting   Patella mobs long sitting   Passive knee flexion stretching in supine  Cupping to HS AROM     DN performed by Ward Steele, PT to the right knee vastus medialis and infrapatellar fat pad  with 0.30mm needles, single stick only, 4 needles applied. Patient consented prior to treatment and no adverse effects with treatment    Home Exercises Provided and Patient Education Provided     Education provided:   - proper gait   - foam rolling to dec ms stiffness perception     Written Home Exercises Provided: Patient instructed to cont prior HEP.Pt   Exercises were reviewed and Jovanni was able to demonstrate them prior to the end of the session.  Jovanni demonstrated good  understanding of the education provided.     See EMR under Media for exercises provided at .    Assessment     The pt has made little progress in regards to patella mobility limiting the quad potential to produce a superior glide thereby inhibiting extension gains. Added dry needling to pt's manual tx performed by Ward Steele " PT, DPT to attempt to improve quality of tissue and superior glide of patella. The pt had a mild improvement in superior glide with production fo extension moment following. The pt once again was heavily educated on importance of quad based exercises throughout the day as well as performing self manual to patella and quad. Will reassess mobility and understanding of education next tx session.     Jovanni is progressing well towards his goals.   Pt prognosis is Good.     Pt will continue to benefit from skilled outpatient physical therapy to address the deficits listed in the problem list box on initial evaluation, provide pt/family education and to maximize pt's level of independence in the home and community environment.     Pt's spiritual, cultural and educational needs considered and pt agreeable to plan of care and goals.    Anticipated barriers to physical therapy: work schedule    GOALS: Short Term Goals:  12 weeks  1.Report decreased R knee pain  < / =  2/10  to increase tolerance for walking/stairs Progressing, not met  2. Increase knee ROM to 5 hyperextension to 135 degrees of knee flexion in order to be able to perform ADLs without difficulty. Progressing, not met  3. Increase strength to 4-/5 MMT grade in R LE  to increase tolerance for ADL and work activities. Progressing, not met  4. Pt to tolerate HEP to improve ROM and independence with ADL's Progressing, not met     Long Term Goals: 24 weeks  1.Report decreased R knee pain < / = 0/10  to increase tolerance for running, biking and swimming. Progressing, not met  2.Patient goal: To be able to return to recreational activities - running, biking, swimming. Progressing, not met  3.Increase strength to >/= 4+/5 in R LE  to increase tolerance for ADL and work activities. Progressing, not met  4. Pt will be able to perform SL squat and lateral bounding without difficulty or compensation in order to return to running. Progressing, not met    Plan     Continue  with established Plan of Care towards PT goals, per ACL BTB autograft protocol (Dr. Álvarez).    Brenda Royal, PT, DPT

## 2020-01-28 ENCOUNTER — TELEPHONE (OUTPATIENT)
Dept: SPORTS MEDICINE | Facility: CLINIC | Age: 35
End: 2020-01-28

## 2020-01-28 ENCOUNTER — CLINICAL SUPPORT (OUTPATIENT)
Dept: REHABILITATION | Facility: HOSPITAL | Age: 35
End: 2020-01-28
Attending: ORTHOPAEDIC SURGERY
Payer: COMMERCIAL

## 2020-01-28 DIAGNOSIS — M25.561 ACUTE PAIN OF RIGHT KNEE: ICD-10-CM

## 2020-01-28 PROCEDURE — 97140 MANUAL THERAPY 1/> REGIONS: CPT

## 2020-01-28 PROCEDURE — 97110 THERAPEUTIC EXERCISES: CPT

## 2020-01-29 NOTE — PROGRESS NOTES
Physical Therapy Daily Treatment Note     Name: Jovanni Kerr  Clinic Number: [id number]    Therapy Diagnosis:   Encounter Diagnosis   Name Primary?    Acute pain of right knee      Physician: Martín Zendejas, *    Visit Date: 1/28/2020  Physician Orders: PT Eval and Treat   Medical Diagnosis from Referral: S83.511A (ICD-10-CM) - Rupture of anterior cruciate ligament of right knee, initial encounter  Evaluation Date: 11/19/2019  Authorization Period Expiration: 12/31/2020  Plan of Care Expiration: 5/5/2020  Visit # / Visits authorized: 5/ 30  Visit # total: 14     Time In: 1715  Time Out: 1800  Total Billable Time: 23 minutes     Precautions: Standard, Patient will weight bear as tolerates with the brace locked in extension. Range of motion may be full. Plan to follow an ACL autograft rehab protocol. Initial goals include maintenance of full knee extension, regaining flexion, swelling control, and quadriceps activation exercises. May discontinue the brace was the quad is firing well. Expected release for sports no earlier than 8-9 months following Sport Cord testing.        PROCEDURES PERFORMED on 11/18/19:   Right knee arthroscopic bone patellar tendon bone autograft ACL reconstruction (CPT 26029)  Right knee arthroscopic lateral plica band resection (CPT 92063)  Right knee arthroscopic partial lateral release (CPT 57144)    Subjective     Pt reports: he has been working on his extension at home, cont to be sore in his knee.     He was compliant with home exercise program.  Response to previous treatment: no adverse effects  Functional change: Pt ambulating with soft knee brace.    Pain: 1/10  Location: right knee     Objective           ROM: X-0-115 with sig over pressure to ext and flexion   Hypomobile fat pad (severe), hypomobile inf/sup patella mob    Jovanni received therapeutic exercises to develop strength, endurance, ROM and flexibility for 35 minutes including  Bike completed for 10 min to  "increase ROM, endurance and decrease pain to improve tolerance to ADLs and age related activities.   Knee extension stretch 10# thigh/ 10# shin x 5 min  SAQ 20x   DL Shuttle press 3x15 3.5 blk   Mini Squats 20x   SLS 20s     Jovanni participated in neuromuscular re-education activities to improve: motor control for 10 minutes. The following activities were included:  Lunge Matrix 2 x 10 ea way B  LAQ 20x     Jovanni received the following manual therapy techniques: Joint mobilizations were applied to the: R knee for 10 minutes, including:  AP femoral mobs  Fat pad mobs long sitting and in knee flexion  Patella mobs long sitting and in slight knee flexion      Home Exercises Provided and Patient Education Provided     Education provided:   - proper gait   - foam rolling to dec ms stiffness perception     Written Home Exercises Provided: Patient instructed to cont prior HEP.Pt   Exercises were reviewed and Jovanni was able to demonstrate them prior to the end of the session.  Jovanni demonstrated good  understanding of the education provided.     See EMR under Media for exercises provided at IE.    Assessment     The pt cont to demonstrate sig deficits of knee extension most likely due to dec mobility of ant knee structures. The pt with poor tolerance to exercises due to lack of knee extension control. Following manual treatment, pt unable to tolerate lunge matrix due to "feeling like he cant control his knee". The pt cont to be highly educated on importance of coming to PT and completing his mobility based exercises at home. Due to cont knee ext deficits, discussed with MD the possibility of obtaining an ext dynasplint for home use.    Jovanni is progressing well towards his goals.   Pt prognosis is Good.     Pt will continue to benefit from skilled outpatient physical therapy to address the deficits listed in the problem list box on initial evaluation, provide pt/family education and to maximize pt's level of " independence in the home and community environment.     Pt's spiritual, cultural and educational needs considered and pt agreeable to plan of care and goals.    Anticipated barriers to physical therapy: work schedule    GOALS: Short Term Goals:  12 weeks  1.Report decreased R knee pain  < / =  2/10  to increase tolerance for walking/stairs Progressing, not met  2. Increase knee ROM to 5 hyperextension to 135 degrees of knee flexion in order to be able to perform ADLs without difficulty. Progressing, not met  3. Increase strength to 4-/5 MMT grade in R LE  to increase tolerance for ADL and work activities. Progressing, not met  4. Pt to tolerate HEP to improve ROM and independence with ADL's Progressing, not met     Long Term Goals: 24 weeks  1.Report decreased R knee pain < / = 0/10  to increase tolerance for running, biking and swimming. Progressing, not met  2.Patient goal: To be able to return to recreational activities - running, biking, swimming. Progressing, not met  3.Increase strength to >/= 4+/5 in R LE  to increase tolerance for ADL and work activities. Progressing, not met  4. Pt will be able to perform SL squat and lateral bounding without difficulty or compensation in order to return to running. Progressing, not met    Plan     Continue with established Plan of Care towards PT goals, per ACL BTB autograft protocol (Dr. Álvarez).    Brenda Royal, PT, DPT

## 2020-01-29 NOTE — TELEPHONE ENCOUNTER
The patient's physical therapist came by to discuss an update.  The patient has lost some of his terminal extension.  Getting into physical therapy once a week but has limitations with regards to his work schedule. He does have some associated discomfort and mild pain in the knee with terminal flexion but this seems to be getting better.  Overall flexion range of motion improved.  I examined the patient. Negative Lachman.  Passive flexion to 120° with overall good patellar mobility.  He is lacking 5° of terminal extension compared to the contralateral side.  Able to achieve neutral extension with significant over pressure.  He has slight hyperextension on the contralateral side.  My recommendation would be to get a DynaSplint for assistance on regaining and retaining the terminal extension. We will get this delivered to soon as possible.  Discussed the importance of working on this at home on his own.  Otherwise continued therapy program.

## 2020-02-04 ENCOUNTER — CLINICAL SUPPORT (OUTPATIENT)
Dept: REHABILITATION | Facility: HOSPITAL | Age: 35
End: 2020-02-04
Payer: COMMERCIAL

## 2020-02-04 DIAGNOSIS — M25.561 ACUTE PAIN OF RIGHT KNEE: ICD-10-CM

## 2020-02-04 PROCEDURE — 97110 THERAPEUTIC EXERCISES: CPT

## 2020-02-04 PROCEDURE — 97530 THERAPEUTIC ACTIVITIES: CPT

## 2020-02-04 PROCEDURE — 97140 MANUAL THERAPY 1/> REGIONS: CPT

## 2020-02-04 PROCEDURE — 97112 NEUROMUSCULAR REEDUCATION: CPT

## 2020-02-05 NOTE — PROGRESS NOTES
Physical Therapy Daily Treatment Note     Name: Jovanni Kerr  Clinic Number: [id number]    Therapy Diagnosis:   Encounter Diagnosis   Name Primary?    Acute pain of right knee      Physician: JOSEF Álvarez MD    Visit Date: 2/4/2020  Physician Orders: PT Eval and Treat   Medical Diagnosis from Referral: S83.511A (ICD-10-CM) - Rupture of anterior cruciate ligament of right knee, initial encounter  Evaluation Date: 11/19/2019  Authorization Period Expiration: 12/31/2020  Plan of Care Expiration: 5/5/2020  Visit # / Visits authorized: 6/ 30  Visit # total: 16     Time In: 1616  Time Out: 1710  Total Billable Time: 45 minutes     Precautions: Standard, Patient will weight bear as tolerates with the brace locked in extension. Range of motion may be full. Plan to follow an ACL autograft rehab protocol. Initial goals include maintenance of full knee extension, regaining flexion, swelling control, and quadriceps activation exercises. May discontinue the brace was the quad is firing well. Expected release for sports no earlier than 8-9 months following Sport Cord testing.        PROCEDURES PERFORMED on 11/18/19:   Right knee arthroscopic bone patellar tendon bone autograft ACL reconstruction (CPT 44864)  Right knee arthroscopic lateral plica band resection (CPT 72772)  Right knee arthroscopic partial lateral release (CPT 34018)    Subjective     Pt reports: he wore the dynasplint and it was uncomfortable.     He was compliant with home exercise program.  Response to previous treatment: no adverse effects  Functional change: Pt ambulating with soft knee brace.    Pain: 1/10  Location: right knee     Objective     ROM: X-0-115 with sig over pressure to ext and flexion   Hypomobile fat pad (severe), hypomobile inf/sup patella mob    Jovanni received therapeutic exercises to develop strength, endurance, ROM and flexibility for 10 minutes including  Lateral walks <-> 3x 30 feet gtb at knees   Shuttle SL 1.5 cords 4x8      Jovanni participated in dynamic functional therapeutic activities to improve functional performance for 10  minutes, including:  Squats to bench (+airex) 40x gtb at knees       Jovanni participated in neuromuscular re-education activities to improve: motor control for 8 minutes. The following activities were included:  SL RDLs 20x B    Jovanni received the following manual therapy techniques: Joint mobilizations were applied to the: R knee for 10 minutes, including:  AP femoral mobs  Fat pad mobs long sitting and in knee flexion  Patella mobs long sitting and in slight knee flexion  IASTM to ant knee in flexion    Jovanni received hot pack for 10 minutes to R knee.      Home Exercises Provided and Patient Education Provided     Education provided:   - proper gait   - foam rolling to dec ms stiffness perception     Written Home Exercises Provided: Patient instructed to cont prior HEP.Pt   Exercises were reviewed and Jovanni was able to demonstrate them prior to the end of the session.  Jovanni demonstrated good  understanding of the education provided.     See EMR under Media for exercises provided at IE.    Assessment     The pt therex focused on WB exercises due to activity the patient has to complete at his work and at home. The pt with inc soreness following therex that I believe is due to the continued severe hypomobiltiy of fat pad/patella ant knee tissues.    Jovanni is progressing well towards his goals.   Pt prognosis is Good.     Pt will continue to benefit from skilled outpatient physical therapy to address the deficits listed in the problem list box on initial evaluation, provide pt/family education and to maximize pt's level of independence in the home and community environment.     Pt's spiritual, cultural and educational needs considered and pt agreeable to plan of care and goals.    Anticipated barriers to physical therapy: work schedule    GOALS: Short Term Goals:  12 weeks  1.Report decreased R  knee pain  < / =  2/10  to increase tolerance for walking/stairs Progressing, not met  2. Increase knee ROM to 5 hyperextension to 135 degrees of knee flexion in order to be able to perform ADLs without difficulty. Progressing, not met  3. Increase strength to 4-/5 MMT grade in R LE  to increase tolerance for ADL and work activities. Progressing, not met  4. Pt to tolerate HEP to improve ROM and independence with ADL's Progressing, not met     Long Term Goals: 24 weeks  1.Report decreased R knee pain < / = 0/10  to increase tolerance for running, biking and swimming. Progressing, not met  2.Patient goal: To be able to return to recreational activities - running, biking, swimming. Progressing, not met  3.Increase strength to >/= 4+/5 in R LE  to increase tolerance for ADL and work activities. Progressing, not met  4. Pt will be able to perform SL squat and lateral bounding without difficulty or compensation in order to return to running. Progressing, not met    Plan     Continue with established Plan of Care towards PT goals, per ACL BTB autograft protocol (Dr. Álvarez).    Brenda Royal, PT, DPT

## 2020-02-06 ENCOUNTER — CLINICAL SUPPORT (OUTPATIENT)
Dept: REHABILITATION | Facility: HOSPITAL | Age: 35
End: 2020-02-06
Payer: COMMERCIAL

## 2020-02-06 DIAGNOSIS — M25.561 ACUTE PAIN OF RIGHT KNEE: ICD-10-CM

## 2020-02-06 PROCEDURE — 97110 THERAPEUTIC EXERCISES: CPT

## 2020-02-06 PROCEDURE — 97530 THERAPEUTIC ACTIVITIES: CPT

## 2020-02-07 NOTE — PROGRESS NOTES
Physical Therapy Daily Treatment Note     Name: Jovanni Kerr  Clinic Number: [id number]    Therapy Diagnosis:   Encounter Diagnosis   Name Primary?    Acute pain of right knee      Physician: JOSEF Álvarez MD    Visit Date: 2/6/2020  Physician Orders: PT Eval and Treat   Medical Diagnosis from Referral: S83.511A (ICD-10-CM) - Rupture of anterior cruciate ligament of right knee, initial encounter  Evaluation Date: 11/19/2019  Authorization Period Expiration: 12/31/2020  Plan of Care Expiration: 5/5/2020  Visit # / Visits authorized: 6/ 30  Visit # total: 16     Time In: 1615  Time Out: 1700  Total Billable Time: 25 minutes     Precautions: Standard, Patient will weight bear as tolerates with the brace locked in extension. Range of motion may be full. Plan to follow an ACL autograft rehab protocol. Initial goals include maintenance of full knee extension, regaining flexion, swelling control, and quadriceps activation exercises. May discontinue the brace was the quad is firing well. Expected release for sports no earlier than 8-9 months following Sport Cord testing.        PROCEDURES PERFORMED on 11/18/19:   Right knee arthroscopic bone patellar tendon bone autograft ACL reconstruction (CPT 03987)  Right knee arthroscopic lateral plica band resection (CPT 08170)  Right knee arthroscopic partial lateral release (CPT 92980)    Subjective     Pt reports: his knee is OK- has been wearing dynasplint but its pretty intense.     He was compliant with home exercise program.  Response to previous treatment: no adverse effects  Functional change: Pt ambulating with soft knee brace.    Pain: 1/10  Location: right knee     Objective     ROM: X-0-115 with sig over pressure to ext and flexion   Hypomobile fat pad (severe), hypomobile inf/sup patella mob    Jovanni received therapeutic exercises to develop strength, endurance, ROM and flexibility for 25 minutes including  Monster walks <-> rtb at feet 3x  Shuttle DL 4 cords  2x10  Shuttle SL 1.5 cords 4x8     Jovanni participated in dynamic functional therapeutic activities to improve functional performance for 8  minutes, including:  Split squats 3x5 B     Jovanni participated in neuromuscular re-education activities to improve: motor control for 8 minutes. The following activities were included:  SL RDLs 20x B    Jovanni received the following manual therapy techniques: Joint mobilizations were applied to the: R knee for 0 minutes, including:  AP femoral mobs  Fat pad mobs long sitting and in knee flexion  Patella mobs long sitting and in slight knee flexion  IASTM to ant knee in flexion      Home Exercises Provided and Patient Education Provided     Education provided:   - proper gait   - foam rolling to dec ms stiffness perception     Written Home Exercises Provided: Patient instructed to cont prior HEP.Pt   Exercises were reviewed and Jovanni was able to demonstrate them prior to the end of the session.  Jovanni demonstrated good  understanding of the education provided.     See EMR under Media for exercises provided at IE.    Assessment     The pt therex focused on WB strengthening/postural control exercises due to activity the patient has to complete at his work and at home and the addition of dynasplint ext at home. The pt required cueing to improve technique but able to complete exercises without adverse reaction.     Jovanni is progressing well towards his goals.   Pt prognosis is Good.     Pt will continue to benefit from skilled outpatient physical therapy to address the deficits listed in the problem list box on initial evaluation, provide pt/family education and to maximize pt's level of independence in the home and community environment.     Pt's spiritual, cultural and educational needs considered and pt agreeable to plan of care and goals.    Anticipated barriers to physical therapy: work schedule    GOALS: Short Term Goals:  12 weeks  1.Report decreased R knee pain  <  / =  2/10  to increase tolerance for walking/stairs Progressing, not met  2. Increase knee ROM to 5 hyperextension to 135 degrees of knee flexion in order to be able to perform ADLs without difficulty. Progressing, not met  3. Increase strength to 4-/5 MMT grade in R LE  to increase tolerance for ADL and work activities. Progressing, not met  4. Pt to tolerate HEP to improve ROM and independence with ADL's Progressing, not met     Long Term Goals: 24 weeks  1.Report decreased R knee pain < / = 0/10  to increase tolerance for running, biking and swimming. Progressing, not met  2.Patient goal: To be able to return to recreational activities - running, biking, swimming. Progressing, not met  3.Increase strength to >/= 4+/5 in R LE  to increase tolerance for ADL and work activities. Progressing, not met  4. Pt will be able to perform SL squat and lateral bounding without difficulty or compensation in order to return to running. Progressing, not met    Plan     Continue with established Plan of Care towards PT goals, per ACL BTB autograft protocol (Dr. Álvarez).    Brenda Royal, PT, DPT

## 2020-02-18 ENCOUNTER — OFFICE VISIT (OUTPATIENT)
Dept: SPORTS MEDICINE | Facility: CLINIC | Age: 35
End: 2020-02-18
Payer: COMMERCIAL

## 2020-02-18 ENCOUNTER — HOSPITAL ENCOUNTER (EMERGENCY)
Facility: HOSPITAL | Age: 35
Discharge: HOME | End: 2020-02-18
Attending: EMERGENCY MEDICINE
Payer: COMMERCIAL

## 2020-02-18 VITALS
WEIGHT: 165 LBS | DIASTOLIC BLOOD PRESSURE: 73 MMHG | BODY MASS INDEX: 23.62 KG/M2 | RESPIRATION RATE: 16 BRPM | SYSTOLIC BLOOD PRESSURE: 118 MMHG | OXYGEN SATURATION: 98 % | HEART RATE: 89 BPM | TEMPERATURE: 99 F | HEIGHT: 70 IN

## 2020-02-18 VITALS
SYSTOLIC BLOOD PRESSURE: 127 MMHG | HEIGHT: 70 IN | DIASTOLIC BLOOD PRESSURE: 79 MMHG | WEIGHT: 165 LBS | BODY MASS INDEX: 23.62 KG/M2 | HEART RATE: 92 BPM

## 2020-02-18 DIAGNOSIS — Z77.21 EXPOSURE TO BLOOD OR BODY FLUID: Primary | ICD-10-CM

## 2020-02-18 DIAGNOSIS — Z98.890 S/P ACL RECONSTRUCTION: Primary | ICD-10-CM

## 2020-02-18 LAB
HBV CORE IGM SERPL QL IA: NEGATIVE
HBV SURFACE AB SER-ACNC: POSITIVE M[IU]/ML
HIV 1+2 AB+HIV1 P24 AG SERPL QL IA: NEGATIVE
RPR SER QL: NORMAL

## 2020-02-18 PROCEDURE — 99024 POSTOP FOLLOW-UP VISIT: CPT | Mod: S$GLB,,, | Performed by: ORTHOPAEDIC SURGERY

## 2020-02-18 PROCEDURE — 86592 SYPHILIS TEST NON-TREP QUAL: CPT

## 2020-02-18 PROCEDURE — 99999 PR PBB SHADOW E&M-EST. PATIENT-LVL III: CPT | Mod: PBBFAC,,, | Performed by: ORTHOPAEDIC SURGERY

## 2020-02-18 PROCEDURE — 86705 HEP B CORE ANTIBODY IGM: CPT

## 2020-02-18 PROCEDURE — 86706 HEP B SURFACE ANTIBODY: CPT

## 2020-02-18 PROCEDURE — 99284 EMERGENCY DEPT VISIT MOD MDM: CPT | Mod: ,,, | Performed by: EMERGENCY MEDICINE

## 2020-02-18 PROCEDURE — 99024 PR POST-OP FOLLOW-UP VISIT: ICD-10-PCS | Mod: S$GLB,,, | Performed by: ORTHOPAEDIC SURGERY

## 2020-02-18 PROCEDURE — 25000003 PHARM REV CODE 250: Performed by: EMERGENCY MEDICINE

## 2020-02-18 PROCEDURE — 99284 PR EMERGENCY DEPT VISIT,LEVEL IV: ICD-10-PCS | Mod: ,,, | Performed by: EMERGENCY MEDICINE

## 2020-02-18 PROCEDURE — 99999 PR PBB SHADOW E&M-EST. PATIENT-LVL III: ICD-10-PCS | Mod: PBBFAC,,, | Performed by: ORTHOPAEDIC SURGERY

## 2020-02-18 PROCEDURE — 99284 EMERGENCY DEPT VISIT MOD MDM: CPT

## 2020-02-18 PROCEDURE — 86703 HIV-1/HIV-2 1 RESULT ANTBDY: CPT

## 2020-02-18 RX ORDER — EMTRICITABINE AND TENOFOVIR DISOPROXIL FUMARATE 200; 300 MG/1; MG/1
1 TABLET, FILM COATED ORAL DAILY
Qty: 3 TABLET | Refills: 0 | Status: SHIPPED | OUTPATIENT
Start: 2020-02-18 | End: 2021-04-20

## 2020-02-18 RX ORDER — TENOFOVIR DISOPROXIL FUMARATE 300 MG/1
300 TABLET, FILM COATED ORAL
Status: DISCONTINUED | OUTPATIENT
Start: 2020-02-18 | End: 2020-02-18

## 2020-02-18 RX ORDER — EMTRICITABINE 200 MG/1
200 CAPSULE ORAL
Status: COMPLETED | OUTPATIENT
Start: 2020-02-18 | End: 2020-02-18

## 2020-02-18 RX ORDER — EMTRICITABINE 200 MG/1
200 CAPSULE ORAL
Status: DISCONTINUED | OUTPATIENT
Start: 2020-02-18 | End: 2020-02-18

## 2020-02-18 RX ORDER — TENOFOVIR DISOPROXIL FUMARATE 300 MG/1
300 TABLET, FILM COATED ORAL
Status: COMPLETED | OUTPATIENT
Start: 2020-02-18 | End: 2020-02-18

## 2020-02-18 RX ADMIN — TENOFOVIR DISPROXIL FUMARATE 300 MG: 300 TABLET ORAL at 04:02

## 2020-02-18 RX ADMIN — RALTEGRAVIR 400 MG: 400 TABLET, FILM COATED ORAL at 04:02

## 2020-02-18 RX ADMIN — EMTRICITABINE 200 MG: 200 CAPSULE ORAL at 04:02

## 2020-02-18 NOTE — DISCHARGE INSTRUCTIONS
Please follow up with your PCP for reevaluation within 1 week.  Please go to [facility] today as soon as it opens.  Return to ED for worsening symptoms including chest pain, difficulty breathing, inability to eat or drink, fever, or chills.

## 2020-02-18 NOTE — ED TRIAGE NOTES
Pt presents to the ED c/o of small scalpel blade cut to right middle finger. 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 Pt has no noted acute bleeding to right middle finger. Rapid HIV panel not confirmed to have been completed on admitted pt.

## 2020-02-18 NOTE — PROGRESS NOTES
S:Jovanni Kerr presents for post-operative evaluation.     DATE OF PROCEDURE: 11/18/2019   PROCEDURES PERFORMED:   Right knee arthroscopic bone patellar tendon bone autograft ACL reconstruction   Right knee arthroscopic lateral plica band resection   Right knee arthroscopic partial lateral release     Jovanni Kerr returns today 13 wk s/p the above mentioned procedure. Making progress.  Reports some stiffness in terminal flexion and extension. Denies significant pain.  Remains very active on his feet on a daily basis.  Reports that he does not think he is progressing as much as he should it with regards to his extension.  He reports that he has been using a dynamic extension splint since his last visit but in general it sounds like his therapy participation on his own has been limited.  I have discussed this with his physical therapist.    O: RLE - The incisions are well healed.  Mild anterior knee swelling with some thickening of the fat pad and soft tissue area infrapatellar.  Reasonably good patellar mobility.  Central patellar tracking. No significant pain or unusual tenderness. Passive extension to neutral. 5 degrees off from CL side. Flexion to 105 degrees with some mild pain anteriorly. Negative Lachman. NVI distally. Quad activates but with continued atrophy    Radiographs 12/16: S/p ACLR. Tunnels in an acceptable position. Noted bone plug positioning and small femoral screw divergence from the plug. - no changes from prior films.  CT R Knee 12/19: No fracture or dislocation.  Specifically no patellar fracture.     A/P:  Right knee stiffness postoperatively  -Emphasized importance of doing prone hanging exercises which has not been incorporated into his program as of yet.  I have discussed this with his physical therapist.  If he fails to come a continued progress, I've explained that he may need arthroscopic lysis of adhesions with manipulation.  I certainly understand the difficulties with regards to  rehab given his work status and demands.  -F/U 1 month for close observation.  He is in agreement with the plan and has my cell number should there be any issues.

## 2020-02-18 NOTE — ED PROVIDER NOTES
Encounter Date: 2/18/2020       History     Chief Complaint   Patient presents with    Body Fluid Exposure     Pt employee of [employer] and dirty scalpel with right middle finger.      HPI   34M with no PMHx who presents following body fluid exposure.  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  He washed the wound out and presented to the ED.  House supervisor was notified.  Patient is requesting HIV prophylaxis in the ED with a prescription for home.          Review of patient's allergies indicates:  No Known Allergies  History reviewed. No pertinent past medical history.  Past Surgical History:   Procedure Laterality Date    KNEE ARTHROSCOPY W/ ACL RECONSTRUCTION Right 11/18/2019    Procedure: RECONSTRUCTION, KNEE, ACL, ARTHROSCOPIC;  Surgeon: JOSEF Álvarez MD;  Location: [facility] OR;  Service: Orthopedics;  Laterality: Right;  REGIONAL W/CATHETER, ADDUCTOR     Family History   Problem Relation Age of Onset    Hypertension Mother     Stroke Mother 60    Hypothyroidism Mother     Hypertension Father     Tremor Father     Polycystic kidney disease Sister         transplant as kid     Social History     Tobacco Use    Smoking status: Never Smoker    Smokeless tobacco: Never Used   Substance Use Topics    Alcohol use: Yes     Frequency: 2-4 times a month     Drinks per session: 1 or 2    Drug use: Never     Review of Systems   Constitutional: Negative for fever.   HENT: Negative for sore throat.    Respiratory: Negative for shortness of breath.    Cardiovascular: Negative for chest pain.   Gastrointestinal: Negative for nausea.   Genitourinary: Negative for dysuria.   Musculoskeletal: Negative for back pain.   Skin: Positive for wound (right middle finger dorsum). Negative for rash.   Neurological: Negative for weakness.   Hematological: Does not bruise/bleed easily.       Physical Exam     Initial Vitals [02/18/20 0250]   BP Pulse Resp Temp SpO2   118/73 89 16 98.5 °F (36.9 °C) 98  %      MAP       --         Physical Exam    Constitutional: He appears well-developed and well-nourished.   HENT:   Head: Normocephalic and atraumatic.   Mouth/Throat: Oropharynx is clear and moist. No oropharyngeal exudate.   Eyes: EOM are normal. Pupils are equal, round, and reactive to light.   Neck: Normal range of motion. Neck supple.   Cardiovascular: Normal rate and regular rhythm. Exam reveals no gallop and no friction rub.    No murmur heard.  Pulmonary/Chest: No respiratory distress. He has no wheezes. He has no rhonchi. He has no rales.   Abdominal: Soft. He exhibits no distension. There is no tenderness.   Musculoskeletal: Normal range of motion. He exhibits no edema.   Neurological: He is alert and oriented to person, place, and time. No cranial nerve deficit.   Skin: Skin is warm and dry.   Small hemostatic superficial laceration overlying right third DIP on dorsum         ED Course   Procedures  Labs Reviewed   RPR   HIV 1 / 2 ANTIBODY   HEPATITIS B SURFACE ANTIBODY   HEPATITIS B CORE ANTIBODY, IGM          Imaging Results    None                APC / Resident Notes:   34M with no PMHx who presents following occupational exposure.  Laceration is superficial and does not require closure.  House supervisor notified.  Labwork drawn and sent.  First dose of PEP given in ED.  Patient discharged home with instructions to follow up with PCP and employee health ASAP.    Daniela Alfredo  [employer] PGY-1  Emergency Medicine  4:40 AM 2/18/2020                               Clinical Impression:       ICD-10-CM ICD-9-CM   1. Exposure to blood or body fluid Z77.21 V15.85                             Daniela Alfredo MD  Resident  02/18/20 [id number]

## 2020-02-19 ENCOUNTER — CLINICAL SUPPORT (OUTPATIENT)
Dept: REHABILITATION | Facility: HOSPITAL | Age: 35
End: 2020-02-19
Payer: COMMERCIAL

## 2020-02-19 DIAGNOSIS — M25.561 ACUTE PAIN OF RIGHT KNEE: ICD-10-CM

## 2020-02-19 PROCEDURE — 97140 MANUAL THERAPY 1/> REGIONS: CPT

## 2020-02-19 PROCEDURE — 97110 THERAPEUTIC EXERCISES: CPT

## 2020-02-19 NOTE — PROGRESS NOTES
Physical Therapy Daily Treatment Note     Name: Jovanni Kerr  Clinic Number: [id number]    Therapy Diagnosis:   Encounter Diagnosis   Name Primary?    Acute pain of right knee      Physician: JOSEF Álvarez MD    Visit Date: 2/19/2020  Physician Orders: PT Eval and Treat   Medical Diagnosis from Referral: S83.511A (ICD-10-CM) - Rupture of anterior cruciate ligament of right knee, initial encounter  Evaluation Date: 11/19/2019  Authorization Period Expiration: 12/31/2020  Plan of Care Expiration: 5/5/2020  Visit # / Visits authorized: 7/ 30  Visit # total: 17     Time In: 1625  Time Out: 1700  Total Billable Time: 35 minutes     Precautions: Standard, Patient will weight bear as tolerates with the brace locked in extension. Range of motion may be full. Plan to follow an ACL autograft rehab protocol. Initial goals include maintenance of full knee extension, regaining flexion, swelling control, and quadriceps activation exercises. May discontinue the brace was the quad is firing well. Expected release for sports no earlier than 8-9 months following Sport Cord testing.        PROCEDURES PERFORMED on 11/18/19:   Right knee arthroscopic bone patellar tendon bone autograft ACL reconstruction (CPT 37226)  Right knee arthroscopic lateral plica band resection (CPT 68786)  Right knee arthroscopic partial lateral release (CPT 64849)    Subjective     Pt reports: his knee is very stiff and sore today in the anterior portion. Notes that the dynasplint has been hurting his hip a little bit. Pt arrived 25m late for his appointment today.     He was compliant with home exercise program.  Response to previous treatment: no adverse effects  Functional change: Pt ambulating with soft knee brace.    Pain: 1/10  Location: right knee     Objective     ROM: X-0-115 with sig over pressure to ext and flexion   Hypomobile fat pad (severe), hypomobile inf/sup patella mob    Jovanni received therapeutic exercises to develop strength,  endurance, ROM and flexibility for 5 minutes including  Prone hang 5# 5m    Jovanni participated in dynamic functional therapeutic activities to improve functional performance for 0  minutes, including:      Jovanni participated in neuromuscular re-education activities to improve: motor control for 15 minutes. The following activities were included:  LAQ 20x 5s hold at top  Prone hold relax Quad stretch 10s holds 3x 2 rounds     Jovanni received the following manual therapy techniques: Joint mobilizations were applied to the: R knee for 15 minutes, including:  AP femoral mobs  Fat pad mobs long sitting and in knee flexion  Patella mobs long sitting and in slight knee flexion      Home Exercises Provided and Patient Education Provided     Education provided:   - proper gait   - foam rolling to dec ms stiffness perception     Written Home Exercises Provided: Patient instructed to cont prior HEP.Pt   Exercises were reviewed and Jovanni was able to demonstrate them prior to the end of the session.  Jovanni demonstrated good  understanding of the education provided.     See EMR under Media for exercises provided at IE.    Assessment     The pt with cont hypomobility of an structures leading to cont pain with end range flexion and extension. Cont to focus on mobiltiy through manual and therex.     Jovanni is progressing well towards his goals.   Pt prognosis is Good.     Pt will continue to benefit from skilled outpatient physical therapy to address the deficits listed in the problem list box on initial evaluation, provide pt/family education and to maximize pt's level of independence in the home and community environment.     Pt's spiritual, cultural and educational needs considered and pt agreeable to plan of care and goals.    Anticipated barriers to physical therapy: work schedule    GOALS: Short Term Goals:  12 weeks  1.Report decreased R knee pain  < / =  2/10  to increase tolerance for walking/stairs Progressing,  not met  2. Increase knee ROM to 5 hyperextension to 135 degrees of knee flexion in order to be able to perform ADLs without difficulty. Progressing, not met  3. Increase strength to 4-/5 MMT grade in R LE  to increase tolerance for ADL and work activities. Progressing, not met  4. Pt to tolerate HEP to improve ROM and independence with ADL's Progressing, not met     Long Term Goals: 24 weeks  1.Report decreased R knee pain < / = 0/10  to increase tolerance for running, biking and swimming. Progressing, not met  2.Patient goal: To be able to return to recreational activities - running, biking, swimming. Progressing, not met  3.Increase strength to >/= 4+/5 in R LE  to increase tolerance for ADL and work activities. Progressing, not met  4. Pt will be able to perform SL squat and lateral bounding without difficulty or compensation in order to return to running. Progressing, not met    Plan     Continue with established Plan of Care towards PT goals, per ACL BTB autograft protocol (Dr. Álvarez).    Brenda Royal, PT, DPT

## 2020-02-21 ENCOUNTER — CLINICAL SUPPORT (OUTPATIENT)
Dept: REHABILITATION | Facility: HOSPITAL | Age: 35
End: 2020-02-21
Payer: COMMERCIAL

## 2020-02-21 DIAGNOSIS — Z98.890 S/P ACL RECONSTRUCTION: ICD-10-CM

## 2020-02-21 DIAGNOSIS — M25.561 ACUTE PAIN OF RIGHT KNEE: ICD-10-CM

## 2020-02-21 PROCEDURE — 97110 THERAPEUTIC EXERCISES: CPT

## 2020-02-21 PROCEDURE — 97140 MANUAL THERAPY 1/> REGIONS: CPT

## 2020-02-21 PROCEDURE — 97112 NEUROMUSCULAR REEDUCATION: CPT

## 2020-02-21 NOTE — PROGRESS NOTES
Physical Therapy Daily Treatment Note     Name: Jovanni Kerr  Clinic Number: [id number]    Therapy Diagnosis:   Encounter Diagnoses   Name Primary?    S/P ACL reconstruction     Acute pain of right knee      Physician: JOSEF Álvarez MD    Visit Date: 2/21/2020  Physician Orders: PT Eval and Treat   Medical Diagnosis from Referral: S83.511A (ICD-10-CM) - Rupture of anterior cruciate ligament of right knee, initial encounter  Evaluation Date: 11/19/2019  Authorization Period Expiration: 12/31/2020  Plan of Care Expiration: 5/5/2020  Visit # / Visits authorized: 8/ 30  Visit # total: 18     Time In: 1310  Time Out: 1405  Total Billable Time: 40 minutes     Precautions: Standard, Patient will weight bear as tolerates with the brace locked in extension. Range of motion may be full. Plan to follow an ACL autograft rehab protocol. Initial goals include maintenance of full knee extension, regaining flexion, swelling control, and quadriceps activation exercises. May discontinue the brace was the quad is firing well. Expected release for sports no earlier than 8-9 months following Sport Cord testing.        PROCEDURES PERFORMED on 11/18/19:   Right knee arthroscopic bone patellar tendon bone autograft ACL reconstruction (CPT 87743)  Right knee arthroscopic lateral plica band resection (CPT 94943)  Right knee arthroscopic partial lateral release (CPT 55006)    Subjective     Pt reports: that his knee is less sore compared to last treatment session. Cont to feel painful if standing for too long in surgery.     He was compliant with home exercise program.  Response to previous treatment: no adverse effects  Functional change: Pt ambulating with soft knee brace.    Pain: 1/10  Location: right knee     Objective     ROM: X-0-115 with sig over pressure to ext and flexion   Hypomobile fat pad (severe), hypomobile inf/sup patella mob    Jovanni received therapeutic exercises to develop strength, endurance, ROM and  flexibility for 30 minutes including  Shuttle SL press 2 cords 6x5   TKE with Squats 4x10 otb     Jovanni participated in dynamic functional therapeutic activities to improve functional performance for 0  minutes, including:      Jovanni participated in neuromuscular re-education activities to improve: motor control for 15 minutes. The following activities were included:  LAQ 20x 5s hold at top  SL RDL to cone 40x    Jovanni received the following manual therapy techniques: Joint mobilizations were applied to the: R knee for 15 minutes, including:  AP femoral mobs  Fat pad mobs long sitting and in knee flexion  Patella mobs long sitting and in slight knee flexion  Supine knee distraction grade I-II   Sup patella mobs/inf patella mobs grade III- IV      Home Exercises Provided and Patient Education Provided     Education provided:   - proper gait   - foam rolling to dec ms stiffness perception     Written Home Exercises Provided: Patient instructed to cont prior HEP.Pt   Exercises were reviewed and Jovanni was able to demonstrate them prior to the end of the session.  Jovanni demonstrated good  understanding of the education provided.     See EMR under Media for exercises provided at IE.    Assessment     Cont to have minimal improvement in ROM and pain following manual treatment. Focused on LE strength and postural control- pt able to complete therex with reported knee soreness following.     Jovanni is progressing well towards his goals.   Pt prognosis is Good.     Pt will continue to benefit from skilled outpatient physical therapy to address the deficits listed in the problem list box on initial evaluation, provide pt/family education and to maximize pt's level of independence in the home and community environment.     Pt's spiritual, cultural and educational needs considered and pt agreeable to plan of care and goals.    Anticipated barriers to physical therapy: work schedule    GOALS: Short Term Goals:  12  weeks  1.Report decreased R knee pain  < / =  2/10  to increase tolerance for walking/stairs Progressing, not met  2. Increase knee ROM to 5 hyperextension to 135 degrees of knee flexion in order to be able to perform ADLs without difficulty. Progressing, not met  3. Increase strength to 4-/5 MMT grade in R LE  to increase tolerance for ADL and work activities. Progressing, not met  4. Pt to tolerate HEP to improve ROM and independence with ADL's Progressing, not met     Long Term Goals: 24 weeks  1.Report decreased R knee pain < / = 0/10  to increase tolerance for running, biking and swimming. Progressing, not met  2.Patient goal: To be able to return to recreational activities - running, biking, swimming. Progressing, not met  3.Increase strength to >/= 4+/5 in R LE  to increase tolerance for ADL and work activities. Progressing, not met  4. Pt will be able to perform SL squat and lateral bounding without difficulty or compensation in order to return to running. Progressing, not met    Plan     Continue with established Plan of Care towards PT goals, per ACL BTB autograft protocol (Dr. Álvarez).    Brenda Royal, PT, DPT

## 2020-02-24 ENCOUNTER — CLINICAL SUPPORT (OUTPATIENT)
Dept: REHABILITATION | Facility: HOSPITAL | Age: 35
End: 2020-02-24
Payer: COMMERCIAL

## 2020-02-24 DIAGNOSIS — M25.561 ACUTE PAIN OF RIGHT KNEE: ICD-10-CM

## 2020-02-24 PROCEDURE — 97110 THERAPEUTIC EXERCISES: CPT

## 2020-02-24 PROCEDURE — 97140 MANUAL THERAPY 1/> REGIONS: CPT

## 2020-02-24 PROCEDURE — 97112 NEUROMUSCULAR REEDUCATION: CPT

## 2020-02-27 ENCOUNTER — CLINICAL SUPPORT (OUTPATIENT)
Dept: REHABILITATION | Facility: HOSPITAL | Age: 35
End: 2020-02-27
Payer: COMMERCIAL

## 2020-02-27 DIAGNOSIS — M25.561 ACUTE PAIN OF RIGHT KNEE: ICD-10-CM

## 2020-02-27 PROCEDURE — 97140 MANUAL THERAPY 1/> REGIONS: CPT

## 2020-02-27 PROCEDURE — 97110 THERAPEUTIC EXERCISES: CPT

## 2020-02-27 NOTE — PROGRESS NOTES
Physical Therapy Daily Treatment Note     Name: Jovanni Kerr  Clinic Number: [id number]    Therapy Diagnosis:   Encounter Diagnosis   Name Primary?    Acute pain of right knee      Physician: JOSEF Álvarez MD    Visit Date: 2/27/2020  Physician Orders: PT Eval and Treat   Medical Diagnosis from Referral: S83.511A (ICD-10-CM) - Rupture of anterior cruciate ligament of right knee, initial encounter  Evaluation Date: 11/19/2019  Authorization Period Expiration: 12/31/2020  Plan of Care Expiration: 5/5/2020  Visit # / Visits authorized: 9/ 30  Visit # total: 19     Time In: 1720  Time Out: 1800  Total Billable Time: 40 minutes     Precautions: Standard, Patient will weight bear as tolerates with the brace locked in extension. Range of motion may be full. Plan to follow an ACL autograft rehab protocol. Initial goals include maintenance of full knee extension, regaining flexion, swelling control, and quadriceps activation exercises. May discontinue the brace was the quad is firing well. Expected release for sports no earlier than 8-9 months following Sport Cord testing.        PROCEDURES PERFORMED on 11/18/19:   Right knee arthroscopic bone patellar tendon bone autograft ACL reconstruction (CPT 00228)  Right knee arthroscopic lateral plica band resection (CPT 62296)  Right knee arthroscopic partial lateral release (CPT 78942)    Subjective     Pt reports: he feels his knee is getting better, has less pain anteriorly and feels like his quad control is getting better.     He was compliant with home exercise program.  Response to previous treatment: no adverse effects  Functional change: Pt ambulating with soft knee brace.    Pain: 1/10  Location: right knee     Objective     2/27/2020  ROM: X-0-115 with mild to mod over pressure to ext and flexion   Hypomobile fat pad (moderate), mod hypomobile inf/sup patella mob    Jovanni received therapeutic exercises to develop strength, endurance, ROM and flexibility for 30  minutes including  Prone hang 5# at ankle x10m   SAQ 10x 10s holds   LAQ 10x10s holds   Shutle SL 2.5 cords 5x5 R    Jovanni participated in dynamic functional therapeutic activities to improve functional performance for 0  minutes, including:      Jovanni participated in neuromuscular re-education activities to improve: motor control for 00 minutes. The following activities were included:      Jovanni received the following manual therapy techniques: Joint mobilizations were applied to the: R knee for 10 minutes, including:  AP femoral mobs  Fat pad mobs long sitting and in knee flexion  Patella mobs long sitting and in slight knee flexion  Supine knee distraction grade I-II   Sup patella mobs/inf patella mobs grade III- IV      Home Exercises Provided and Patient Education Provided     Education provided:   - proper gait   - foam rolling to dec ms stiffness perception     Written Home Exercises Provided: Patient instructed to cont prior HEP.Pt   Exercises were reviewed and Jovanni was able to demonstrate them prior to the end of the session.  Jovanni demonstrated good  understanding of the education provided.     See EMR under Media for exercises provided at IE.    Assessment     The pt with improvement in mobility of anterior knee- patella tendon, sup patella mobs, fat pad. The pt able to demo improvement in knee extension tolerance and improvement in quad control. advised to cont to complete HEP at home.     Extender used throughout treatment: Kindra Fontenot ATC, LAT    Jovanni is progressing well towards his goals.   Pt prognosis is Good.     Pt will continue to benefit from skilled outpatient physical therapy to address the deficits listed in the problem list box on initial evaluation, provide pt/family education and to maximize pt's level of independence in the home and community environment.     Pt's spiritual, cultural and educational needs considered and pt agreeable to plan of care and  goals.    Anticipated barriers to physical therapy: work schedule    GOALS: Short Term Goals:  12 weeks  1.Report decreased R knee pain  < / =  2/10  to increase tolerance for walking/stairs Progressing, not met  2. Increase knee ROM to 5 hyperextension to 135 degrees of knee flexion in order to be able to perform ADLs without difficulty. Progressing, not met  3. Increase strength to 4-/5 MMT grade in R LE  to increase tolerance for ADL and work activities. Progressing, not met  4. Pt to tolerate HEP to improve ROM and independence with ADL's Progressing, not met     Long Term Goals: 24 weeks  1.Report decreased R knee pain < / = 0/10  to increase tolerance for running, biking and swimming. Progressing, not met  2.Patient goal: To be able to return to recreational activities - running, biking, swimming. Progressing, not met  3.Increase strength to >/= 4+/5 in R LE  to increase tolerance for ADL and work activities. Progressing, not met  4. Pt will be able to perform SL squat and lateral bounding without difficulty or compensation in order to return to running. Progressing, not met    Plan     Continue with established Plan of Care towards PT goals, per ACL BTB autograft protocol (Dr. Álvarez).    Brenda Royal, PT, DPT

## 2020-03-05 ENCOUNTER — CLINICAL SUPPORT (OUTPATIENT)
Dept: REHABILITATION | Facility: HOSPITAL | Age: 35
End: 2020-03-05
Payer: COMMERCIAL

## 2020-03-05 ENCOUNTER — OFFICE VISIT (OUTPATIENT)
Dept: SPORTS MEDICINE | Facility: CLINIC | Age: 35
End: 2020-03-05
Payer: COMMERCIAL

## 2020-03-05 DIAGNOSIS — M25.561 ACUTE PAIN OF RIGHT KNEE: ICD-10-CM

## 2020-03-05 DIAGNOSIS — M25.461 EFFUSION OF RIGHT KNEE: ICD-10-CM

## 2020-03-05 DIAGNOSIS — M25.561 RIGHT KNEE PAIN, UNSPECIFIED CHRONICITY: Primary | ICD-10-CM

## 2020-03-05 PROCEDURE — 20610 DRAIN/INJ JOINT/BURSA W/O US: CPT | Mod: RT,S$GLB,, | Performed by: ORTHOPAEDIC SURGERY

## 2020-03-05 PROCEDURE — 99213 PR OFFICE/OUTPT VISIT, EST, LEVL III, 20-29 MIN: ICD-10-PCS | Mod: 25,S$GLB,, | Performed by: ORTHOPAEDIC SURGERY

## 2020-03-05 PROCEDURE — 97110 THERAPEUTIC EXERCISES: CPT

## 2020-03-05 PROCEDURE — 99999 PR PBB SHADOW E&M-EST. PATIENT-LVL I: CPT | Mod: PBBFAC,,, | Performed by: ORTHOPAEDIC SURGERY

## 2020-03-05 PROCEDURE — 20610 LARGE JOINT ASPIRATION/INJECTION: R KNEE: ICD-10-PCS | Mod: RT,S$GLB,, | Performed by: ORTHOPAEDIC SURGERY

## 2020-03-05 PROCEDURE — 99213 OFFICE O/P EST LOW 20 MIN: CPT | Mod: 25,S$GLB,, | Performed by: ORTHOPAEDIC SURGERY

## 2020-03-05 PROCEDURE — 99999 PR PBB SHADOW E&M-EST. PATIENT-LVL I: ICD-10-PCS | Mod: PBBFAC,,, | Performed by: ORTHOPAEDIC SURGERY

## 2020-03-05 PROCEDURE — 97140 MANUAL THERAPY 1/> REGIONS: CPT

## 2020-03-06 NOTE — PROGRESS NOTES
Physical Therapy Daily Treatment Note     Name: Jovanni Kerr  Clinic Number: [id number]    Therapy Diagnosis:   Encounter Diagnosis   Name Primary?    Acute pain of right knee      Physician: JOSEF Álvarez MD    Visit Date: 3/5/2020  Physician Orders: PT Eval and Treat   Medical Diagnosis from Referral: S83.511A (ICD-10-CM) - Rupture of anterior cruciate ligament of right knee, initial encounter  Evaluation Date: 11/19/2019  Authorization Period Expiration: 12/31/2020  Plan of Care Expiration: 5/5/2020  Visit # / Visits authorized: 9/ 30  Visit # total: 19     Time In: 1730  Time Out: 1800  Total Billable Time: 20 minutes     Precautions: Standard, Patient will weight bear as tolerates with the brace locked in extension. Range of motion may be full. Plan to follow an ACL autograft rehab protocol. Initial goals include maintenance of full knee extension, regaining flexion, swelling control, and quadriceps activation exercises. May discontinue the brace was the quad is firing well. Expected release for sports no earlier than 8-9 months following Sport Cord testing.        PROCEDURES PERFORMED on 11/18/19:   Right knee arthroscopic bone patellar tendon bone autograft ACL reconstruction (CPT 21319)  Right knee arthroscopic lateral plica band resection (CPT 43335)  Right knee arthroscopic partial lateral release (CPT 45621)    Subjective     Pt reports: that his knee is still stiff he was in surgery today from 8AM-4PM. The pt arrived 30m late to his appointment.     He was compliant with home exercise program.  Response to previous treatment: no adverse effects  Functional change: Pt ambulating with soft knee brace.    Pain: 1/10  Location: right knee     Objective     2/27/2020  ROM: X-0-115 with mild to mod over pressure to ext and flexion   Hypomobile fat pad (severe), mod hypomobile inf/sup patella mob    Jovanni received therapeutic exercises to develop strength, endurance, ROM and flexibility for 20  minutes including  Shuttle DL 4 cords 20x   Single Leg SL 4x8 2 cords   LAQ 2.5# 5x5 with iso hold at 45 deg at last rep.     Jovanni participated in dynamic functional therapeutic activities to improve functional performance for 0  minutes, including:      Jovanni participated in neuromuscular re-education activities to improve: motor control for 00 minutes. The following activities were included:      Jovanni received the following manual therapy techniques: Joint mobilizations were applied to the: R knee for 10 minutes, including:  AP femoral mobs  Fat pad mobs long sitting and in knee flexion  Patella mobs long sitting and in slight knee flexion  Supine knee distraction grade I-II   Sup patella mobs/inf patella mobs grade III- IV      Home Exercises Provided and Patient Education Provided     Education provided:   - proper gait   - foam rolling to dec ms stiffness perception     Written Home Exercises Provided: Patient instructed to cont prior HEP.Pt   Exercises were reviewed and Jovanni was able to demonstrate them prior to the end of the session.  Jovanni demonstrated good  understanding of the education provided.     See EMR under Media for exercises provided at IE.    Assessment     The pt has consistently been arriving late to his appointments making it difficult to progress his treatment. The pt with cont demonstration of poor terminal knee extension due to anterior structure hypomobility which has been a sig limiting facor in the patient's treatment. It seems that on the days the patient has to stand for long periods of time, the pt's mobility is sig affected. Discussed with the patient the importance of coming to his appointment on time and consistently to improve outcomes.     Extender used throughout treatment: Kindra Fontenot ATC, LAT    Jovanni is progressing well towards his goals.   Pt prognosis is Good.     Pt will continue to benefit from skilled outpatient physical therapy to address the  deficits listed in the problem list box on initial evaluation, provide pt/family education and to maximize pt's level of independence in the home and community environment.     Pt's spiritual, cultural and educational needs considered and pt agreeable to plan of care and goals.    Anticipated barriers to physical therapy: work schedule    GOALS: Short Term Goals:  12 weeks  1.Report decreased R knee pain  < / =  2/10  to increase tolerance for walking/stairs Progressing, not met  2. Increase knee ROM to 5 hyperextension to 135 degrees of knee flexion in order to be able to perform ADLs without difficulty. Progressing, not met  3. Increase strength to 4-/5 MMT grade in R LE  to increase tolerance for ADL and work activities. Progressing, not met  4. Pt to tolerate HEP to improve ROM and independence with ADL's Progressing, not met     Long Term Goals: 24 weeks  1.Report decreased R knee pain < / = 0/10  to increase tolerance for running, biking and swimming. Progressing, not met  2.Patient goal: To be able to return to recreational activities - running, biking, swimming. Progressing, not met  3.Increase strength to >/= 4+/5 in R LE  to increase tolerance for ADL and work activities. Progressing, not met  4. Pt will be able to perform SL squat and lateral bounding without difficulty or compensation in order to return to running. Progressing, not met    Plan     Continue with established Plan of Care towards PT goals, per ACL BTB autograft protocol (Dr. Álvarez).    Brenda Royal, PT, DPT

## 2020-03-08 RX ORDER — CHLORZOXAZONE 500 MG/1
TABLET ORAL
Qty: 60 TABLET | Refills: 0 | Status: ON HOLD | OUTPATIENT
Start: 2020-03-08 | End: 2020-10-09 | Stop reason: HOSPADM

## 2020-03-09 NOTE — PROCEDURES
Large Joint Aspiration/Injection: R knee  Performed by: JOSEF Álvarez MD  Authorized by: JOSEF Álvarez MD  Date/Time: 3/5/2020 4:15 PM    Consent Done?:  Yes (Verbal)  Indications:  joint swelling  Timeout: Immediately prior to procedure a time out was called to verify the correct patient, procedure, equipment, support staff and site/side marked as required.  Prep:Patient was prepped and draped in the usual sterile fashion.  Procedure site marked: Yes     Details:   Needle size: 22 G   Approach: superior  Location:  Knee  Site:  R knee    Aspirate:  20 mL clear and serous  Patient tolerance:  patient tolerated the procedure well with no immediate complications

## 2020-03-09 NOTE — PROGRESS NOTES
S:Jovanni Kerr presents for post-operative evaluation.     DATE OF PROCEDURE: 11/18/2019   PROCEDURES PERFORMED:   Right knee arthroscopic bone patellar tendon bone autograft ACL reconstruction   Right knee arthroscopic lateral plica band resection   Right knee arthroscopic partial lateral release     Jovanni Kerr returns to clinic today as a walk-in.  I saw him in physical therapy and noted that he did have an effusion. He has struggled with his extension and flexion.  The postoperative course has been complicated by intermittent and inconsistent therapy participation initially.  The patient spends and extended period of time on his feet daily in the hospital.  He does report slow and steady progress.  I have brought him in for a knee aspiration.  He does have a dynamic extension splint at home to assist with the extension issue.    On exam he has full passive extension with over pressure but he lacks 5° of physiologic hyper compared to the other side. Flexion to 125° with pain at terminal range.  Negative Lachman.  Stable to varus and valgus stress. 1+ effusion. Improved quadriceps bulk and tone with continued weakness.    A/P:  Right knee aspiration was performed.  This yielded 20 cc of clear serosanguineous fluid. Plan to continue physical therapy.  All questions answered.  A muscle relaxant was prescribed.  Return to clinic as scheduled.

## 2020-03-10 ENCOUNTER — CLINICAL SUPPORT (OUTPATIENT)
Dept: REHABILITATION | Facility: HOSPITAL | Age: 35
End: 2020-03-10
Payer: COMMERCIAL

## 2020-03-10 DIAGNOSIS — M25.561 ACUTE PAIN OF RIGHT KNEE: ICD-10-CM

## 2020-03-10 PROCEDURE — 97110 THERAPEUTIC EXERCISES: CPT

## 2020-03-10 PROCEDURE — 97140 MANUAL THERAPY 1/> REGIONS: CPT

## 2020-03-10 NOTE — PROGRESS NOTES
Physical Therapy Daily Treatment Note     Name: Jovanni Kerr  Clinic Number: [id number]    Therapy Diagnosis:   Encounter Diagnosis   Name Primary?    Acute pain of right knee      Physician: JOSEF Álvarez MD    Visit Date: 3/10/2020  Physician Orders: PT Eval and Treat   Medical Diagnosis from Referral: S83.511A (ICD-10-CM) - Rupture of anterior cruciate ligament of right knee, initial encounter  Evaluation Date: 11/19/2019  Authorization Period Expiration: 12/31/2020  Plan of Care Expiration: 5/5/2020  Visit # / Visits authorized: 10/ 30  Visit # total: 20     Time In: 1715  Time Out: 1830  Total Billable Time: 30 minutes     Precautions: Standard, Patient will weight bear as tolerates with the brace locked in extension. Range of motion may be full. Plan to follow an ACL autograft rehab protocol. Initial goals include maintenance of full knee extension, regaining flexion, swelling control, and quadriceps activation exercises. May discontinue the brace was the quad is firing well. Expected release for sports no earlier than 8-9 months following Sport Cord testing.        PROCEDURES PERFORMED on 11/18/19:   Right knee arthroscopic bone patellar tendon bone autograft ACL reconstruction (CPT 11286)  Right knee arthroscopic lateral plica band resection (CPT 29935)  Right knee arthroscopic partial lateral release (CPT 25225)    Subjective     Pt reports:he had a 12 hr surgery yesterday and a 6 hour surgery today, his knee feels tight.     He was compliant with home exercise program.  Response to previous treatment: no adverse effects  Functional change: Pt ambulating with soft knee brace.    Pain: 1/10  Location: right knee     Objective     2/27/2020  ROM: X-0-115 with mild to mod over pressure to ext and flexion   Hypomobile fat pad (severe), mod hypomobile inf/sup patella mob    Jovanni received therapeutic exercises to develop strength, endurance, ROM and flexibility for 30 minutes including  QS holds 5x10s  "  Prone hangs 6m 5#   QS Holds 5x10s     Shuttle DL 4 cords 2x20   Single Leg SL 4x8 2 cords       Jovanni participated in dynamic functional therapeutic activities to improve functional performance for 30  minutes, includin Rds:   Plank 60s   Step ups 12x 12"   Lateral walk btb at knees <->     Jovanni participated in neuromuscular re-education activities to improve: motor control for 10 minutes. The following activities were included:  Lunge matrix 5 ways 10x ea    Jovanni received the following manual therapy techniques: Joint mobilizations were applied to the: R knee for 10 minutes, including:  AP femoral mobs  Fat pad mobs long sitting and in knee flexion  Patella mobs long sitting and in slight knee flexion  Supine knee distraction grade I-II   Sup patella mobs/inf patella mobs grade III- IV      Home Exercises Provided and Patient Education Provided     Education provided:   - proper gait   - foam rolling to dec ms stiffness perception     Written Home Exercises Provided: Patient instructed to cont prior HEP.Pt   Exercises were reviewed and Jovanni was able to demonstrate them prior to the end of the session.  Jovanni demonstrated good  understanding of the education provided.     See EMR under Media for exercises provided at .    Assessment     The pt with cont stiffness of knee causing pain and dysfunction leading to cont dec in mobility. Able to complete exercises with difficulty and cont dec in TKE. Cont to prog as aleena.        Jovanni is progressing well towards his goals.   Pt prognosis is Good.     Pt will continue to benefit from skilled outpatient physical therapy to address the deficits listed in the problem list box on initial evaluation, provide pt/family education and to maximize pt's level of independence in the home and community environment.     Pt's spiritual, cultural and educational needs considered and pt agreeable to plan of care and goals.    Anticipated barriers to physical " therapy: work schedule    GOALS: Short Term Goals:  12 weeks  1.Report decreased R knee pain  < / =  2/10  to increase tolerance for walking/stairs Progressing, not met  2. Increase knee ROM to 5 hyperextension to 135 degrees of knee flexion in order to be able to perform ADLs without difficulty. Progressing, not met  3. Increase strength to 4-/5 MMT grade in R LE  to increase tolerance for ADL and work activities. Progressing, not met  4. Pt to tolerate HEP to improve ROM and independence with ADL's Progressing, not met     Long Term Goals: 24 weeks  1.Report decreased R knee pain < / = 0/10  to increase tolerance for running, biking and swimming. Progressing, not met  2.Patient goal: To be able to return to recreational activities - running, biking, swimming. Progressing, not met  3.Increase strength to >/= 4+/5 in R LE  to increase tolerance for ADL and work activities. Progressing, not met  4. Pt will be able to perform SL squat and lateral bounding without difficulty or compensation in order to return to running. Progressing, not met    Plan     Continue with established Plan of Care towards PT goals, per ACL BTB autograft protocol (Dr. Álvarez).    Brenda Royal, PT, DPT

## 2020-03-13 ENCOUNTER — CLINICAL SUPPORT (OUTPATIENT)
Dept: REHABILITATION | Facility: HOSPITAL | Age: 35
End: 2020-03-13
Payer: COMMERCIAL

## 2020-03-13 DIAGNOSIS — M25.561 ACUTE PAIN OF RIGHT KNEE: ICD-10-CM

## 2020-03-13 PROCEDURE — 97530 THERAPEUTIC ACTIVITIES: CPT

## 2020-03-13 PROCEDURE — 97140 MANUAL THERAPY 1/> REGIONS: CPT

## 2020-03-13 PROCEDURE — 97112 NEUROMUSCULAR REEDUCATION: CPT

## 2020-03-13 PROCEDURE — 97110 THERAPEUTIC EXERCISES: CPT

## 2020-03-13 NOTE — PROGRESS NOTES
Physical Therapy Daily Treatment Note     Name: Jovanni Kerr  Clinic Number: [id number]    Therapy Diagnosis:   Encounter Diagnosis   Name Primary?    Acute pain of right knee      Physician: JOSEF Álvarez MD    Visit Date: 3/13/2020  Physician Orders: PT Eval and Treat   Medical Diagnosis from Referral: S83.511A (ICD-10-CM) - Rupture of anterior cruciate ligament of right knee, initial encounter  Evaluation Date: 11/19/2019  Authorization Period Expiration: 12/31/2020  Plan of Care Expiration: 5/5/2020  Visit # / Visits authorized: 11/ 30  Visit # total: 21     Time In: 1515  Time Out: 1420  Total Billable Time: 32 minutes     Precautions: Standard, Patient will weight bear as tolerates with the brace locked in extension. Range of motion may be full. Plan to follow an ACL autograft rehab protocol. Initial goals include maintenance of full knee extension, regaining flexion, swelling control, and quadriceps activation exercises. May discontinue the brace was the quad is firing well. Expected release for sports no earlier than 8-9 months following Sport Cord testing.        PROCEDURES PERFORMED on 11/18/19:   Right knee arthroscopic bone patellar tendon bone autograft ACL reconstruction (CPT 32672)  Right knee arthroscopic lateral plica band resection (CPT 17274)  Right knee arthroscopic partial lateral release (CPT 63147)    Subjective     Pt reports: that he is doing okay.      He was compliant with home exercise program.  Response to previous treatment: no adverse effects  Functional change: Pt ambulating with soft knee brace.    Pain: 1/10  Location: right knee     Objective     2/27/2020  ROM: X-0-115 with mild to mod over pressure to ext and flexion   Hypomobile fat pad (severe), mod hypomobile inf/sup patella mob    Jovanni received therapeutic exercises to develop strength, endurance, ROM and flexibility for 25 minutes including  QS holds 10x10s   Prone hangs 6m 5#        Shuttle DL 4 cords 2x20  "  Single Leg SL 4x8 2 cords       Jovanni participated in dynamic functional therapeutic activities to improve functional performance for 30  minutes, includin Rds:   Plank 60s   Step ups w/ high knee 12x 12"   Lateral walk btb at knees <->     Jovanni participated in neuromuscular re-education activities to improve: motor control for 10 minutes. The following activities were included:  Lunge matrix 5 ways 10x ea    Jovanni received the following manual therapy techniques: Joint mobilizations were applied to the: R knee for 10 minutes, including:  AP femoral mobs  Fat pad mobs long sitting and in knee flexion  Patella mobs long sitting and in slight knee flexion  Supine knee distraction grade I-II   Sup patella mobs/inf patella mobs grade III- IV      Home Exercises Provided and Patient Education Provided     Education provided:   - proper gait   - foam rolling to dec ms stiffness perception     Written Home Exercises Provided: Patient instructed to cont prior HEP.  Exercises were reviewed and Jovanni was able to demonstrate them prior to the end of the session.  Jovanni demonstrated good  understanding of the education provided.     See EMR under Media for exercises provided at IE.    Assessment     Pt continues to present with stiffness and decreased mobility.  Pt continues to lack knee extension when compared to the L LE.  After knee extension stretching, increased stiffness with knee flexion. Cont to prog as aleena.  Mateo ([employer]) to come next week.        Jovanni is progressing well towards his goals.   Pt prognosis is Good.     Pt will continue to benefit from skilled outpatient physical therapy to address the deficits listed in the problem list box on initial evaluation, provide pt/family education and to maximize pt's level of independence in the home and community environment.     Pt's spiritual, cultural and educational needs considered and pt agreeable to plan of care and goals.    Anticipated " barriers to physical therapy: work schedule    GOALS: Short Term Goals:  12 weeks  1.Report decreased R knee pain  < / =  2/10  to increase tolerance for walking/stairs Progressing, not met  2. Increase knee ROM to 5 hyperextension to 135 degrees of knee flexion in order to be able to perform ADLs without difficulty. Progressing, not met  3. Increase strength to 4-/5 MMT grade in R LE  to increase tolerance for ADL and work activities. Progressing, not met  4. Pt to tolerate HEP to improve ROM and independence with ADL's Progressing, not met     Long Term Goals: 24 weeks  1.Report decreased R knee pain < / = 0/10  to increase tolerance for running, biking and swimming. Progressing, not met  2.Patient goal: To be able to return to recreational activities - running, biking, swimming. Progressing, not met  3.Increase strength to >/= 4+/5 in R LE  to increase tolerance for ADL and work activities. Progressing, not met  4. Pt will be able to perform SL squat and lateral bounding without difficulty or compensation in order to return to running. Progressing, not met    Plan     Continue with established Plan of Care towards PT goals, per ACL BTB autograft protocol (Dr. Álvarez).    Kiera Lugo, PT, DPT

## 2020-03-16 ENCOUNTER — TELEPHONE (OUTPATIENT)
Dept: SPORTS MEDICINE | Facility: CLINIC | Age: 35
End: 2020-03-16

## 2020-03-16 NOTE — TELEPHONE ENCOUNTER
Telemedicine Note    The patient's scheduled appointment was canceled due to the current COVID-19 pandemic with national, state, and local recommendations to limit non urgent and non emergent patient flow through the clinics.  This has been done to protect the patient and the medical staff.  I did call the patient to check in on him.    I just saw the patient about a week ago.  I called and left a voicemail.  We will discuss over the phone.

## 2020-03-23 ENCOUNTER — CLINICAL SUPPORT (OUTPATIENT)
Dept: REHABILITATION | Facility: HOSPITAL | Age: 35
End: 2020-03-23
Payer: COMMERCIAL

## 2020-03-23 DIAGNOSIS — M25.561 ACUTE PAIN OF RIGHT KNEE: ICD-10-CM

## 2020-03-23 PROCEDURE — 97140 MANUAL THERAPY 1/> REGIONS: CPT

## 2020-03-23 PROCEDURE — 97110 THERAPEUTIC EXERCISES: CPT

## 2020-03-23 PROCEDURE — 97112 NEUROMUSCULAR REEDUCATION: CPT

## 2020-03-23 NOTE — PROGRESS NOTES
Physical Therapy Daily Treatment Note     Name: Jovanni Kerr  Clinic Number: [id number]    Therapy Diagnosis:   No diagnosis found.  Physician: JOSEF Álvarez MD    Visit Date: 3/23/2020  Physician Orders: PT Eval and Treat   Medical Diagnosis from Referral: S83.511A (ICD-10-CM) - Rupture of anterior cruciate ligament of right knee, initial encounter  Evaluation Date: 11/19/2019  Authorization Period Expiration: 12/31/2020  Plan of Care Expiration: 5/5/2020  Visit # / Visits authorized: 13/ 30  Visit # total: 23     Time In: 1600  Time Out: 1700  Total Billable Time: 55 minutes     Precautions: Standard, Patient will weight bear as tolerates with the brace locked in extension. Range of motion may be full. Plan to follow an ACL autograft rehab protocol. Initial goals include maintenance of full knee extension, regaining flexion, swelling control, and quadriceps activation exercises. May discontinue the brace was the quad is firing well. Expected release for sports no earlier than 8-9 months following Sport Cord testing.        PROCEDURES PERFORMED on 11/18/19:   Right knee arthroscopic bone patellar tendon bone autograft ACL reconstruction (CPT 80275)  Right knee arthroscopic lateral plica band resection (CPT 88991)  Right knee arthroscopic partial lateral release (CPT 33700)    Subjective     Pt reports: his knee doesn't feel as sore today but has not been completing as many surgeries. Notes his knee feels best after the dynasplint and then f/u with quad exrcises.     He was compliant with home exercise program.  Response to previous treatment: no adverse effects  Functional change: Pt ambulating with soft knee brace.    Pain: 1/10  Location: right knee     Objective     2/27/2020  ROM: X-0-115 with mild to mod over pressure to ext and flexion   Hypomobile fat pad (severe), mod hypomobile inf/sup patella mob    Jovanni received therapeutic exercises to develop strength, endurance, ROM and flexibility for 28  minutes including    Shuttle DL 20x 4 cords   Shuttle SL 5x5 3 cords     Inchworms 5x 4 rounds       Jovanni participated in dynamic functional therapeutic activities to improve functional performance for 00  minutes, including:      Jovanni participated in neuromuscular re-education activities to improve: motor control for 12 minutes. The following activities were included:  Lunge matrix 5 ways 10x ea  LAQ 20x B    Jovanni received the following manual therapy techniques: Joint mobilizations were applied to the: R knee for 15 minutes, including:  AP femoral mobs  Fat pad mobs long sitting and in knee flexion  Patella mobs long sitting and in slight knee flexion  Supine knee distraction grade I-II   Sup patella mobs/inf patella mobs grade III- IV      Home Exercises Provided and Patient Education Provided     Education provided:   - proper gait   - foam rolling to dec ms stiffness perception     Written Home Exercises Provided: Patient instructed to cont prior HEP.  Exercises were reviewed and Jovanni was able to demonstrate them prior to the end of the session.  Jovanni demonstrated good  understanding of the education provided.     See EMR under Media for exercises provided at IE.    Assessment     The pt with cont ant stiffness of knee but improvement in TKE and LAQ control indicating improved quad function. The pt advised on contacting dynasplint rep for an at home visit to address his brace wear prn. The pt able to complete exercises without sig discomfort.       Jovanni is progressing well towards his goals.   Pt prognosis is Good.     Pt will continue to benefit from skilled outpatient physical therapy to address the deficits listed in the problem list box on initial evaluation, provide pt/family education and to maximize pt's level of independence in the home and community environment.     Pt's spiritual, cultural and educational needs considered and pt agreeable to plan of care and goals.    Anticipated  barriers to physical therapy: work schedule    GOALS: Short Term Goals:  12 weeks  1.Report decreased R knee pain  < / =  2/10  to increase tolerance for walking/stairs Progressing, not met  2. Increase knee ROM to 5 hyperextension to 135 degrees of knee flexion in order to be able to perform ADLs without difficulty. Progressing, not met  3. Increase strength to 4-/5 MMT grade in R LE  to increase tolerance for ADL and work activities. Progressing, not met  4. Pt to tolerate HEP to improve ROM and independence with ADL's Progressing, not met     Long Term Goals: 24 weeks  1.Report decreased R knee pain < / = 0/10  to increase tolerance for running, biking and swimming. Progressing, not met  2.Patient goal: To be able to return to recreational activities - running, biking, swimming. Progressing, not met  3.Increase strength to >/= 4+/5 in R LE  to increase tolerance for ADL and work activities. Progressing, not met  4. Pt will be able to perform SL squat and lateral bounding without difficulty or compensation in order to return to running. Progressing, not met    Plan     Continue with established Plan of Care towards PT goals, per ACL BTB autograft protocol (Dr. Álvarez).    Brenda Royal, PT, DPT

## 2020-03-30 ENCOUNTER — CLINICAL SUPPORT (OUTPATIENT)
Dept: REHABILITATION | Facility: HOSPITAL | Age: 35
End: 2020-03-30
Payer: COMMERCIAL

## 2020-03-30 DIAGNOSIS — M25.561 ACUTE PAIN OF RIGHT KNEE: ICD-10-CM

## 2020-03-30 PROCEDURE — 97110 THERAPEUTIC EXERCISES: CPT

## 2020-03-30 PROCEDURE — 97140 MANUAL THERAPY 1/> REGIONS: CPT

## 2020-03-30 NOTE — PROGRESS NOTES
Physical Therapy Daily Treatment Note     Name: Jovanni Kerr  Clinic Number: [id number]    Therapy Diagnosis:   Encounter Diagnosis   Name Primary?    Acute pain of right knee      Physician: JOSEF Álvarez MD    Visit Date: 3/30/2020  Physician Orders: PT Eval and Treat   Medical Diagnosis from Referral: S83.511A (ICD-10-CM) - Rupture of anterior cruciate ligament of right knee, initial encounter  Evaluation Date: 11/19/2019  Authorization Period Expiration: 12/31/2020  Plan of Care Expiration: 5/5/2020  Visit # / Visits authorized: 14/30  Visit # total: 24     Time In: 1510  Time Out: 1602  Total Billable Time: 52 minutes     Precautions: Standard, Patient will weight bear as tolerates with the brace locked in extension. Range of motion may be full. Plan to follow an ACL autograft rehab protocol. Initial goals include maintenance of full knee extension, regaining flexion, swelling control, and quadriceps activation exercises. May discontinue the brace was the quad is firing well. Expected release for sports no earlier than 8-9 months following Sport Cord testing.     PROCEDURES PERFORMED on 11/18/19:   Right knee arthroscopic bone patellar tendon bone autograft ACL reconstruction (CPT 58208)  Right knee arthroscopic lateral plica band resection (CPT 12556)  Right knee arthroscopic partial lateral release (CPT 48796)    Subjective     Pt reports: that he feels like he's doing better, especially w/ reduced caseload and OR time during Covid-19 changes. States he's been consistently using dynasplint for a couple of hours each night.    He was compliant with home exercise program.  Response to previous treatment: no adverse effects  Functional change: None significant    Pain: 1/10  Location: right knee     Objective     3/30/2020  ROM: X-0-118  Hypomobile fat pad, mod hypomobile inf/sup patella mob    Jovanni received therapeutic exercises to develop strength, endurance, ROM and flexibility for 40 minutes  "including  - Knee ext stretch w/ strap 20x10"  - Box step up 12" x20  - Lat step down 6" x20  - Shuttle SL 3x10 2 cords (303X tempo)   - Seated knee extension 2x10 (2.5#)  - Seated heel slide x20    Jovanni participated in dynamic functional therapeutic activities to improve functional performance for 0 minutes, including:    Jovanni participated in neuromuscular re-education activities to improve: motor control for 0 minutes. The following activities were included:    Jovanni received the following manual therapy techniques: Joint mobilizations were applied to the: R knee for 12 minutes, including:  - AP femoral mobs  - Fat pad mobs long sitting and in knee flexion  - Patella mobs long sitting and in slight knee flexion  - Supine knee distraction grade I-II   - Sup patella mobs/inf patella mobs grade III- IV    Home Exercises Provided and Patient Education Provided     Education provided:   - proper gait   - foam rolling to dec ms stiffness perception     Written Home Exercises Provided: Patient instructed to cont prior HEP.  Exercises were reviewed and Jovanni was able to demonstrate them prior to the end of the session.  Jovanni demonstrated good  understanding of the education provided.     See EMR under Media for exercises provided at .    Assessment     Seems to be improving some w/ consistent use of dynasplint for extension. Able to achieve neutral extension w/ overpressure. Good quad tone with setting. Mostly limited by anterior knee pain with CKC exercises, but improved with utilization of slower tempos for increased time under tension. Good tolerance to OKC loading w/ no adverse effects. Provided w/ updated HEP per Shanae and demonstrates good understanding of all exercises. Will f/u prn.    Jovanni is progressing well towards his goals.   Pt prognosis is Good.     Pt will continue to benefit from skilled outpatient physical therapy to address the deficits listed in the problem list box on initial " evaluation, provide pt/family education and to maximize pt's level of independence in the home and community environment.     Pt's spiritual, cultural and educational needs considered and pt agreeable to plan of care and goals.    Anticipated barriers to physical therapy: work schedule    GOALS: Short Term Goals:  12 weeks  1.Report decreased R knee pain  < / =  2/10  to increase tolerance for walking/stairs Progressing, not met  2. Increase knee ROM to 5 hyperextension to 135 degrees of knee flexion in order to be able to perform ADLs without difficulty. Progressing, not met  3. Increase strength to 4-/5 MMT grade in R LE  to increase tolerance for ADL and work activities. Progressing, not met  4. Pt to tolerate HEP to improve ROM and independence with ADL's Progressing, not met     Long Term Goals: 24 weeks  1.Report decreased R knee pain < / = 0/10  to increase tolerance for running, biking and swimming. Progressing, not met  2.Patient goal: To be able to return to recreational activities - running, biking, swimming. Progressing, not met  3.Increase strength to >/= 4+/5 in R LE  to increase tolerance for ADL and work activities. Progressing, not met  4. Pt will be able to perform SL squat and lateral bounding without difficulty or compensation in order to return to running. Progressing, not met    Plan     Continue with established Plan of Care towards PT goals, per ACL BTB autograft protocol (Dr. Álvarez).    Nora Lopez, PT, DPT

## 2020-03-30 NOTE — PROGRESS NOTES
Physical Therapy Daily Treatment Note     Name: Jovanni Kerr  Clinic Number: [id number]    Therapy Diagnosis:   No diagnosis found.  Physician: JOSEF Álvarez MD    Visit Date: 3/30/2020  Physician Orders: PT Eval and Treat   Medical Diagnosis from Referral: S83.511A (ICD-10-CM) - Rupture of anterior cruciate ligament of right knee, initial encounter  Evaluation Date: 11/19/2019  Authorization Period Expiration: 12/31/2020  Plan of Care Expiration: 5/5/2020  Visit # / Visits authorized: 14/ 30  Visit # total: 24     Time In: ***  Time Out: ***  Total Billable Time: *** minutes     Precautions: Standard, Patient will weight bear as tolerates with the brace locked in extension. Range of motion may be full. Plan to follow an ACL autograft rehab protocol. Initial goals include maintenance of full knee extension, regaining flexion, swelling control, and quadriceps activation exercises. May discontinue the brace was the quad is firing well. Expected release for sports no earlier than 8-9 months following Sport Cord testing.        PROCEDURES PERFORMED on 11/18/19:   Right knee arthroscopic bone patellar tendon bone autograft ACL reconstruction (CPT 36619)  Right knee arthroscopic lateral plica band resection (CPT 96074)  Right knee arthroscopic partial lateral release (CPT 01769)    Subjective     Pt reports: ***    He was compliant with home exercise program.  Response to previous treatment: no adverse effects  Functional change: Pt ambulating with soft knee brace.    Pain: 1/10  Location: right knee     Objective     2/27/2020 ***  ROM: X-0-115 with mild to mod over pressure to ext and flexion   Hypomobile fat pad (severe), mod hypomobile inf/sup patella mob    Jovanni received therapeutic exercises to develop strength, endurance, ROM and flexibility for *** minutes including  - Shuttle DL 20x 4 cords   - Shuttle SL 5x5 3 cords   - Inchworms 5x 4 rounds     Jovanni participated in dynamic functional therapeutic  activities to improve functional performance for 00  minutes, including:    Jovanni participated in neuromuscular re-education activities to improve: motor control for *** minutes. The following activities were included:  - Lunge matrix 5 ways 10x ea  - LAQ 20x B    Jovanni received the following manual therapy techniques: Joint mobilizations were applied to the: R knee for *** minutes, including:  - AP femoral mobs  - Fat pad mobs long sitting and in knee flexion  - Patella mobs long sitting and in slight knee flexion  - Supine knee distraction grade I-II   - Sup patella mobs/inf patella mobs grade III- IV    Home Exercises Provided and Patient Education Provided     Education provided:   - proper gait   - foam rolling to dec ms stiffness perception     Written Home Exercises Provided: Patient instructed to cont prior HEP.  Exercises were reviewed and Jovanni was able to demonstrate them prior to the end of the session.  Jovanni demonstrated good  understanding of the education provided.     See EMR under Media for exercises provided at IE.    Assessment     ***    Jovanni is progressing well towards his goals.   Pt prognosis is Good.     Pt will continue to benefit from skilled outpatient physical therapy to address the deficits listed in the problem list box on initial evaluation, provide pt/family education and to maximize pt's level of independence in the home and community environment.     Pt's spiritual, cultural and educational needs considered and pt agreeable to plan of care and goals.    Anticipated barriers to physical therapy: work schedule    GOALS: Short Term Goals:  12 weeks  1.Report decreased R knee pain  < / =  2/10  to increase tolerance for walking/stairs Progressing, not met  2. Increase knee ROM to 5 hyperextension to 135 degrees of knee flexion in order to be able to perform ADLs without difficulty. Progressing, not met  3. Increase strength to 4-/5 MMT grade in R LE  to increase tolerance  for ADL and work activities. Progressing, not met  4. Pt to tolerate HEP to improve ROM and independence with ADL's Progressing, not met     Long Term Goals: 24 weeks  1.Report decreased R knee pain < / = 0/10  to increase tolerance for running, biking and swimming. Progressing, not met  2.Patient goal: To be able to return to recreational activities - running, biking, swimming. Progressing, not met  3.Increase strength to >/= 4+/5 in R LE  to increase tolerance for ADL and work activities. Progressing, not met  4. Pt will be able to perform SL squat and lateral bounding without difficulty or compensation in order to return to running. Progressing, not met    Plan     Continue with established Plan of Care towards PT goals, per ACL BTB autograft protocol (Dr. Álvarez).    Nora Lopez, PT, DPT

## 2020-04-21 DIAGNOSIS — Z01.84 ANTIBODY RESPONSE EXAMINATION: ICD-10-CM

## 2020-04-23 ENCOUNTER — LAB VISIT (OUTPATIENT)
Dept: LAB | Facility: HOSPITAL | Age: 35
End: 2020-04-23
Attending: INTERNAL MEDICINE
Payer: COMMERCIAL

## 2020-04-23 DIAGNOSIS — Z01.84 ANTIBODY RESPONSE EXAMINATION: ICD-10-CM

## 2020-04-23 LAB — SARS-COV-2 IGG SERPL QL IA: NEGATIVE

## 2020-04-23 PROCEDURE — 36415 COLL VENOUS BLD VENIPUNCTURE: CPT

## 2020-04-23 PROCEDURE — 86769 SARS-COV-2 COVID-19 ANTIBODY: CPT

## 2020-05-19 ENCOUNTER — TELEPHONE (OUTPATIENT)
Dept: SPORTS MEDICINE | Facility: CLINIC | Age: 35
End: 2020-05-19

## 2020-07-28 ENCOUNTER — HOSPITAL ENCOUNTER (EMERGENCY)
Facility: HOSPITAL | Age: 35
Discharge: HOME | End: 2020-07-28
Attending: EMERGENCY MEDICINE
Payer: COMMERCIAL

## 2020-07-28 ENCOUNTER — TELEPHONE (OUTPATIENT)
Dept: SPORTS MEDICINE | Facility: CLINIC | Age: 35
End: 2020-07-28

## 2020-07-28 VITALS
DIASTOLIC BLOOD PRESSURE: 78 MMHG | HEART RATE: 85 BPM | TEMPERATURE: 98 F | WEIGHT: 175 LBS | BODY MASS INDEX: 26.52 KG/M2 | HEIGHT: 68 IN | SYSTOLIC BLOOD PRESSURE: 135 MMHG | RESPIRATION RATE: 16 BRPM | OXYGEN SATURATION: 98 %

## 2020-07-28 DIAGNOSIS — Z77.21 EXPOSURE TO BLOOD OR BODY FLUID: Primary | ICD-10-CM

## 2020-07-28 PROCEDURE — 99284 EMERGENCY DEPT VISIT MOD MDM: CPT | Mod: ,,, | Performed by: PHYSICIAN ASSISTANT

## 2020-07-28 PROCEDURE — 99283 EMERGENCY DEPT VISIT LOW MDM: CPT

## 2020-07-28 PROCEDURE — 86706 HEP B SURFACE ANTIBODY: CPT

## 2020-07-28 PROCEDURE — 99284 PR EMERGENCY DEPT VISIT,LEVEL IV: ICD-10-PCS | Mod: ,,, | Performed by: PHYSICIAN ASSISTANT

## 2020-07-28 PROCEDURE — 86705 HEP B CORE ANTIBODY IGM: CPT

## 2020-07-28 PROCEDURE — 86803 HEPATITIS C AB TEST: CPT

## 2020-07-28 PROCEDURE — 86703 HIV-1/HIV-2 1 RESULT ANTBDY: CPT

## 2020-07-28 NOTE — ED NOTES
Patient identifiers verified and correct for MR Kerr  C/C: Puncture wound to left hand thumb SEE NN  APPEARANCE: awake and alert in NAD.  SKIN: warm, dry and intact. No breakdown or bruising.  MUSCULOSKELETAL: Patient moving all extremities spontaneously, no obvious swelling or deformities noted. Ambulates independently.  RESPIRATORY: Denies shortness of breath.Respirations unlabored.   CARDIAC: Denies CP, 2+ distal pulses; no peripheral edema  ABDOMEN: S/ND/NT, Denies nausea  : voids spontaneously, denies difficulty  Neurologic: AAO x 4; follows commands equal strength in all extremities; denies numbness/tingling. Denies dizziness Denies weakness

## 2020-07-28 NOTE — ED PROVIDER NOTES
Encounter Date: 7/28/2020       History     Chief Complaint   Patient presents with    Body Fluid Exposure     + left thumb during suture procedure.      Patient is a 34-year-old male with no past medical history who presents the emergency department with complaints of acute needle stick to the left thumb that occurred today while suturing during surgery. His patient's rapid HIV test was negative. Unknown hepatitis status. Reports mild pain to the thumb however washed it well prior to arrival. Otherwise feels well, denies other symptoms. Denies worsening or alleviating factors. This is the extent of the patient's complaints.         Review of patient's allergies indicates:  No Known Allergies  History reviewed. No pertinent past medical history.  Past Surgical History:   Procedure Laterality Date    KNEE ARTHROSCOPY W/ ACL RECONSTRUCTION Right 11/18/2019    Procedure: RECONSTRUCTION, KNEE, ACL, ARTHROSCOPIC;  Surgeon: JOSEF Álvarez MD;  Location: [facility];  Service: Orthopedics;  Laterality: Right;  REGIONAL W/CATHETER, ADDUCTOR     Family History   Problem Relation Age of Onset    Hypertension Mother     Stroke Mother 60    Hypothyroidism Mother     Hypertension Father     Tremor Father     Polycystic kidney disease Sister         transplant as kid     Social History     Tobacco Use    Smoking status: Never Smoker    Smokeless tobacco: Never Used   Substance Use Topics    Alcohol use: Yes     Frequency: 2-4 times a month     Drinks per session: 1 or 2     Comment: occas    Drug use: Never     Review of Systems   Constitutional: Negative for chills and fever.   HENT: Negative for congestion and sore throat.    Eyes: Negative for visual disturbance.   Respiratory: Negative for shortness of breath.    Cardiovascular: Negative for chest pain.   Gastrointestinal: Negative for nausea.   Genitourinary: Negative for dysuria.   Musculoskeletal: Negative for back pain.   Skin: Positive for wound. Negative for  rash.   Neurological: Negative for weakness and headaches.   Hematological: Does not bruise/bleed easily.       Physical Exam     Initial Vitals [07/28/20 1641]   BP Pulse Resp Temp SpO2   135/78 85 16 98.4 °F (36.9 °C) 98 %      MAP       --         Physical Exam    Nursing note and vitals reviewed.  Constitutional: He appears well-developed and well-nourished. He is not diaphoretic. No distress.   HENT:   Head: Normocephalic and atraumatic.   Mouth/Throat: Oropharynx is clear and moist.   Eyes: EOM are normal. Pupils are equal, round, and reactive to light.   Neck: Normal range of motion. Neck supple.   Cardiovascular: Normal rate, regular rhythm, normal heart sounds and intact distal pulses. Exam reveals no gallop and no friction rub.    No murmur heard.  Pulmonary/Chest: Breath sounds normal. He has no wheezes. He has no rhonchi. He has no rales.   Abdominal: Soft. Bowel sounds are normal. There is no abdominal tenderness.   Musculoskeletal: Normal range of motion.   Neurological: He is alert and oriented to person, place, and time. He has normal strength. GCS score is 15. GCS eye subscore is 4. GCS verbal subscore is 5. GCS motor subscore is 6.   Skin: Skin is warm and dry. Capillary refill takes less than 2 seconds.   Small, pinpoint puncture wound to left thumb. No active bleeding.    Psychiatric: He has a normal mood and affect. His behavior is normal. Judgment and thought content normal.         ED Course   Procedures  Labs Reviewed   HIV 1 / 2 ANTIBODY   HEPATITIS B SURFACE ANTIBODY   HEPATITIS B CORE ANTIBODY, IGM   HEPATITIS C ANTIBODY               Medical Decision Making:   History:   Old Medical Records: I decided to obtain old medical records.  Initial Assessment:   Emergent evaluation of a 34-year-old male who presents the emergency department with complaints of needle stick injury to left thumb that occurred while suturing today during surgery. His patient's rapid HIV test was negative.  He  attempted to contact [facility] however there ordered closed for the day and therefore presented to the ED. Patient is afebrile, he, and nontoxic appearing.  Will order labs.  Differential Diagnosis:   DDx includes but is not limited to hepatitis exposure, HIV exposure, cellulitis, puncture wound.   Clinical Tests:   Lab Tests: Ordered and Reviewed  ED Management:  HIV and hepatitis panels drawn.  Will call patient with positive results.  He voices understanding.  Questions answered.  The patient was instructed to follow up with [facility] tomorrow or to return to the emergency department for worsening symptoms. The treatment plan was discussed with the patient who demonstrated understanding and comfort with plan. The patient's history, physical exam, and plan of care was discussed with and agreed upon with my supervising physician.                                    Clinical Impression:     1. Exposure to blood or body fluid                ED Disposition Condition    Discharge Stable        ED Prescriptions     None        Follow-up Information     Follow up With Specialties Details Why Contact Info    [facility] Emergency Medicine Go to  If symptoms worsen [address] [id number] [phone]    [facility]  Schedule an appointment as soon as possible for a visit in 1 day

## 2020-07-28 NOTE — TELEPHONE ENCOUNTER
Spoke with patient about apt. He was unable to make it today. He wishes to reschedule for Tuesday August 4 at 1530. Confirmed apt time with patient. All questions were answered to patient's satisfaction.

## 2020-07-28 NOTE — ED NOTES
Discharge home, states understanding to follow up as directed. Ambulates out of ED without difficult. Discharge paperwork per PA,.

## 2020-07-28 NOTE — ED TRIAGE NOTES
Patient states he was stuck with needle at 1430 today while suturing, left thumb. Cleansed PTA. Patient eating, declines NPO.

## 2020-07-29 LAB
HBV CORE IGM SERPL QL IA: NEGATIVE
HBV SURFACE AB SER-ACNC: POSITIVE M[IU]/ML
HCV AB SERPL QL IA: NEGATIVE
HIV 1+2 AB+HIV1 P24 AG SERPL QL IA: NEGATIVE

## 2020-08-05 ENCOUNTER — TELEPHONE (OUTPATIENT)
Dept: SPORTS MEDICINE | Facility: CLINIC | Age: 35
End: 2020-08-05

## 2020-08-05 NOTE — TELEPHONE ENCOUNTER
Pt's scrub nurse (Ayse) answered phone. Told her to have pt return call to reschedule missed appointment with Dr. Álvarez.

## 2020-08-19 ENCOUNTER — OFFICE VISIT (OUTPATIENT)
Dept: SPORTS MEDICINE | Facility: CLINIC | Age: 35
End: 2020-08-19
Payer: COMMERCIAL

## 2020-08-19 ENCOUNTER — HOSPITAL ENCOUNTER (OUTPATIENT)
Dept: RADIOLOGY | Facility: HOSPITAL | Age: 35
Discharge: HOME | End: 2020-08-19
Attending: ORTHOPAEDIC SURGERY
Payer: COMMERCIAL

## 2020-08-19 VITALS
SYSTOLIC BLOOD PRESSURE: 116 MMHG | WEIGHT: 175 LBS | HEIGHT: 68 IN | BODY MASS INDEX: 26.52 KG/M2 | HEART RATE: 70 BPM | DIASTOLIC BLOOD PRESSURE: 78 MMHG

## 2020-08-19 DIAGNOSIS — M25.561 RIGHT KNEE PAIN, UNSPECIFIED CHRONICITY: ICD-10-CM

## 2020-08-19 DIAGNOSIS — M25.661 KNEE STIFFNESS, RIGHT: Primary | ICD-10-CM

## 2020-08-19 PROCEDURE — 73562 X-RAY EXAM OF KNEE 3: CPT | Mod: TC,LT

## 2020-08-19 PROCEDURE — 99999 PR PBB SHADOW E&M-EST. PATIENT-LVL IV: ICD-10-PCS | Mod: PBBFAC,,, | Performed by: ORTHOPAEDIC SURGERY

## 2020-08-19 PROCEDURE — 73562 XR KNEE ORTHO RIGHT WITH FLEXION: ICD-10-PCS | Mod: 26,LT,, | Performed by: RADIOLOGY

## 2020-08-19 PROCEDURE — 3008F BODY MASS INDEX DOCD: CPT | Mod: CPTII,S$GLB,, | Performed by: ORTHOPAEDIC SURGERY

## 2020-08-19 PROCEDURE — 73564 XR KNEE ORTHO RIGHT WITH FLEXION: ICD-10-PCS | Mod: 26,RT,, | Performed by: RADIOLOGY

## 2020-08-19 PROCEDURE — 3008F PR BODY MASS INDEX (BMI) DOCUMENTED: ICD-10-PCS | Mod: CPTII,S$GLB,, | Performed by: ORTHOPAEDIC SURGERY

## 2020-08-19 PROCEDURE — 99214 OFFICE O/P EST MOD 30 MIN: CPT | Mod: S$GLB,,, | Performed by: ORTHOPAEDIC SURGERY

## 2020-08-19 PROCEDURE — 73564 X-RAY EXAM KNEE 4 OR MORE: CPT | Mod: 26,RT,, | Performed by: RADIOLOGY

## 2020-08-19 PROCEDURE — 99214 PR OFFICE/OUTPT VISIT, EST, LEVL IV, 30-39 MIN: ICD-10-PCS | Mod: S$GLB,,, | Performed by: ORTHOPAEDIC SURGERY

## 2020-08-19 PROCEDURE — 99999 PR PBB SHADOW E&M-EST. PATIENT-LVL IV: CPT | Mod: PBBFAC,,, | Performed by: ORTHOPAEDIC SURGERY

## 2020-08-19 PROCEDURE — 73562 X-RAY EXAM OF KNEE 3: CPT | Mod: 26,LT,, | Performed by: RADIOLOGY

## 2020-08-19 NOTE — PROGRESS NOTES
DATE OF PROCEDURE: 11/18/2019   PROCEDURES PERFORMED:   Right knee arthroscopic bone patellar tendon bone autograft ACL reconstruction   Right knee arthroscopic lateral plica band resection   Right knee arthroscopic partial lateral release      Jovanni Kerr returns to clinic today 9 months status post above mentioned procedure. He does report slow and steady progress. He has no pain but does report moderate limitations in active knee flexion and extension. He does have a dynamic extension splint at home to assist with the extension issue but recently returned it 3 weeks ago. Proceeded with right knee aspiration on 3/5/2020. He has been doing home exercises which have been going well. He has not been to formal physical therapy for 5 months.  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    REVIEW OF SYSTEMS:   Constitution: Negative. Negative for chills, fever and night sweats.    Hematologic/Lymphatic: Negative for bleeding problem. Does not bruise/bleed easily.   Skin: Negative for dry skin, itching and rash.   Musculoskeletal: Negative for falls. Negative for right knee pain and muscle weakness.     All other review of symptoms were reviewed and found to be noncontributory.     PAST MEDICAL HISTORY:   No past medical history on file.    PAST SURGICAL HISTORY:   Past Surgical History:   Procedure Laterality Date    KNEE ARTHROSCOPY W/ ACL RECONSTRUCTION Right 11/18/2019    Procedure: RECONSTRUCTION, KNEE, ACL, ARTHROSCOPIC;  Surgeon: JOSEF Álvarez MD;  Location: [facility];  Service: Orthopedics;  Laterality: Right;  REGIONAL W/CATHETER, ADDUCTOR     FAMILY HISTORY:   Family History   Problem Relation Age of Onset    Hypertension Mother     Stroke Mother 60    Hypothyroidism Mother     Hypertension Father     Tremor Father     Polycystic kidney disease Sister         transplant as kid     SOCIAL HISTORY:   Social  History     Socioeconomic History    Marital status: Single     Spouse name: Not on file    Number of children: Not on file    Years of education: Not on file    Highest education level: Not on file   Occupational History    Occupation: Fellow     Comment: [employer]   Social Needs    Financial resource strain: Not on file    Food insecurity     Worry: Not on file     Inability: Not on file    Transportation needs     Medical: Not on file     Non-medical: Not on file   Tobacco Use    Smoking status: Never Smoker    Smokeless tobacco: Never Used   Substance and Sexual Activity    Alcohol use: Yes     Frequency: 2-4 times a month     Drinks per session: 1 or 2     Comment: occas    Drug use: Never    Sexual activity: Not on file   Lifestyle    Physical activity     Days per week: Not on file     Minutes per session: Not on file    Stress: Not on file   Relationships    Social connections     Talks on phone: Not on file     Gets together: Not on file     Attends [employer] service: Not on file     Active member of club or organization: Not on file     Attends meetings of clubs or organizations: Not on file     Relationship status: Not on file   Other Topics Concern    Not on file   Social History Narrative    Not on file     MEDICATIONS:     Current Outpatient Medications:     acetaminophen (TYLENOL) 325 MG tablet, Take 800 mg by mouth every 6 (six) hours as needed for Pain., Disp: , Rfl:     aspirin (ECOTRIN) 81 MG EC tablet, Take 1 tablet (81 mg total) by mouth 2 (two) times daily. for 14 days, Disp: 28 tablet, Rfl: 0    chlorzoxazone (PARAFON FORTE) 500 mg Tab, Take 2 tablets by mouth three times daily, Disp: 60 tablet, Rfl: 0    docusate sodium (COLACE) 100 MG capsule, Take 100 mg by mouth 2 (two) times daily., Disp: , Rfl:     dolutegravir (TIVICAY) 50 mg Tab, Take 1 tablet (50 mg total) by mouth once daily. (Patient not taking: Reported on 2/18/2020), Disp: 3 tablet, Rfl: 0     emtricitabine (EMTRIVA) 200 mg Cap, Take 1 capsule (200 mg total) by mouth once daily. for 3 doses, Disp: 3 capsule, Rfl: 0    emtricitabine-tenofovir 200-300 mg (TRUVADA) 200-300 mg Tab, Take 1 tablet by mouth once daily. for 3 days (Patient not taking: Reported on 2/18/2020), Disp: 3 tablet, Rfl: 0    naproxen sodium (ANAPROX) 220 MG tablet, Take 220 mg by mouth every 12 (twelve) hours., Disp: , Rfl:     ondansetron (ZOFRAN) 4 MG tablet, Take 1 tablet (4 mg total) by mouth every 8 (eight) hours as needed for Nausea. (Patient not taking: Reported on 12/3/2019), Disp: 30 tablet, Rfl: 0    ondansetron (ZOFRAN-ODT) 4 MG TbDL, Take 1 tablet (4 mg total) by mouth every 6 (six) hours as needed. (Patient not taking: Reported on 2/18/2020), Disp: 12 tablet, Rfl: 0    oxyCODONE-acetaminophen (PERCOCET) 5-325 mg per tablet, Take 1 tablet by mouth every 4 to 6 hours as needed. (Patient not taking: Reported on 12/3/2019), Disp: 28 tablet, Rfl: 0    raltegravir (ISENTRESS) 400 mg tablet, Take 1 tablet (400 mg total) by mouth 2 (two) times daily. for 3 days, Disp: 6 tablet, Rfl: 0    tenofovir (VIREAD) 300 mg Tab, Take 1 tablet (300 mg total) by mouth once daily. for 3 days, Disp: 3 tablet, Rfl: 0    ALLERGIES:   Review of patient's allergies indicates:  No Known Allergies     PHYSICAL EXAMINATION:  There were no vitals taken for this visit.  General: Well-developed well-nourished 34 y.o. malein no acute distress   Cardiovascular: Regular rhythm by palpation of distal pulse, normal color and temperature, no concerning varicosities on symptomatic side   Lungs: No labored breathing or wheezing appreciated   Neuro: Alert and oriented ×3   Psychiatric: well oriented to person, place and time, demonstrates normal mood and affect   Skin: No rashes, lesions or ulcers, normal temperature, turgor, and texture on involved extremity    Ortho/SPM Exam  Exam of the right knee shows some generalized stiffness.   lacks approximately  5-10 degrees compared to the other side.  Able to come to near neutral extension but the other side has some degree of physiologic hyperextension. Flexion to 130° with pain at terminal range. Decreased patellar mobility.  Negative Lachman.  Negative pivot shift.   Stable to varus and valgus stress.  Trace effusion.  No joint line tenderness.  Improved quadriceps bulk and tone with continued weakness.     IMAGING:  Repeat x-rays of the right knee showed no significant changes.  There is good integration of the previous graft bone plugs without migration.  No changes in hardware.    ASSESSMENT:      ICD-10-CM ICD-9-CM   1. Knee stiffness, right  M25.661 719.56     PLAN:     The patient returns to clinic today with some ongoing right knee stiffness.  The patient has had some difficulties getting into formal physical therapy after surgery.  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  He has some continued as stiffness in flexion as well with decreased patellar mobility.  At this point I think we need to consider repeat arthroscopy for lysis of adhesions and manipulation.  We have previously discussed this but it is evident at this point that any further clinical gains without additional surgery will likely be modest.  The details are repeat surgery were discussed with the patient to include the expected postop rehab recovery course.  Repeat MRI is indicated to look at the graft and to assess for any interval internal derangement.  Otherwise pain has not been the patient is issue rather this is been a rehab issue.  He will need to discuss things with his  and let us know how he would like to proceed.  He will need formal physical therapy afterwards and I have discussed this with him.    Plan would be for right knee arthroscopic lysis of adhesions with peripatellar  releases, manipulation and any other indicated procedures.    Informed Consent:    The details of the surgical procedure were explained, including the location of probable incisions and a description of possible hardware and/or grafts to be used. Alternatives to both operative and non-operative options with associated risks and benefits were discussed. The patient understands the likely convalescence after surgery and, in particular, the expected postop rehab and recovery course. The outlined risks and potential complications of the proposed procedure include but are not limited to: infection, poor wound healing, scarring, deformity, stiffness, swelling, continued or recurrent pain, instability, hardware or prosthetic failure if implanted, symptomatic hardware requiring removal, dislocation, weakness, neurovascular injury, numbness, chronic regional pain disorder, tissue nonhealing/irreparability/retear, subsequent contralateral limb injury or pathology, chondral injury, arthritis, fracture, blood clot formation, inability to return to previous level of activity, anesthetic or regional block complication up to death, need for additional procedure as indicated intraoperatively, and potential need for further surgery.    The patient was also informed and understands that the risks of surgery are greater for patients with a current condition or history of heart disease, obesity, clotting disorders, recurrent infections, steroid use, current or past smoking, and factors such as sedentary lifestyle and noncompliance with medications, therapy or follow-up. The degree of the increased risk is hard to estimate with any degree of precision. If applicable, smoking cessation was discussed.     All questions were answered. The patient has verbalized understanding of these issues and wishes to proceed with the surgery as discussed.      Procedures

## 2020-08-20 ENCOUNTER — HOSPITAL ENCOUNTER (OUTPATIENT)
Dept: RADIOLOGY | Facility: HOSPITAL | Age: 35
Discharge: HOME | End: 2020-08-20
Attending: STUDENT IN AN ORGANIZED HEALTH CARE EDUCATION/TRAINING PROGRAM
Payer: COMMERCIAL

## 2020-08-20 DIAGNOSIS — M25.661 KNEE STIFFNESS, RIGHT: ICD-10-CM

## 2020-08-20 PROCEDURE — 73721 MRI KNEE WITHOUT CONTRAST RIGHT: ICD-10-PCS | Mod: 26,RT,, | Performed by: RADIOLOGY

## 2020-08-20 PROCEDURE — 73721 MRI JNT OF LWR EXTRE W/O DYE: CPT | Mod: 26,RT,, | Performed by: RADIOLOGY

## 2020-08-20 PROCEDURE — 73721 MRI JNT OF LWR EXTRE W/O DYE: CPT | Mod: TC,RT

## 2020-09-25 ENCOUNTER — TELEPHONE (OUTPATIENT)
Dept: SPORTS MEDICINE | Facility: CLINIC | Age: 35
End: 2020-09-25

## 2020-09-25 NOTE — TELEPHONE ENCOUNTER
Spoke with patient's staff and left a message for him to return call to schedule surgery with Dr. Álvarez.

## 2020-09-25 NOTE — TELEPHONE ENCOUNTER
----- Message from Garett Dozier MA sent at 9/25/2020  3:07 PM CDT -----  Contact: Dr. Kerr@ [phone]    ----- Message -----  From: Cyndee Liu  Sent: 9/25/2020   2:58 PM CDT  To: Preeti Wong Staff    Pt calling to speak with someone regarding scheduling surgery. Please call.

## 2020-10-01 ENCOUNTER — TELEPHONE (OUTPATIENT)
Dept: SPORTS MEDICINE | Facility: CLINIC | Age: 35
End: 2020-10-01

## 2020-10-01 NOTE — TELEPHONE ENCOUNTER
----- Message from Garett Dozier MA sent at 9/25/2020  3:07 PM CDT -----  Contact: Dr. Kerr@ [phone]    ----- Message -----  From: Cyndee Liu  Sent: 9/25/2020   2:58 PM CDT  To: Preeti Wong Staff    Pt calling to speak with someone regarding scheduling surgery. Please call.

## 2020-10-05 ENCOUNTER — TELEPHONE (OUTPATIENT)
Dept: SPORTS MEDICINE | Facility: CLINIC | Age: 35
End: 2020-10-05

## 2020-10-05 DIAGNOSIS — M24.661 ARTHROFIBROSIS OF KNEE JOINT, RIGHT: Primary | ICD-10-CM

## 2020-10-05 DIAGNOSIS — Z01.818 PRE-OP TESTING: Primary | ICD-10-CM

## 2020-10-05 NOTE — TELEPHONE ENCOUNTER
Spoke with patient about scheduling surgery with Dr. Álvarez on 10/9/10, pre-op appointment with Lizette rodriguez on 10/6/20.

## 2020-10-06 ENCOUNTER — LAB VISIT (OUTPATIENT)
Dept: SPORTS MEDICINE | Facility: CLINIC | Age: 35
End: 2020-10-06
Payer: COMMERCIAL

## 2020-10-06 ENCOUNTER — TELEPHONE (OUTPATIENT)
Dept: SPORTS MEDICINE | Facility: CLINIC | Age: 35
End: 2020-10-06

## 2020-10-06 ENCOUNTER — OFFICE VISIT (OUTPATIENT)
Dept: SPORTS MEDICINE | Facility: CLINIC | Age: 35
End: 2020-10-06
Payer: COMMERCIAL

## 2020-10-06 VITALS — WEIGHT: 173.38 LBS | BODY MASS INDEX: 26.28 KG/M2 | HEIGHT: 68 IN

## 2020-10-06 DIAGNOSIS — M24.661 ARTHROFIBROSIS OF KNEE JOINT, RIGHT: Primary | ICD-10-CM

## 2020-10-06 DIAGNOSIS — Z01.818 PRE-OP TESTING: ICD-10-CM

## 2020-10-06 PROCEDURE — 99999 PR PBB SHADOW E&M-EST. PATIENT-LVL III: CPT | Mod: PBBFAC,,, | Performed by: PHYSICIAN ASSISTANT

## 2020-10-06 PROCEDURE — 99999 PR PBB SHADOW E&M-EST. PATIENT-LVL III: ICD-10-PCS | Mod: PBBFAC,,, | Performed by: PHYSICIAN ASSISTANT

## 2020-10-06 PROCEDURE — 99499 NO LOS: ICD-10-PCS | Mod: S$GLB,,, | Performed by: PHYSICIAN ASSISTANT

## 2020-10-06 PROCEDURE — 99499 UNLISTED E&M SERVICE: CPT | Mod: S$GLB,,, | Performed by: PHYSICIAN ASSISTANT

## 2020-10-06 PROCEDURE — U0003 INFECTIOUS AGENT DETECTION BY NUCLEIC ACID (DNA OR RNA); SEVERE ACUTE RESPIRATORY SYNDROME CORONAVIRUS 2 (SARS-COV-2) (CORONAVIRUS DISEASE [COVID-19]), AMPLIFIED PROBE TECHNIQUE, MAKING USE OF HIGH THROUGHPUT TECHNOLOGIES AS DESCRIBED BY CMS-2020-01-R: HCPCS

## 2020-10-06 RX ORDER — ONDANSETRON 4 MG/1
4 TABLET, FILM COATED ORAL EVERY 8 HOURS PRN
Qty: 21 TABLET | Refills: 0 | Status: SHIPPED | OUTPATIENT
Start: 2020-10-06 | End: 2020-10-16

## 2020-10-06 RX ORDER — OXYCODONE HYDROCHLORIDE 5 MG/1
5 TABLET ORAL EVERY 6 HOURS PRN
Qty: 28 TABLET | Refills: 0 | Status: SHIPPED | OUTPATIENT
Start: 2020-10-06 | End: 2020-10-16

## 2020-10-06 RX ORDER — ASPIRIN 81 MG/1
81 TABLET ORAL 2 TIMES DAILY
Qty: 28 TABLET | Refills: 0 | Status: SHIPPED | OUTPATIENT
Start: 2020-10-06 | End: 2020-10-23

## 2020-10-06 RX ORDER — SODIUM CHLORIDE 9 MG/ML
INJECTION, SOLUTION INTRAVENOUS CONTINUOUS
Status: CANCELLED | OUTPATIENT
Start: 2020-10-06

## 2020-10-06 NOTE — H&P (VIEW-ONLY)
Jovanni Kerr  is here for a completion of his perioperative paperwork. he  Is scheduled to undergo right knee arthroscopic lysis of adhesions with peripatellar releases, manipulation and any other indicated procedures on 10/16/2020.  He is a healthy individual and does not need clearance for this procedure.     Risks, indications and benefits of the surgical procedure were discussed with the patient. All questions with regard to surgery, rehab, expected return to functional activities, activities of daily living and recreational endeavors were answered to his satisfaction.    Discussed COVID-19 with the patient, they are aware of our current policies and procedures, were given the option of delaying surgery, and they elect to proceed.    Patient was informed and understands the risks of surgery are greater for patients with a current condition or hx of heart disease, obesity, clotting disorders, recurrent infections, steroid use, current or past smoking, and factors such as sedentary lifestyle and noncompliance with medications, therapy or f/u. The degree of the increased risk is hard to estimate w/ any degree of precision.    Once no other questions were asked, a brief history and physical exam was then performed.    PAST MEDICAL HISTORY: History reviewed. No pertinent past medical history.  PAST SURGICAL HISTORY:   Past Surgical History:   Procedure Laterality Date    KNEE ARTHROSCOPY W/ ACL RECONSTRUCTION Right 11/18/2019    Procedure: RECONSTRUCTION, KNEE, ACL, ARTHROSCOPIC;  Surgeon: JOSEF Álvarez MD;  Location: [facility];  Service: Orthopedics;  Laterality: Right;  REGIONAL W/CATHETER, ADDUCTOR     FAMILY HISTORY:   Family History   Problem Relation Age of Onset    Hypertension Mother     Stroke Mother 60    Hypothyroidism Mother     Hypertension Father     Tremor Father     Polycystic kidney disease Sister         transplant as kid     SOCIAL HISTORY:   Social History     Socioeconomic History     Marital status: Single     Spouse name: Not on file    Number of children: Not on file    Years of education: Not on file    Highest education level: Not on file   Occupational History    Occupation: Fellow     Comment: [employer]   Social Needs    Financial resource strain: Not on file    Food insecurity     Worry: Not on file     Inability: Not on file    Transportation needs     Medical: Not on file     Non-medical: Not on file   Tobacco Use    Smoking status: Never Smoker    Smokeless tobacco: Never Used   Substance and Sexual Activity    Alcohol use: Yes     Frequency: 2-4 times a month     Drinks per session: 1 or 2     Comment: occas    Drug use: Never    Sexual activity: Not on file   Lifestyle    Physical activity     Days per week: Not on file     Minutes per session: Not on file    Stress: Not on file   Relationships    Social connections     Talks on phone: Not on file     Gets together: Not on file     Attends Cheondoism service: Not on file     Active member of club or organization: Not on file     Attends meetings of clubs or organizations: Not on file     Relationship status: Not on file   Other Topics Concern    Not on file   Social History Narrative    Not on file       MEDICATIONS:   Current Outpatient Medications:     acetaminophen (TYLENOL) 325 MG tablet, Take 800 mg by mouth every 6 (six) hours as needed for Pain., Disp: , Rfl:     aspirin (ECOTRIN) 81 MG EC tablet, Take 1 tablet (81 mg total) by mouth 2 (two) times daily. for 14 days (Patient not taking: Reported on 10/6/2020), Disp: 28 tablet, Rfl: 0    chlorzoxazone (PARAFON FORTE) 500 mg Tab, Take 2 tablets by mouth three times daily (Patient not taking: Reported on 10/6/2020), Disp: 60 tablet, Rfl: 0    docusate sodium (COLACE) 100 MG capsule, Take 100 mg by mouth 2 (two) times daily., Disp: , Rfl:     dolutegravir (TIVICAY) 50 mg Tab, Take 1 tablet (50 mg total) by mouth once daily. (Patient not taking:  Reported on 2/18/2020), Disp: 3 tablet, Rfl: 0    emtricitabine (EMTRIVA) 200 mg Cap, Take 1 capsule (200 mg total) by mouth once daily. for 3 doses, Disp: 3 capsule, Rfl: 0    emtricitabine-tenofovir 200-300 mg (TRUVADA) 200-300 mg Tab, Take 1 tablet by mouth once daily. for 3 days (Patient not taking: Reported on 2/18/2020), Disp: 3 tablet, Rfl: 0    naproxen sodium (ANAPROX) 220 MG tablet, Take 220 mg by mouth every 12 (twelve) hours., Disp: , Rfl:     ondansetron (ZOFRAN) 4 MG tablet, Take 1 tablet (4 mg total) by mouth every 8 (eight) hours as needed for Nausea. (Patient not taking: Reported on 12/3/2019), Disp: 30 tablet, Rfl: 0    ondansetron (ZOFRAN) 4 MG tablet, Take 1 tablet (4 mg total) by mouth every 8 (eight) hours as needed. (Patient not taking: Reported on 10/6/2020), Disp: 21 tablet, Rfl: 0    ondansetron (ZOFRAN-ODT) 4 MG TbDL, Take 1 tablet (4 mg total) by mouth every 6 (six) hours as needed. (Patient not taking: Reported on 2/18/2020), Disp: 12 tablet, Rfl: 0    oxyCODONE (ROXICODONE) 5 MG immediate release tablet, Take 1 tablet (5 mg total) by mouth every 6 (six) hours as needed. (Patient not taking: Reported on 10/6/2020), Disp: 28 tablet, Rfl: 0    oxyCODONE-acetaminophen (PERCOCET) 5-325 mg per tablet, Take 1 tablet by mouth every 4 to 6 hours as needed. (Patient not taking: Reported on 12/3/2019), Disp: 28 tablet, Rfl: 0    raltegravir (ISENTRESS) 400 mg tablet, Take 1 tablet (400 mg total) by mouth 2 (two) times daily. for 3 days, Disp: 6 tablet, Rfl: 0    tenofovir (VIREAD) 300 mg Tab, Take 1 tablet (300 mg total) by mouth once daily. for 3 days, Disp: 3 tablet, Rfl: 0  ALLERGIES: Review of patient's allergies indicates:  No Known Allergies    Review of Systems   Constitution: Negative. Negative for chills, fever and night sweats.   HENT: Negative for congestion and headaches.    Eyes: Negative for blurred vision, left vision loss and right vision loss.   Cardiovascular: Negative  for chest pain and syncope.   Respiratory: Negative for cough and shortness of breath.    Endocrine: Negative for polydipsia, polyphagia and polyuria.   Hematologic/Lymphatic: Negative for bleeding problem. Does not bruise/bleed easily.   Skin: Negative for dry skin, itching and rash.   Musculoskeletal: Negative for falls and muscle weakness.   Gastrointestinal: Negative for abdominal pain and bowel incontinence.   Genitourinary: Negative for bladder incontinence and nocturia.   Neurological: Negative for disturbances in coordination, loss of balance and seizures.   Psychiatric/Behavioral: Negative for depression. The patient does not have insomnia.    Allergic/Immunologic: Negative for hives and persistent infections.     PHYSICAL EXAM:  GEN: A&Ox3, WD WN NAD  HEENT: WNL  CHEST: CTAB, no W/R/R  HEART: RRR, no M/R/G   ABD: Soft, NT ND, BS x4 QUADS  MS: Refer to previous note for detailed MS exam  NEURO: CN II-XII intact       The surgical consent was then reviewed with the patient, who agreed with all the contents of the consent form and it was signed.     PHYSICAL THERAPY:  He was also instructed regarding physical therapy and will begin on POD#1-3. He is doing physical therapy at [facility].    POST OP CARE: Instructions were reviewed including care of the wound and dressing after surgery and when he can shower.     PAIN MANAGEMENT: Jovanni Daviddebbi was instructed regarding the Polar ice unit that will be in place after surgery and his postoperative pain medications.     MEDICATION:  Roxicodone 5 mg 1-2 q 4 hours PRN for pain  Zofran 4 mg q 8 hours PRN for nausea and vomiting.  Aspirin 81mg BID x 2 weeks for DVT prophylaxis starting on the evening after surgery.      Post op meds to be delivered bedside prior to discharge. Deliver to family if patient is in surgery at 5pm.     Patient was instructed to purchase and take Colace to counter possible GI side effects of taking opiates.      DVT prophylaxis was discussed with the patient today including risk factors for developing DVTs and history of DVTs. The patient was asked if any specific recommendations were given from the doctor/s that did pre-operative surgical clearance.      If the patient was previously taking 81mg baby aspirin, they were told to not take additional baby aspirin, using the above stated aspirin and to restart the 81mg aspirin daily after completion of the aspirin dose.      Patient was also told to buy over the counter Prilosec medication and take it once daily for GI protection as long as they are taking NSAIDs or Aspirin.     The patient was told that narcotic pain medications may make them drowsy and instructions were given to not sign legal documents, drive or operate heavy machinery, cars, or equipment while under the influence of narcotic medications.     Dr. Álvarez was present in clinic during this pre-op evaluation. The patient was offered the opportunity to ask Dr. Álvarez any further questions regarding the procedure which may not have been addressed during their previous informed consent discussion. The patient has declined to see Dr. Álvarez today.    As there were no other questions to be asked, he was given my business card along with Dr. Álvarez's business card if he has any questions or concerns prior to surgery or in the postop period.     I explained to following and the patient expressed understanding:  The patient is currently aware of the COVID19 pandemic and that proceeding with their surgical procedure could potentially increase exposure to coronavirus in the community. The patient understands that there is the possibility of delayed or cancelled appts or PT visits in the future. They understand that infection with the coronavirus could complicate their surgery recovery. They are aware of the current policies and procedures of [facility] and the government regarding the pandemic and they were given the  option of delaying my surgery. The patient elects to proceed with surgery at this time.      Patient denies having any symptoms such as cough, SOB, fever, loss of taste/smell.     The patient has been scheduled to undergo coronavirus testing on the day prior to surgery.      The patient was instructed to practice strict social distancing, hand washing/hygiene, respiratory hygiene, and cough etiquette from now until 6 weeks following surgery to reduce the risk of carlos coronavirus.
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
## 2020-10-06 NOTE — H&P
Jovanni Kerr  is here for a completion of his perioperative paperwork. he  Is scheduled to undergo right knee arthroscopic lysis of adhesions with peripatellar releases, manipulation and any other indicated procedures on 10/16/2020.  He is a healthy individual and does not need clearance for this procedure.     Risks, indications and benefits of the surgical procedure were discussed with the patient. All questions with regard to surgery, rehab, expected return to functional activities, activities of daily living and recreational endeavors were answered to his satisfaction.    Discussed COVID-19 with the patient, they are aware of our current policies and procedures, were given the option of delaying surgery, and they elect to proceed.    Patient was informed and understands the risks of surgery are greater for patients with a current condition or hx of heart disease, obesity, clotting disorders, recurrent infections, steroid use, current or past smoking, and factors such as sedentary lifestyle and noncompliance with medications, therapy or f/u. The degree of the increased risk is hard to estimate w/ any degree of precision.    Once no other questions were asked, a brief history and physical exam was then performed.    PAST MEDICAL HISTORY: History reviewed. No pertinent past medical history.  PAST SURGICAL HISTORY:   Past Surgical History:   Procedure Laterality Date    KNEE ARTHROSCOPY W/ ACL RECONSTRUCTION Right 11/18/2019    Procedure: RECONSTRUCTION, KNEE, ACL, ARTHROSCOPIC;  Surgeon: JOSEF Álvarez MD;  Location: [facility];  Service: Orthopedics;  Laterality: Right;  REGIONAL W/CATHETER, ADDUCTOR     FAMILY HISTORY:   Family History   Problem Relation Age of Onset    Hypertension Mother     Stroke Mother 60    Hypothyroidism Mother     Hypertension Father     Tremor Father     Polycystic kidney disease Sister         transplant as kid     SOCIAL HISTORY:   Social History     Socioeconomic History     Marital status: Single     Spouse name: Not on file    Number of children: Not on file    Years of education: Not on file    Highest education level: Not on file   Occupational History    Occupation: Fellow     Comment: [employer]   Social Needs    Financial resource strain: Not on file    Food insecurity     Worry: Not on file     Inability: Not on file    Transportation needs     Medical: Not on file     Non-medical: Not on file   Tobacco Use    Smoking status: Never Smoker    Smokeless tobacco: Never Used   Substance and Sexual Activity    Alcohol use: Yes     Frequency: 2-4 times a month     Drinks per session: 1 or 2     Comment: occas    Drug use: Never    Sexual activity: Not on file   Lifestyle    Physical activity     Days per week: Not on file     Minutes per session: Not on file    Stress: Not on file   Relationships    Social connections     Talks on phone: Not on file     Gets together: Not on file     Attends Rastafarian service: Not on file     Active member of club or organization: Not on file     Attends meetings of clubs or organizations: Not on file     Relationship status: Not on file   Other Topics Concern    Not on file   Social History Narrative    Not on file       MEDICATIONS:   Current Outpatient Medications:     acetaminophen (TYLENOL) 325 MG tablet, Take 800 mg by mouth every 6 (six) hours as needed for Pain., Disp: , Rfl:     aspirin (ECOTRIN) 81 MG EC tablet, Take 1 tablet (81 mg total) by mouth 2 (two) times daily. for 14 days (Patient not taking: Reported on 10/6/2020), Disp: 28 tablet, Rfl: 0    chlorzoxazone (PARAFON FORTE) 500 mg Tab, Take 2 tablets by mouth three times daily (Patient not taking: Reported on 10/6/2020), Disp: 60 tablet, Rfl: 0    docusate sodium (COLACE) 100 MG capsule, Take 100 mg by mouth 2 (two) times daily., Disp: , Rfl:     dolutegravir (TIVICAY) 50 mg Tab, Take 1 tablet (50 mg total) by mouth once daily. (Patient not taking:  Reported on 2/18/2020), Disp: 3 tablet, Rfl: 0    emtricitabine (EMTRIVA) 200 mg Cap, Take 1 capsule (200 mg total) by mouth once daily. for 3 doses, Disp: 3 capsule, Rfl: 0    emtricitabine-tenofovir 200-300 mg (TRUVADA) 200-300 mg Tab, Take 1 tablet by mouth once daily. for 3 days (Patient not taking: Reported on 2/18/2020), Disp: 3 tablet, Rfl: 0    naproxen sodium (ANAPROX) 220 MG tablet, Take 220 mg by mouth every 12 (twelve) hours., Disp: , Rfl:     ondansetron (ZOFRAN) 4 MG tablet, Take 1 tablet (4 mg total) by mouth every 8 (eight) hours as needed for Nausea. (Patient not taking: Reported on 12/3/2019), Disp: 30 tablet, Rfl: 0    ondansetron (ZOFRAN) 4 MG tablet, Take 1 tablet (4 mg total) by mouth every 8 (eight) hours as needed. (Patient not taking: Reported on 10/6/2020), Disp: 21 tablet, Rfl: 0    ondansetron (ZOFRAN-ODT) 4 MG TbDL, Take 1 tablet (4 mg total) by mouth every 6 (six) hours as needed. (Patient not taking: Reported on 2/18/2020), Disp: 12 tablet, Rfl: 0    oxyCODONE (ROXICODONE) 5 MG immediate release tablet, Take 1 tablet (5 mg total) by mouth every 6 (six) hours as needed. (Patient not taking: Reported on 10/6/2020), Disp: 28 tablet, Rfl: 0    oxyCODONE-acetaminophen (PERCOCET) 5-325 mg per tablet, Take 1 tablet by mouth every 4 to 6 hours as needed. (Patient not taking: Reported on 12/3/2019), Disp: 28 tablet, Rfl: 0    raltegravir (ISENTRESS) 400 mg tablet, Take 1 tablet (400 mg total) by mouth 2 (two) times daily. for 3 days, Disp: 6 tablet, Rfl: 0    tenofovir (VIREAD) 300 mg Tab, Take 1 tablet (300 mg total) by mouth once daily. for 3 days, Disp: 3 tablet, Rfl: 0  ALLERGIES: Review of patient's allergies indicates:  No Known Allergies    Review of Systems   Constitution: Negative. Negative for chills, fever and night sweats.   HENT: Negative for congestion and headaches.    Eyes: Negative for blurred vision, left vision loss and right vision loss.   Cardiovascular: Negative  for chest pain and syncope.   Respiratory: Negative for cough and shortness of breath.    Endocrine: Negative for polydipsia, polyphagia and polyuria.   Hematologic/Lymphatic: Negative for bleeding problem. Does not bruise/bleed easily.   Skin: Negative for dry skin, itching and rash.   Musculoskeletal: Negative for falls and muscle weakness.   Gastrointestinal: Negative for abdominal pain and bowel incontinence.   Genitourinary: Negative for bladder incontinence and nocturia.   Neurological: Negative for disturbances in coordination, loss of balance and seizures.   Psychiatric/Behavioral: Negative for depression. The patient does not have insomnia.    Allergic/Immunologic: Negative for hives and persistent infections.     PHYSICAL EXAM:  GEN: A&Ox3, WD WN NAD  HEENT: WNL  CHEST: CTAB, no W/R/R  HEART: RRR, no M/R/G   ABD: Soft, NT ND, BS x4 QUADS  MS: Refer to previous note for detailed MS exam  NEURO: CN II-XII intact       The surgical consent was then reviewed with the patient, who agreed with all the contents of the consent form and it was signed.     PHYSICAL THERAPY:  He was also instructed regarding physical therapy and will begin on POD#1-3. He is doing physical therapy at [facility].    POST OP CARE: Instructions were reviewed including care of the wound and dressing after surgery and when he can shower.     PAIN MANAGEMENT: Jovanni Daviddebbi was instructed regarding the Polar ice unit that will be in place after surgery and his postoperative pain medications.     MEDICATION:  Roxicodone 5 mg 1-2 q 4 hours PRN for pain  Zofran 4 mg q 8 hours PRN for nausea and vomiting.  Aspirin 81mg BID x 2 weeks for DVT prophylaxis starting on the evening after surgery.      Post op meds to be delivered bedside prior to discharge. Deliver to family if patient is in surgery at 5pm.     Patient was instructed to purchase and take Colace to counter possible GI side effects of taking opiates.      DVT prophylaxis was discussed with the patient today including risk factors for developing DVTs and history of DVTs. The patient was asked if any specific recommendations were given from the doctor/s that did pre-operative surgical clearance.      If the patient was previously taking 81mg baby aspirin, they were told to not take additional baby aspirin, using the above stated aspirin and to restart the 81mg aspirin daily after completion of the aspirin dose.      Patient was also told to buy over the counter Prilosec medication and take it once daily for GI protection as long as they are taking NSAIDs or Aspirin.     The patient was told that narcotic pain medications may make them drowsy and instructions were given to not sign legal documents, drive or operate heavy machinery, cars, or equipment while under the influence of narcotic medications.     Dr. Álvarez was present in clinic during this pre-op evaluation. The patient was offered the opportunity to ask Dr. Álvarez any further questions regarding the procedure which may not have been addressed during their previous informed consent discussion. The patient has declined to see Dr. Álvarez today.    As there were no other questions to be asked, he was given my business card along with Dr. Álvarez's business card if he has any questions or concerns prior to surgery or in the postop period.     I explained to following and the patient expressed understanding:  The patient is currently aware of the COVID19 pandemic and that proceeding with their surgical procedure could potentially increase exposure to coronavirus in the community. The patient understands that there is the possibility of delayed or cancelled appts or PT visits in the future. They understand that infection with the coronavirus could complicate their surgery recovery. They are aware of the current policies and procedures of [facility] and the government regarding the pandemic and they were given the  option of delaying my surgery. The patient elects to proceed with surgery at this time.      Patient denies having any symptoms such as cough, SOB, fever, loss of taste/smell.     The patient has been scheduled to undergo coronavirus testing on the day prior to surgery.      The patient was instructed to practice strict social distancing, hand washing/hygiene, respiratory hygiene, and cough etiquette from now until 6 weeks following surgery to reduce the risk of carlos coronavirus.

## 2020-10-06 NOTE — TELEPHONE ENCOUNTER
----- Message from Marylu Ratliff sent at 10/6/2020  8:20 AM CDT -----  Pt is calling about his appt this morning asking for a call back.    Contact info [phone]

## 2020-10-06 NOTE — TELEPHONE ENCOUNTER
Informed patient the COVID testing is located in building B, and explained the 72 hour prior reasoning.

## 2020-10-06 NOTE — ANESTHESIA PAT ROS NOTE
10/06/2020  Jovanni Kerr is a 35 y.o., male.      Pre-op Assessment    I have reviewed the Patient Summary Reports.     I have reviewed the Nursing Notes.    I have reviewed the Medications.     Review of Systems  Anesthesia Hx:  No problems with previous Anesthesia Hx of Anesthetic complications Reports foot drop for several days - week or so S/P ACL surgery-completely resolved    Airway/Jaw/Neck: [id number]  Airway Findings: Mouth Opening: Normal Tongue: Normal  General Airway Assessment: Average  Mallampati: I  TM Distance: Normal, at least 6 cm  Jaw/Neck Findings:  Neck ROM: Normal ROM       Airway Device: LMA Airway Device Size: 4.5 Style: Cuffed Placement Verified By: Auscultation;Capnometry Breath Sounds: Equal Bilateral Insertion attempts (enter comment if more than 2 attempts): 1    Social:  Non-Smoker, Social Alcohol Use    Hematology/Oncology:  Hematology Normal   Oncology Normal     EENT/Dental:EENT/Dental Normal   Cardiovascular:   Exercise tolerance: good Denies CAD.     Denies Angina.    Pulmonary:  Pulmonary Normal  Denies Shortness of breath.  Denies Recent URI.    Renal/:  Renal/ Normal     Hepatic/GI:  Hepatic/GI Normal    Musculoskeletal:   H/o ACL   Neurological:  Neurology Normal    Endocrine:  Endocrine Normal    Dermatological:  Skin Normal    Psych:  Psychiatric Normal              Anesthesia Assessment: Preoperative EQUATION    Planned Procedure: Procedure(s) (LRB):  ARTHROSCOPY, KNEE with lysis of adhesions (Right)  Requested Anesthesia Type:General  Surgeon: JOSEF Álvarez MD  Service: Orthopedics  Known or anticipated Date of Surgery:10/9/2020    Surgeon notes: reviewed    Electronic QUestionnaire Assessment completed via nurse interview with patient.        Triage considerations:     The patient has no apparent active cardiac condition (No unstable coronary Syndrome such  as severe unstable angina or recent [<1 month] myocardial infarction, decompensated CHF, severe valvular   disease or significant arrhythmia)    Previous anesthesia records:LMA General, Nerve block for post-op pain and No problems      Subspecialty notes: Ortho, /sports medicine               Instructions given. (See in Nurse's note)    Optimization:      Patient  has previously scheduled Medical Appointment:    Navigation:                  Straight Line to surgery.               No tests, anesthesia preop clinic visit, or consult required.

## 2020-10-06 NOTE — PROGRESS NOTES
COVID Screening Specimen Collected    POSTIVE for the following symptoms:  None    NEGATIVE for the following symptoms:  Fever  Cough  Shortness of breath  Difficulty breathing  GI symptoms such as diarrhea or nausea  Loss of taste  Loss of smell    Patient was given:  1.Instructions for Patients Awaiting COVID-19 Test Results  2. CDC: What to do if you are sick with coronavirus disease 2019 (COVID-19)

## 2020-10-07 ENCOUNTER — ANESTHESIA EVENT (OUTPATIENT)
Dept: SURGERY | Facility: HOSPITAL | Age: 35
End: 2020-10-07
Payer: COMMERCIAL

## 2020-10-07 LAB — SARS-COV-2 RNA RESP QL NAA+PROBE: NOT DETECTED

## 2020-10-08 ENCOUNTER — TELEPHONE (OUTPATIENT)
Dept: SPORTS MEDICINE | Facility: CLINIC | Age: 35
End: 2020-10-08

## 2020-10-08 NOTE — TELEPHONE ENCOUNTER
Left VM for patient in regards to surgery arrival time (5AM) and location ([facility]) for surgery tomorrow. Advised to return call if he had any questions.

## 2020-10-09 ENCOUNTER — HOSPITAL ENCOUNTER (OUTPATIENT)
Facility: HOSPITAL | Age: 35
Discharge: HOME | End: 2020-10-09
Attending: ORTHOPAEDIC SURGERY | Admitting: ORTHOPAEDIC SURGERY
Payer: COMMERCIAL

## 2020-10-09 ENCOUNTER — ANESTHESIA (OUTPATIENT)
Dept: SURGERY | Facility: HOSPITAL | Age: 35
End: 2020-10-09
Payer: COMMERCIAL

## 2020-10-09 ENCOUNTER — TELEPHONE (OUTPATIENT)
Dept: SPORTS MEDICINE | Facility: CLINIC | Age: 35
End: 2020-10-09

## 2020-10-09 VITALS
HEART RATE: 67 BPM | SYSTOLIC BLOOD PRESSURE: 125 MMHG | BODY MASS INDEX: 26.22 KG/M2 | TEMPERATURE: 98 F | RESPIRATION RATE: 22 BRPM | DIASTOLIC BLOOD PRESSURE: 77 MMHG | HEIGHT: 68 IN | WEIGHT: 173 LBS | OXYGEN SATURATION: 100 %

## 2020-10-09 DIAGNOSIS — M24.661 ARTHROFIBROSIS OF KNEE JOINT, RIGHT: Primary | ICD-10-CM

## 2020-10-09 DIAGNOSIS — M24.661 ARTHROFIBROSIS OF KNEE JOINT, RIGHT: ICD-10-CM

## 2020-10-09 DIAGNOSIS — M25.561 ACUTE PAIN OF RIGHT KNEE: Primary | ICD-10-CM

## 2020-10-09 PROCEDURE — 29884 PR KNEE SCOPE,LYSIS OF ADHESNS: ICD-10-PCS | Mod: RT,,, | Performed by: ORTHOPAEDIC SURGERY

## 2020-10-09 PROCEDURE — D9220A PRA ANESTHESIA: ICD-10-PCS | Mod: ANES,,, | Performed by: ANESTHESIOLOGY

## 2020-10-09 PROCEDURE — 27200750 HC INSULATED NEEDLE/ STIMUPLEX: Performed by: ANESTHESIOLOGY

## 2020-10-09 PROCEDURE — 25000003 PHARM REV CODE 250: Performed by: ORTHOPAEDIC SURGERY

## 2020-10-09 PROCEDURE — 29884 ARTHRS KNEE SURG LYSIS ADS: CPT | Mod: RT,,, | Performed by: ORTHOPAEDIC SURGERY

## 2020-10-09 PROCEDURE — 63600175 PHARM REV CODE 636 W HCPCS: Performed by: STUDENT IN AN ORGANIZED HEALTH CARE EDUCATION/TRAINING PROGRAM

## 2020-10-09 PROCEDURE — 94770 HC EXHALED C02 TEST: CPT

## 2020-10-09 PROCEDURE — 64447 NJX AA&/STRD FEMORAL NRV IMG: CPT | Mod: 59,RT,, | Performed by: ANESTHESIOLOGY

## 2020-10-09 PROCEDURE — 76942 ECHO GUIDE FOR BIOPSY: CPT | Mod: 26,,, | Performed by: ANESTHESIOLOGY

## 2020-10-09 PROCEDURE — 25000003 PHARM REV CODE 250: Performed by: NURSE ANESTHETIST, CERTIFIED REGISTERED

## 2020-10-09 PROCEDURE — 63600175 PHARM REV CODE 636 W HCPCS: Performed by: ORTHOPAEDIC SURGERY

## 2020-10-09 PROCEDURE — 27201423 OPTIME MED/SURG SUP & DEVICES STERILE SUPPLY: Performed by: ORTHOPAEDIC SURGERY

## 2020-10-09 PROCEDURE — 25000003 PHARM REV CODE 250: Performed by: PHYSICIAN ASSISTANT

## 2020-10-09 PROCEDURE — 64447 NJX AA&/STRD FEMORAL NRV IMG: CPT | Performed by: STUDENT IN AN ORGANIZED HEALTH CARE EDUCATION/TRAINING PROGRAM

## 2020-10-09 PROCEDURE — 76942 PR U/S GUIDANCE FOR NEEDLE GUIDANCE: ICD-10-PCS | Mod: 26,,, | Performed by: ANESTHESIOLOGY

## 2020-10-09 PROCEDURE — 99900035 HC TECH TIME PER 15 MIN (STAT)

## 2020-10-09 PROCEDURE — 25000003 PHARM REV CODE 250: Performed by: ANESTHESIOLOGY

## 2020-10-09 PROCEDURE — 36000711: Performed by: ORTHOPAEDIC SURGERY

## 2020-10-09 PROCEDURE — S0028 INJECTION, FAMOTIDINE, 20 MG: HCPCS | Performed by: NURSE ANESTHETIST, CERTIFIED REGISTERED

## 2020-10-09 PROCEDURE — 37000008 HC ANESTHESIA 1ST 15 MINUTES: Performed by: ORTHOPAEDIC SURGERY

## 2020-10-09 PROCEDURE — 27100025 HC TUBING, SET FLUID WARMER: Performed by: ANESTHESIOLOGY

## 2020-10-09 PROCEDURE — D9220A PRA ANESTHESIA: ICD-10-PCS | Mod: CRNA,,, | Performed by: NURSE ANESTHETIST, CERTIFIED REGISTERED

## 2020-10-09 PROCEDURE — 25000003 PHARM REV CODE 250: Performed by: STUDENT IN AN ORGANIZED HEALTH CARE EDUCATION/TRAINING PROGRAM

## 2020-10-09 PROCEDURE — 27200651 HC AIRWAY, LMA: Performed by: ANESTHESIOLOGY

## 2020-10-09 PROCEDURE — 71000039 HC RECOVERY, EACH ADD'L HOUR: Performed by: ORTHOPAEDIC SURGERY

## 2020-10-09 PROCEDURE — 63600175 PHARM REV CODE 636 W HCPCS: Performed by: PHYSICIAN ASSISTANT

## 2020-10-09 PROCEDURE — 37000009 HC ANESTHESIA EA ADD 15 MINS: Performed by: ORTHOPAEDIC SURGERY

## 2020-10-09 PROCEDURE — 36000710: Performed by: ORTHOPAEDIC SURGERY

## 2020-10-09 PROCEDURE — 63600175 PHARM REV CODE 636 W HCPCS: Performed by: ANESTHESIOLOGY

## 2020-10-09 PROCEDURE — D9220A PRA ANESTHESIA: Mod: ANES,,, | Performed by: ANESTHESIOLOGY

## 2020-10-09 PROCEDURE — 71000015 HC POSTOP RECOV 1ST HR: Performed by: ORTHOPAEDIC SURGERY

## 2020-10-09 PROCEDURE — 94761 N-INVAS EAR/PLS OXIMETRY MLT: CPT

## 2020-10-09 PROCEDURE — 63600175 PHARM REV CODE 636 W HCPCS: Performed by: NURSE ANESTHETIST, CERTIFIED REGISTERED

## 2020-10-09 PROCEDURE — 64447 PR NERVE BLOCK INJ, ANES/STEROID, FEMORAL, INCL IMAG GUIDANCE: ICD-10-PCS | Mod: 59,RT,, | Performed by: ANESTHESIOLOGY

## 2020-10-09 PROCEDURE — 71000033 HC RECOVERY, INTIAL HOUR: Performed by: ORTHOPAEDIC SURGERY

## 2020-10-09 PROCEDURE — D9220A PRA ANESTHESIA: Mod: CRNA,,, | Performed by: NURSE ANESTHETIST, CERTIFIED REGISTERED

## 2020-10-09 RX ORDER — CHLORZOXAZONE 500 MG/1
500 TABLET ORAL 3 TIMES DAILY
Qty: 21 TABLET | Refills: 0 | Status: SHIPPED | OUTPATIENT
Start: 2020-10-09 | End: 2020-10-19

## 2020-10-09 RX ORDER — ROPIVACAINE HYDROCHLORIDE 5 MG/ML
INJECTION, SOLUTION EPIDURAL; INFILTRATION; PERINEURAL
Status: DISCONTINUED
Start: 2020-10-09 | End: 2020-10-09 | Stop reason: HOSPADM

## 2020-10-09 RX ORDER — FENTANYL CITRATE 50 UG/ML
INJECTION, SOLUTION INTRAMUSCULAR; INTRAVENOUS
Status: DISCONTINUED | OUTPATIENT
Start: 2020-10-09 | End: 2020-10-09

## 2020-10-09 RX ORDER — MIDAZOLAM HYDROCHLORIDE 1 MG/ML
INJECTION, SOLUTION INTRAMUSCULAR; INTRAVENOUS
Status: DISCONTINUED | OUTPATIENT
Start: 2020-10-09 | End: 2020-10-09

## 2020-10-09 RX ORDER — MIDAZOLAM HYDROCHLORIDE 1 MG/ML
0.5 INJECTION INTRAMUSCULAR; INTRAVENOUS
Status: DISCONTINUED | OUTPATIENT
Start: 2020-10-09 | End: 2021-04-20

## 2020-10-09 RX ORDER — FAMOTIDINE 10 MG/ML
INJECTION INTRAVENOUS
Status: DISCONTINUED | OUTPATIENT
Start: 2020-10-09 | End: 2020-10-09

## 2020-10-09 RX ORDER — KETAMINE HCL IN 0.9 % NACL 50 MG/5 ML
SYRINGE (ML) INTRAVENOUS
Status: DISCONTINUED | OUTPATIENT
Start: 2020-10-09 | End: 2020-10-09

## 2020-10-09 RX ORDER — FENTANYL CITRATE 50 UG/ML
25 INJECTION, SOLUTION INTRAMUSCULAR; INTRAVENOUS EVERY 5 MIN PRN
Status: COMPLETED | OUTPATIENT
Start: 2020-10-09 | End: 2020-10-09

## 2020-10-09 RX ORDER — SODIUM CHLORIDE 9 MG/ML
INJECTION, SOLUTION INTRAVENOUS CONTINUOUS
Status: DISCONTINUED | OUTPATIENT
Start: 2020-10-09 | End: 2020-10-09 | Stop reason: HOSPADM

## 2020-10-09 RX ORDER — SODIUM CHLORIDE 0.9 % (FLUSH) 0.9 %
10 SYRINGE (ML) INJECTION
Status: DISCONTINUED | OUTPATIENT
Start: 2020-10-09 | End: 2020-10-09 | Stop reason: HOSPADM

## 2020-10-09 RX ORDER — LIDOCAINE HYDROCHLORIDE 20 MG/ML
INJECTION INTRAVENOUS
Status: DISCONTINUED | OUTPATIENT
Start: 2020-10-09 | End: 2020-10-09

## 2020-10-09 RX ORDER — OXYCODONE HYDROCHLORIDE 5 MG/1
10 TABLET ORAL ONCE
Status: COMPLETED | OUTPATIENT
Start: 2020-10-09 | End: 2020-10-09

## 2020-10-09 RX ORDER — CEFAZOLIN SODIUM 1 G/3ML
2 INJECTION, POWDER, FOR SOLUTION INTRAMUSCULAR; INTRAVENOUS
Status: DISCONTINUED | OUTPATIENT
Start: 2020-10-09 | End: 2020-10-09 | Stop reason: HOSPADM

## 2020-10-09 RX ORDER — METHOCARBAMOL 500 MG/1
1000 TABLET, FILM COATED ORAL ONCE
Status: COMPLETED | OUTPATIENT
Start: 2020-10-09 | End: 2020-10-09

## 2020-10-09 RX ORDER — ACETAMINOPHEN 500 MG
1000 TABLET ORAL ONCE
Status: COMPLETED | OUTPATIENT
Start: 2020-10-09 | End: 2020-10-09

## 2020-10-09 RX ORDER — TRANEXAMIC ACID 100 MG/ML
INJECTION, SOLUTION INTRAVENOUS
Status: DISCONTINUED | OUTPATIENT
Start: 2020-10-09 | End: 2020-10-09

## 2020-10-09 RX ORDER — ONDANSETRON 2 MG/ML
INJECTION INTRAMUSCULAR; INTRAVENOUS
Status: DISCONTINUED | OUTPATIENT
Start: 2020-10-09 | End: 2020-10-09

## 2020-10-09 RX ORDER — ACETAMINOPHEN 500 MG
1000 TABLET ORAL ONCE
Status: DISCONTINUED | OUTPATIENT
Start: 2020-10-09 | End: 2020-10-09

## 2020-10-09 RX ORDER — EPINEPHRINE 1 MG/ML
INJECTION INTRAMUSCULAR; INTRAVENOUS; SUBCUTANEOUS
Status: DISCONTINUED | OUTPATIENT
Start: 2020-10-09 | End: 2020-10-09 | Stop reason: HOSPADM

## 2020-10-09 RX ORDER — OXYCODONE HYDROCHLORIDE 5 MG/1
5 TABLET ORAL
Status: DISCONTINUED | OUTPATIENT
Start: 2020-10-09 | End: 2020-10-09 | Stop reason: HOSPADM

## 2020-10-09 RX ORDER — CELECOXIB 200 MG/1
400 CAPSULE ORAL ONCE
Status: COMPLETED | OUTPATIENT
Start: 2020-10-09 | End: 2020-10-09

## 2020-10-09 RX ORDER — ONDANSETRON 2 MG/ML
4 INJECTION INTRAMUSCULAR; INTRAVENOUS DAILY PRN
Status: DISCONTINUED | OUTPATIENT
Start: 2020-10-09 | End: 2020-10-09 | Stop reason: HOSPADM

## 2020-10-09 RX ORDER — FENTANYL CITRATE 50 UG/ML
25 INJECTION, SOLUTION INTRAMUSCULAR; INTRAVENOUS EVERY 5 MIN PRN
Status: DISCONTINUED | OUTPATIENT
Start: 2020-10-09 | End: 2021-04-20

## 2020-10-09 RX ORDER — PROPOFOL 10 MG/ML
VIAL (ML) INTRAVENOUS
Status: DISCONTINUED | OUTPATIENT
Start: 2020-10-09 | End: 2020-10-09

## 2020-10-09 RX ADMIN — FENTANYL CITRATE 25 MCG: 50 INJECTION INTRAMUSCULAR; INTRAVENOUS at 09:10

## 2020-10-09 RX ADMIN — CELECOXIB 400 MG: 200 CAPSULE ORAL at 06:10

## 2020-10-09 RX ADMIN — ESMOLOL HYDROCHLORIDE 20 MG: 10 INJECTION INTRAVENOUS at 07:10

## 2020-10-09 RX ADMIN — FENTANYL CITRATE 25 MCG: 50 INJECTION, SOLUTION INTRAMUSCULAR; INTRAVENOUS at 08:10

## 2020-10-09 RX ADMIN — MIDAZOLAM 2 MG: 1 INJECTION INTRAMUSCULAR; INTRAVENOUS at 06:10

## 2020-10-09 RX ADMIN — OXYCODONE 5 MG: 5 TABLET ORAL at 09:10

## 2020-10-09 RX ADMIN — FENTANYL CITRATE 50 MCG: 50 INJECTION INTRAMUSCULAR; INTRAVENOUS at 06:10

## 2020-10-09 RX ADMIN — Medication 10 MG: at 08:10

## 2020-10-09 RX ADMIN — MIDAZOLAM HYDROCHLORIDE 0.5 MG: 1 INJECTION, SOLUTION INTRAMUSCULAR; INTRAVENOUS at 07:10

## 2020-10-09 RX ADMIN — OXYCODONE 10 MG: 5 TABLET ORAL at 10:10

## 2020-10-09 RX ADMIN — ESMOLOL HYDROCHLORIDE 10 MG: 10 INJECTION INTRAVENOUS at 08:10

## 2020-10-09 RX ADMIN — PROPOFOL 200 MG: 10 INJECTION, EMULSION INTRAVENOUS at 07:10

## 2020-10-09 RX ADMIN — TRANEXAMIC ACID 1000 MG: 100 INJECTION, SOLUTION INTRAVENOUS at 07:10

## 2020-10-09 RX ADMIN — FENTANYL CITRATE 50 MCG: 50 INJECTION, SOLUTION INTRAMUSCULAR; INTRAVENOUS at 07:10

## 2020-10-09 RX ADMIN — ACETAMINOPHEN 1000 MG: 500 TABLET ORAL at 06:10

## 2020-10-09 RX ADMIN — Medication 20 MG: at 07:10

## 2020-10-09 RX ADMIN — METHOCARBAMOL TABLETS 1000 MG: 500 TABLET, COATED ORAL at 09:10

## 2020-10-09 RX ADMIN — SODIUM CHLORIDE: 0.9 INJECTION, SOLUTION INTRAVENOUS at 06:10

## 2020-10-09 RX ADMIN — ONDANSETRON 4 MG: 2 INJECTION, SOLUTION INTRAMUSCULAR; INTRAVENOUS at 07:10

## 2020-10-09 RX ADMIN — CEFAZOLIN 2 G: 330 INJECTION, POWDER, FOR SOLUTION INTRAMUSCULAR; INTRAVENOUS at 07:10

## 2020-10-09 RX ADMIN — SODIUM CHLORIDE, SODIUM GLUCONATE, SODIUM ACETATE, POTASSIUM CHLORIDE, MAGNESIUM CHLORIDE, SODIUM PHOSPHATE, DIBASIC, AND POTASSIUM PHOSPHATE: .53; .5; .37; .037; .03; .012; .00082 INJECTION, SOLUTION INTRAVENOUS at 07:10

## 2020-10-09 RX ADMIN — ESMOLOL HYDROCHLORIDE 10 MG: 10 INJECTION INTRAVENOUS at 07:10

## 2020-10-09 RX ADMIN — FAMOTIDINE 20 MG: 10 INJECTION, SOLUTION INTRAVENOUS at 07:10

## 2020-10-09 RX ADMIN — LIDOCAINE HYDROCHLORIDE 100 MG: 20 INJECTION, SOLUTION INTRAVENOUS at 07:10

## 2020-10-09 NOTE — PLAN OF CARE
Pts friend updated on pt status, verbalized understanding. Pt declined to have friend come back at this time.

## 2020-10-09 NOTE — ANESTHESIA PREPROCEDURE EVALUATION
10/09/2020  Jovanni Kerr is a 35 y.o., male.    Procedure Summary    Case: [id number] Date/Time: 10/09/20 0700   Procedure: ARTHROSCOPY, KNEE with lysis of adhesions (Right Knee) - ARTHROSCOPY, KNEE with lysis of adhesions   regional with catheter- adductor   Anesthesia type: General   Diagnosis: Arthrofibrosis of knee joint, right [M24.661]     Patient Active Problem List   Diagnosis    ACL tear    Acute pain of right knee    S/P ACL reconstruction    Arthrofibrosis of knee joint, right     Past Surgical History:   Procedure Laterality Date    KNEE ARTHROSCOPY W/ ACL RECONSTRUCTION Right 11/18/2019    Procedure: RECONSTRUCTION, KNEE, ACL, ARTHROSCOPIC;  Surgeon: JOSEF Álvarez MD;  Location: [facility];  Service: Orthopedics;  Laterality: Right;  REGIONAL W/CATHETER, ADDUCTOR     Current Discharge Medication List      CONTINUE these medications which have NOT CHANGED    Details   acetaminophen (TYLENOL) 325 MG tablet Take 800 mg by mouth every 6 (six) hours as needed for Pain.      aspirin (ECOTRIN) 81 MG EC tablet Take 1 tablet (81 mg total) by mouth 2 (two) times daily. for 14 days  Qty: 28 tablet, Refills: 0    Comments: Post op meds to be delivered bedside prior to discharge. Deliver to family if patient is in surgery at 5pm. [place]  Associated Diagnoses: Arthrofibrosis of knee joint, right      chlorzoxazone (PARAFON FORTE) 500 mg Tab Take 2 tablets by mouth three times daily  Qty: 60 tablet, Refills: 0    Associated Diagnoses: Right knee pain, unspecified chronicity      docusate sodium (COLACE) 100 MG capsule Take 100 mg by mouth 2 (two) times daily.      dolutegravir (TIVICAY) 50 mg Tab Take 1 tablet (50 mg total) by mouth once daily.  Qty: 3 tablet, Refills: 0      emtricitabine-tenofovir 200-300 mg (TRUVADA) 200-300 mg Tab Take 1 tablet by mouth once daily. for 3 days  Qty: 3 tablet, Refills:  0      naproxen sodium (ANAPROX) 220 MG tablet Take 220 mg by mouth every 12 (twelve) hours.      !! ondansetron (ZOFRAN) 4 MG tablet Take 1 tablet (4 mg total) by mouth every 8 (eight) hours as needed for Nausea.  Qty: 30 tablet, Refills: 0      !! ondansetron (ZOFRAN) 4 MG tablet Take 1 tablet (4 mg total) by mouth every 8 (eight) hours as needed.  Qty: 21 tablet, Refills: 0    Comments: Post op meds to be delivered bedside prior to discharge. Deliver to family if patient is in surgery at 5pm. [place]  Associated Diagnoses: Arthrofibrosis of knee joint, right      ondansetron (ZOFRAN-ODT) 4 MG TbDL Take 1 tablet (4 mg total) by mouth every 6 (six) hours as needed.  Qty: 12 tablet, Refills: 0      oxyCODONE (ROXICODONE) 5 MG immediate release tablet Take 1 tablet (5 mg total) by mouth every 6 (six) hours as needed.  Qty: 28 tablet, Refills: 0    Comments: Quantity prescribed more than 7 day supply? No. Post op meds to be delivered bedside prior to discharge. Deliver to family if patient is in surgery at 5pm. [place]  Associated Diagnoses: Arthrofibrosis of knee joint, right      oxyCODONE-acetaminophen (PERCOCET) 5-325 mg per tablet Take 1 tablet by mouth every 4 to 6 hours as needed.  Qty: 28 tablet, Refills: 0    Comments: Quantity prescribed more than 7 day supply? No  Associated Diagnoses: S/P ACL reconstruction       !! - Potential duplicate medications found. Please discuss with provider.          Pre-op Assessment    I have reviewed the Patient Summary Reports.     I have reviewed the Nursing Notes.       Review of Systems  Anesthesia Hx:  No previous Anesthesia  Neg history of prior surgery. Denies Family Hx of Anesthesia complications.   Denies Personal Hx of Anesthesia complications.   Social:  Social Alcohol Use, Non-Smoker  Denies Tobacco Use.   EENT/Dental:   Denies ear symptoms  Denies Nasal Symptoms.  Denies Active Dental Problems    Cardiovascular:  Cardiovascular Normal   Functional Capacity 4 METS   Denies Cardiomyopathy  Denies Deep Venous Thrombosis (DVT)  Denies Carotoid Artery Disease    Pulmonary:  Denies Asthma.    Renal/:  Denies Renal Symptoms/Infections/Stones    Hepatic/GI:  Denies Hepatic/GI Symptoms    Musculoskeletal:  Musculoskeletal Normal  Denies Bone Disorder    Neurological:  Neurology Normal  Denies Seizure Disorder  Denies Head Injury  Denies Spinal Cord Injury Denies Nerve Injury   Endocrine:  Denies Diabetes  Denies Thyroid Disease   Denies Pituitary Disease    Dermatological:  Denies Skin Symptoms/Problems   Psych:  Denies Attention Deficit Disorder .         Physical Exam  General:  Well nourished    Airway/Jaw/Neck:  Airway Findings: Mouth Opening: Normal Tongue: Normal  General Airway Assessment: Average  Mallampati: I  TM Distance: Normal, at least 6 cm  Jaw/Neck Findings:  Neck ROM: Normal ROM            Mental Status:  Mental Status Findings:  Alert and Oriented         Anesthesia Plan  Type of Anesthesia, risks & benefits discussed:  Anesthesia Type:  general, MAC, regional  Patient's Preference:   Intra-op Monitoring Plan: standard ASA monitors  Intra-op Monitoring Plan Comments:   Post Op Pain Control Plan: multimodal analgesia, peripheral nerve block and per primary service following discharge from PACU  Post Op Pain Control Plan Comments:   Induction:   IV  Beta Blocker:  Patient is not currently on a Beta-Blocker (No further documentation required).       Informed Consent: Patient understands risks and agrees with Anesthesia plan.  Questions answered. Anesthesia consent signed with patient.  ASA Score: 1     Day of Surgery Review of History & Physical:    H&P update referred to the surgeon.         Ready For Surgery From Anesthesia Perspective.

## 2020-10-09 NOTE — BRIEF OP NOTE
[facility]  Brief Operative Note    Surgery Date: 10/9/2020     Surgeon(s) and Role:     * JOSEF Álvarez MD - Primary    Assisting Surgeon: None    Pre-op Diagnosis:  Arthrofibrosis of knee joint, right [M24.661]    Post-op Diagnosis:  Post-Op Diagnosis Codes:     * Arthrofibrosis of knee joint, right [M24.661]    Procedure(s) (LRB):  ARTHROSCOPY, KNEE with lysis of adhesions, notchplasty (Right)    Anesthesia: General    Description of the findings of the procedure(s): Arthrofibrosis of knee joint, right [M24.661]    Estimated Blood Loss: * No values recorded between 10/9/2020  7:27 AM and 10/9/2020  8:56 AM *         Specimens:   Specimen (12h ago, onward)    None            Discharge Note    OUTCOME: Patient tolerated treatment/procedure well without complication and is now ready for discharge.    DISPOSITION: Home or Self Care    FINAL DIAGNOSIS:  Arthrofibrosis of knee joint, right [M24.661]    FOLLOWUP: In clinic    DISCHARGE INSTRUCTIONS:  No discharge procedures on file.

## 2020-10-09 NOTE — ANESTHESIA PROCEDURE NOTES
Right Adductor Canal Single Injection Block    Patient location during procedure: pre-op   Block not for primary anesthetic.  Reason for block: at surgeon's request and post-op pain management   Post-op Pain Location: Right knee pain  Start time: 10/9/2020 6:41 AM  Timeout: 10/9/2020 6:40 AM   End time: 10/9/2020 6:47 AM    Staffing  Authorizing Provider: Yessenia Chiang MD  Performing Provider: Melinda Yu MD    Preanesthetic Checklist  Completed: patient identified, site marked, surgical consent, pre-op evaluation, timeout performed, IV checked, risks and benefits discussed and monitors and equipment checked  Peripheral Block  Patient position: supine  Prep: ChloraPrep  Patient monitoring: heart rate, cardiac monitor, continuous pulse ox, continuous capnometry and frequent blood pressure checks  Block type: adductor canal  Laterality: right  Injection technique: single shot  Needle  Needle type: Stimuplex   Needle gauge: 21 G  Needle length: 4 in  Needle localization: anatomical landmarks and ultrasound guidance   -ultrasound image captured on disc.  Assessment  Injection assessment: negative aspiration, negative parasthesia and local visualized surrounding nerve  Paresthesia pain: none  Heart rate change: no  Slow fractionated injection: yes  Additional Notes  VSS.  [facility] RN monitoring vitals throughout procedure.  Patient tolerated procedure well.  Right single shot adductor canal block performed. 15 cc's of 0.25% ropivacaine + epi injected.

## 2020-10-09 NOTE — PLAN OF CARE
Pt continues to c/o 7/10 thigh pain. Dr. Chiang at bedside to assess pt, new orders placed for 10 mg oxycodone once.

## 2020-10-09 NOTE — ANESTHESIA PROCEDURE NOTES
Intubation  Performed by: Silvina Solorzano CRNA  Authorized by: Yessenia Chiang MD     Intubation:     Induction:  Intravenous    Intubated:  Postinduction    Mask Ventilation:  N/a    Attempts:  1    Attempted By:  CRNA    Difficult Airway Encountered?: No      Complications:  None    Airway Device:  Supraglottic airway/LMA    Airway Device Size:  4.5    Style/Cuff Inflation:  Cuffed    Inflation Amount (mL):  0    Secured at:  The lips    Placement Verified By:  Capnometry    Complicating Factors:  None    Findings Post-Intubation:  BS equal bilateral

## 2020-10-09 NOTE — OP NOTE
?[facility]   OPERATIVE REPORT   ORTHOPAEDIC SURGERY   PROVIDER: DR. TIMO MCGEE    PATIENT INFORMATION   Jovanni Kerr 35 y.o. male 1985   MRN: [id number]   LOCATION: [facility]     DATE OF PROCEDURE: 10/9/2020     PREOPERATIVE DIAGNOSES:   Right knee arthrofibrosis     POSTOPERATIVE DIAGNOSES:   Right knee arthrofibrosis  Right knee patellofemoral chondromalacia, grade 2     PROCEDURES PERFORMED:   Right knee arthroscopic lysis of adhesions with manipulation under anesthesia (CPT 75109)  Right knee arthroscopic chondroplasty (CPT 52722)     Surgeon(s) and Role:     * JOSEF Mcgee MD - Primary     * Zac Mcneal DO - Fellow     * XU Marks     ANESTHESIA: General with adductor block and local anesthetic injection (SARITA)     ESTIMATED BLOOD LOSS: 15 cc     IMPLANTS: * No implants in log *      FINDINGS:  Significant anterior interval adhesions with notch impingement in terminal extension.  Peripatellar adhesions present with correlating with reduced patellar mobility preoperatively.  Intact ACL graft without attenuation. Intact menisci. The patient did have small areas of abrasive wear to the patellofemoral articular cartilage from the surrounding scar and adhesions consistent with grade 2 chondromalacia.  No high-grade focal chondral defects.     SPECIMENS:  None     COMPLICATIONS: None.      INTRAOPERATIVE COUNTS: Correct.      PROPHYLACTIC IV ANTIBIOTICS: Given per [facility] Protocol.     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  The postoperative course was complicated by difficulty getting the patient into physical therapy routinely. He also had a rapid return to full duty at work right after surgery which I think was a contributing factor towards his loss of extension. Unfortunately the patient developed some stiffness which we have  battled since his surgery.  Initially he had full passive extension right after surgery but developed his contracture over time.  He has also had some stiffness in flexion.  He was previously indicated for lysis of adhesions but he has had difficulty finding time to get this scheduled around his work.  He now presents for the above-stated procedure. Full informed consent was obtained.  Of note the patient has denied any upper respiratory symptoms and has tested negative for COVID-19.     DETAILS OF PROCEDURE: Jovanni was met in the preoperative holding area.  All final questions were answered. The operative site was identified and marked.  An adductor block was performed per the anesthesia team.  The patient was then brought back to the operating room and placed supine.  General anesthesia was administered.  Examination under anesthesia of the right knee demonstrated a negative Lachman and negative pivot shift.  Stable to varus and valgus stress.  The patient had slight hyperextension of the left knee passively with 5-10 degrees short of full neutral extension on the operative side.  Flexion was tight to 130 degrees with decreased patellar mobility.  A tourniquet was placed on the right knee but was not utilized during this case.  The right lower extremity was then prepped and draped in usual sterile fashion for arthroscopic knee surgery.     A verbal time-out was performed.  Of note, the patient was given 1 g of tranexamic acid intravenously to limit blood loss and improve visualization during the procedure.     The previous anteromedial arthroscopic portal was reopened.  A new anterolateral arthroscopic portal was created. The arthroscope was introduced.  Initial diagnostic arthroscopy was difficult given the extensive amount of scar around the patellar tendon and anterior interval upon injury.  There was significantly decreased patellar mobility and significant peripatellar adhesions extending into the  suprapatellar recess.  There was also small areas of grade 2 chondromalacia of the patella from the medial facet, central eminence, and lateral facet.  Mild grade 2 changes with some grooving of the central trochlea as well.  The medial and lateral compartments were intact.  Within the intercondylar space, there was no abrasive wear or apparent irritation of the ACL graft.  The graft was pristine and intact to probing.  The notch was found to be narrow and stenotic.  Moreover there was thickened anterior interval scar present extending anterior to the inner meniscal ligament again with reduced patellar mobility.  Care was taken to preserve the ACL graft which was beneath the anterior interval adhesions. A combination of the shaver and biters was used to take out the anterior interval scar and adhesion. A limited notchplasty was performed around the intact graft.  The arthroscopic ablator was used to perform the anterior interval release anterior to the intact inner meniscal ligament to remove scar down to the tibial plateau.  Adhesions were taken down extending into the medial and lateral gutters.  There was some osseous fragmentation deep to the patellar tendon anterior to the tibial plateau which was removed as well.  This completely decompress the anterior interval.   Care was taken throughout the case to maintain hemostasis.  Visualization in the medial and lateral gutters, of note demonstrated no loose bodies or visible fixators.  There was some abrasive wear over the anterior aspect of the lateral femoral condyle related to impinging scar.  This resulted in grade 2 chondromalacia which was treated with chondroplasty using the shaver.  In the same fashion, a chondroplasty was performed of the medial femoral condyle and the patella.  Within the suprapatellar recess accessory superior medial and superior lateral portals were created to gain access to the space.  Suprapatellar recess adhesions were taken down to  decompress the patella.  Improved patellar mobility was noted thereafter.     At the conclusion of the case, the patient demonstrated full neutral to slight hyper extension passively.  Much improved patellar mobility was noted.  Flexion was noted to 140°.  It was determined that no posterior capsular release was needed. At the conclusion of the case, repeat exam again demonstrated a negative Lachman and a negative pivot shift. No drain was required.  The incisions were closed using 3-0 nylon in standard fashion.  Sterile dressings were applied after local anesthetic injection was performed.  A thigh-high Matti hose was applied for compression over the knee.  A knee cooling sleeve was applied as well. The patient was then extubated and transferred to the postanesthesia care unit in stable condition.     POSTOPERATIVE PLAN:   The patient did not have his previous T scope brace with him. We discussed locking the knee in terminal hyperextension during the day and at night when he gets home. The patient will begin rehab again on Monday.  Focus on maintaining his terminal extension and limiting swelling.  Plan to discontinue the brace and crutches as quadriceps function allows. He will likely need to assistance of his dynamic extension brace as well to maintain his extension.

## 2020-10-09 NOTE — ANESTHESIA POSTPROCEDURE EVALUATION
Anesthesia Post Evaluation    Patient: Jovanni Kerr    Procedure(s) Performed: Procedure(s) (LRB):  ARTHROSCOPY, KNEE with lysis of adhesions, notchplasty (Right)    Final Anesthesia Type: general    Patient location during evaluation: PACU  Patient participation: Yes- Able to Participate  Level of consciousness: awake and alert and oriented  Post-procedure vital signs: reviewed and stable  Pain management: adequate  Airway patency: patent    PONV status at discharge: No PONV  Anesthetic complications: no      Cardiovascular status: hemodynamically stable  Respiratory status: nasal cannula  Hydration status: euvolemic  Follow-up not needed.          Vitals Value Taken Time   /77 10/09/20 1101   Temp 36.5 °C (97.7 °F) 10/09/20 0900   Pulse 65 10/09/20 1105   Resp 20 10/09/20 1105   SpO2 100 % 10/09/20 1105   Vitals shown include unvalidated device data.      Event Time   Out of Recovery 10:30:00         Pain/Dorota Score: Pain Rating Prior to Med Admin: 7 (10/9/2020 10:57 AM)  Pain Rating Post Med Admin: 7 (10/9/2020 10:57 AM)  Dorota Score: 9 (10/9/2020  8:57 AM)

## 2020-10-09 NOTE — TELEPHONE ENCOUNTER
Spoke with patient . He is doing well after his surgery today. He was instructed to use his ACL brace and lock his knee in 10 degrees of hyperextension. He said he will locate his brace from his previous ACL surgery and apply it to his surgical knee in 10 degrees of hyperextension. Patient understood. He will obtain a new brace from Tony Boss at his PT appointment on Monday 10/12/2020. If he cannot find his ACL brace, he will call the clinic and be fit for a new brace today.    Thank you,    Justin Buchanan, Ms, OTC,   Clinical Assistant to Dr. Álvarez

## 2020-10-09 NOTE — DISCHARGE INSTRUCTIONS
[employer] CARE CUBE COLD THERAPY SYSTEM    The Polar Care Cube Cold Therapy System is simple and reliable. It is easy to use, compact design makes it great for home use. With the addition of ice and water, you will enjoy 6-8 hours of effortless cold therapy. Proper use requires an insulation barrier between the pad and the patient's skin.  Instructions on how to use the Polar Care Cube Cold Therapy System Below:

## 2020-10-09 NOTE — PLAN OF CARE
Pre-op complete. Waiting for H&P update, site david, and surgery consent. No visitors present. Will lock of belongings when patient rolls to OR. Patient states he will need crutches for discharge.

## 2020-10-09 NOTE — BRIEF OP NOTE
"[facility]  Brief Operative Note    Surgery Date: 10/9/2020     Surgeon(s) and Role:     * JOSEF Álvarez MD - Primary    Assisting Surgeon: None    Pre-op Diagnosis:  Arthrofibrosis of knee joint, right [M24.661]    Post-op Diagnosis:  Post-Op Diagnosis Codes:     * Arthrofibrosis of knee joint, right [M24.661]    Procedure(s) (LRB):  ARTHROSCOPY, KNEE with lysis of adhesions, notchplasty (Right)    Anesthesia: General    Description of the findings of the procedure(s): Arthrofibrosis of knee joint, right [M24.661]      Estimated Blood Loss: * No values recorded between 10/9/2020  7:27 AM and 10/9/2020  8:56 AM *         Specimens:   Specimen (12h ago, onward)    None            Discharge Note    OUTCOME: Patient tolerated treatment/procedure well without complication and is now ready for discharge.    DISPOSITION: Home or Self Care    FINAL DIAGNOSIS:  <principal problem not specified>    FOLLOWUP: In clinic    DISCHARGE INSTRUCTIONS:    Discharge Procedure Orders   CRUTCHES FOR HOME USE     Order Specific Question Answer Comments   Type: Axillary    Height: 5' 8" (1.727 m)    Weight: 78.5 kg (173 lb)    Length of need (1-99 months): 99      Diet Adult Regular     Ice to affected area     Notify your health care provider if you experience any of the following:  temperature >100.4     Notify your health care provider if you experience any of the following:  persistent nausea and vomiting or diarrhea     Notify your health care provider if you experience any of the following:  severe uncontrolled pain     Notify your health care provider if you experience any of the following:  redness, tenderness, or signs of infection (pain, swelling, redness, odor or green/yellow discharge around incision site)      Remove dressing in 72 hours     "

## 2020-10-09 NOTE — INTERVAL H&P NOTE
The patient has been examined and the H&P has been reviewed:    I concur with the findings and no changes have occurred since H&P was written.    Surgery risks, benefits and alternative options discussed and understood by patient/family.          Active Hospital Problems    Diagnosis  POA    Arthrofibrosis of knee joint, right [M24.151]  Yes      Resolved Hospital Problems   No resolved problems to display.

## 2020-10-09 NOTE — TRANSFER OF CARE
"Anesthesia Transfer of Care Note    Patient: Jovanni Kerr    Procedure(s) Performed: Procedure(s) (LRB):  ARTHROSCOPY, KNEE with lysis of adhesions, notchplasty (Right)    Patient location: PACU    Anesthesia Type: general    Transport from OR: Transported from OR on room air with adequate spontaneous ventilation    Post pain: adequate analgesia    Post assessment: no apparent anesthetic complications and tolerated procedure well    Post vital signs: stable    Level of consciousness: sedated and responds to stimulation    Nausea/Vomiting: no nausea/vomiting    Complications: none    Transfer of care protocol was followed      Last vitals:   Visit Vitals  /70 (BP Location: Right arm, Patient Position: Lying)   Pulse 80   Temp 36.7 °C (98.1 °F) (Oral)   Resp 18   Ht 5' 8" (1.727 m)   Wt 78.5 kg (173 lb)   SpO2 97%   BMI 26.30 kg/m²     "

## 2020-10-12 ENCOUNTER — TELEPHONE (OUTPATIENT)
Dept: SPORTS MEDICINE | Facility: CLINIC | Age: 35
End: 2020-10-12

## 2020-10-12 ENCOUNTER — CLINICAL SUPPORT (OUTPATIENT)
Dept: REHABILITATION | Facility: HOSPITAL | Age: 35
End: 2020-10-12
Payer: COMMERCIAL

## 2020-10-12 DIAGNOSIS — R29.898 WEAKNESS OF RIGHT LOWER EXTREMITY: ICD-10-CM

## 2020-10-12 DIAGNOSIS — M25.661 STIFFNESS OF RIGHT KNEE: ICD-10-CM

## 2020-10-12 DIAGNOSIS — M24.661 ARTHROFIBROSIS OF KNEE JOINT, RIGHT: ICD-10-CM

## 2020-10-12 PROCEDURE — 97110 THERAPEUTIC EXERCISES: CPT

## 2020-10-12 PROCEDURE — 97161 PT EVAL LOW COMPLEX 20 MIN: CPT

## 2020-10-12 NOTE — PLAN OF CARE
[facility]  Physical Therapy Initial Evaluation    Date: 10/12/2020   Name: Jovanni Kerr  Clinic Number: [id number]    Therapy Diagnosis:   Encounter Diagnoses   Name Primary?    Arthrofibrosis of knee joint, right     Stiffness of right knee     Weakness of right lower extremity      Physician: Elvis Boss, PA*    Physician Orders: PT Eval and Treat   Medical Diagnosis from Referral: M24.661 (ICD-10-CM) - Arthrofibrosis of knee joint, right  Evaluation Date: 10/12/2020  Authorization Period Expiration: 12/31/2020  Plan of Care Expiration: 4/31/2020  Visit # / Visits authorized: 1/ 30    Time In: 5:00 pm  Time Out: 5:45 pm  Total Appointment Time (timed & untimed codes): 45 minutes    Precautions: Standard    Subjective   Date of onset: 10/9/2020   History of current condition - Jovanni reports: ACL reconstruction last November, with difficulty gaining range into extension and flexion. He got an GILBERT on Friday. Since then, he has been in the brace and iced it some. Pain has not been bad. He has been doing a lot of quad sets, but has not been doing any flexion. He is still going to work, but is on a rotation where he doesn't have to  the OR as much.      Medical History:   No past medical history on file.    Surgical History:   Jovanni Kerr  has a past surgical history that includes Knee arthroscopy w/ ACL reconstruction (Right, 11/18/2019); Arthroscopy of knee (Right, 10/9/2020); and Arthroscopic chondroplasty of knee joint (Right, 10/9/2020).    Medications:   Jovanni has a current medication list which includes the following prescription(s): acetaminophen, aspirin, chlorzoxazone, docusate sodium, dolutegravir, emtricitabine-tenofovir 200-300 mg, ondansetron, and oxycodone, and the following Facility-Administered Medications: fentanyl, midazolam, and ropivacaine.    Allergies:   Review of patient's allergies indicates:  No Known Allergies     Imaging, MRI studies:  "1. Status post ACL reconstruction.  Mucoid degeneration of the ACL graft without partial or full-thickness tear.  2. Moderate patellar tendinosis with evolving postoperative changes of BTB graft harvesting site.  3. Suspected thickening/scarring of the medial plica.    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)   Prior Level of Function: I with all ADLs and recreational activities  Current Level of Function: difficulty with work, ADLs, recreational activities    Pain:  Current 0/10, worst 4/10, best 0/10   Location: anterior R knee  Description: Aching and Dull  Aggravating Factors: Standing and Walking  Easing Factors: rest    Patients goals: full strength, full range of motion, sports stuff that he was doing 2 years ago- basketball, frisbee    Objective     Observation: no acute distress; pt enters with locked hinge brace    Range of Motion:   Knee Left active Left Passive Right Active R passive   Flexion 135 140 87 100   Extension 5 5 0 2     Isolated strength testing not performed, but able to perform SLR without lag    Joint Mobility: decreased patellar and fat pad mobility    Palpation: no tenderness to palpation    Sensation: no sensation deficits    Edema: mild edema present      Limitation/Restriction for FOTO Knee Survey    Therapist reviewed FOTO scores for Jovanni Kerr on 10/12/2020.   FOTO documents entered into [employer] - see Media section.    Limitation Score: 48%         TREATMENT   Treatment Time In: 5:30 pm  Treatment Time Out: 5:45 pm  Total Treatment time (time-based codes) separate from Evaluation: 15 minutes    Jovanni received therapeutic exercises to develop ROM and flexibility for 15 minutes including:  Self stick massage  Seated R knee flexion with L leg assisting   SLR x10  Knee hyperextension hinge mob 10x5"    Home Exercises and Patient Education Provided    Education provided: "   - pathophysiology, importance of HEP adherence; knee extension and quad strength importance    Written Home Exercises Provided: yes.  Exercises were reviewed and Jovanni was able to demonstrate them prior to the end of the session.  Jovanni demonstrated good  understanding of the education provided.     See EMR under Patient Instructions for exercises provided 10/12/2020.    Assessment   Jovanni is a 35 y.o. male referred to outpatient Physical Therapy with a medical diagnosis of Arthrofibrosis of knee joint, right. Patient presents with decreased knee range of motion and joint mobility, decreased strength, gait abnormality, and decreased tolerance for functional mobility. He would benefit from skilled PT services to improve strength and mobility to return patient to his prior level of function.     Patient prognosis is Fair.   Patientt will benefit from skilled outpatient Physical Therapy to address the deficits stated above and in the chart below, provide patient /family education, and to maximize patientt's level of independence.     Plan of care discussed with patient: Yes  Patient's spiritual, cultural and educational needs considered and patient is agreeable to the plan of care and goals as stated below:     Anticipated Barriers for therapy: COVID, schedule, previous failure of PT, hx of poor adherence    Medical Necessity is demonstrated by the following  History  Co-morbidities and personal factors that may impact the plan of care Co-morbidities:   none    Personal Factors:   no deficits     low   Examination  Body Structures and Functions, activity limitations and participation restrictions that may impact the plan of care Body Regions:   lower extremities    Body Systems:    gross symmetry  ROM  strength  gross coordinated movement  balance  gait  transfers  transitions  motor control  motor learning  edema    Participation Restrictions:   Unable to perform recreational activities    Activity  limitations:   Learning and applying knowledge  no deficits    General Tasks and Commands  no deficits    Communication  no deficits    Mobility  walking    Self care  no deficits    Domestic Life  shopping  cooking  doing house work (cleaning house, washing dishes, laundry)    Interactions/Relationships  no deficits    Life Areas  employment    Community and Social Life  community life  recreation and leisure         moderate   Clinical Presentation stable and uncomplicated low   Decision Making/ Complexity Score: low     Goals:  Short Term Goals: 6 weeks   - amb 1 mile on level tile without pain provocation or need for rest  - do SLS >30 sec without LOB or pain  - do lateral step down of 6  without pain provocation and with adequate single leg mechanics for progression to higher level activities  - pt will demonstrate full knee extension and knee flexion within 5 degrees compared to contralateral side for improved mobility      Long Term Goals: 6 months  - pt will be able to perform multidirectional running activities for return to prior level of function  - pt will demonstrate LSI within 95% with quadriceps and hamstring strength for decreased reinjury rate  - pt will demonstrate LSI within 95% with hop testing for decreased reinjury risk    Plan   Plan of care Certification: 10/12/2020 to 4/31/2020.    Outpatient Physical Therapy 1-2 times weekly for 6 months to include the following interventions: Gait Training, Manual Therapy, Moist Heat/ Ice, Neuromuscular Re-ed, Patient Education, Therapeutic Activites and Therapeutic Exercise.     Rashmi Faust, PT

## 2020-10-12 NOTE — TELEPHONE ENCOUNTER
Left VM for patient to arrive about 15 minutes early before 5PM PT appointment to get knee brace. Advised to return call if he had any questions.

## 2020-10-14 ENCOUNTER — CLINICAL SUPPORT (OUTPATIENT)
Dept: REHABILITATION | Facility: HOSPITAL | Age: 35
End: 2020-10-14
Payer: COMMERCIAL

## 2020-10-14 DIAGNOSIS — M25.661 STIFFNESS OF RIGHT KNEE: ICD-10-CM

## 2020-10-14 DIAGNOSIS — R29.898 WEAKNESS OF RIGHT LOWER EXTREMITY: ICD-10-CM

## 2020-10-14 PROCEDURE — 97112 NEUROMUSCULAR REEDUCATION: CPT

## 2020-10-14 PROCEDURE — 97110 THERAPEUTIC EXERCISES: CPT

## 2020-10-14 PROCEDURE — 97140 MANUAL THERAPY 1/> REGIONS: CPT

## 2020-10-14 NOTE — PROGRESS NOTES
"  Physical Therapy Daily Treatment Note     Name: Jovanni Kerr  Clinic Number: [id number]    Therapy Diagnosis:   Encounter Diagnoses   Name Primary?    Stiffness of right knee     Weakness of right lower extremity      Physician: Elvis Boss PA*    Visit Date: 10/14/2020  Physician Orders: PT Eval and Treat   Medical Diagnosis from Referral: M24.661 (ICD-10-CM) - Arthrofibrosis of knee joint, right  Evaluation Date: 10/12/2020  Authorization Period Expiration: 12/31/2020  Plan of Care Expiration: 4/31/2020  Visit # / Visits authorized: 2/ 30    Time In: 5:47 pm  Time Out: 6:40 pm  Total Billable Time: 53 minutes    Precautions: Standard    Subjective     Pt reports: he has been doing well overall, with some pain and tightness. He is worried about swelling, as he feels that is what set him back last time.   He was compliant with home exercise program.  Response to previous treatment: no adverse effects  Functional change: none yet    Pain: 3/10  Location: right knee      Objective   ROM: 0/0/105    Jovanni received therapeutic exercises to develop strength, endurance, ROM and flexibility for 25 minutes including:  Quad set in hyperextension 10x10"  SAQ with AAROM 20x5"  Standing TKE green theraband 10x5"  Standing TKE with calf raise 2x10  Upright bike x5 min for joint normalization and decreased edema    Jovanni received the following manual therapy techniques: Joint mobilizations were applied to the: R knee for 10 minutes, including:  Hinged knee extension mobilization  Patellar and fat pad mobilizations    Jovanni participated in neuromuscular re-education activities to improve: Balance, Coordination, Kinesthetic, Sense and Proprioception for 18 minutes. The following activities were included:  Retro walking with sportcord x1 lap  Russel stepping x4 laps  Gait training without AD      Home Exercises Provided and Patient Education Provided     Education provided:   - pathophysiology, importance of HEP, " biking, edema control, dynasplint adherence for outcomes    Written Home Exercises Provided: Patient instructed to cont prior HEP.  Exercises were reviewed and Jovanni was able to demonstrate them prior to the end of the session.  Jovanni demonstrated good  understanding of the education provided.     See EMR under Patient Instructions for exercises provided prior visit.    Assessment     Jovanni tolerated exercises well with maintenance of terminal extension with quad exercises. Unable to reach hyperextension at this time. Gait improved following exercises with cueing required for knee flexion during swing phase. Educated pt on the importance of HEP adherence, as well as decreasing edema during work periods.   Jovanni is progressing well towards his goals.   Pt prognosis is Fair.     Pt will continue to benefit from skilled outpatient physical therapy to address the deficits listed in the problem list box on initial evaluation, provide pt/family education and to maximize pt's level of independence in the home and community environment.     Pt's spiritual, cultural and educational needs considered and pt agreeable to plan of care and goals.    Anticipated barriers to physical therapy: COVID, schedule, previous failure of PT, hx of poor adherence    Goals:   Short Term Goals: 6 weeks   - amb 1 mile on level tile without pain provocation or need for rest (progressing, not met)  - do SLS >30 sec without LOB or pain (progressing, not met)  - do lateral step down of 6  without pain provocation and with  adequate single leg mechanics for progression to higher level activities (progressing, not met)  - pt will demonstrate full knee extension and knee flexion within 5 degrees compared to contralateral side for improved mobility (progressing, not met)        Long Term Goals: 6 months  - pt will be able to perform multidirectional running activities for return to prior level of function (progressing, not met)  - pt will  demonstrate LSI within 95% with quadriceps and hamstring strength for decreased reinjury rate (progressing, not met)  - pt will demonstrate LSI within 95% with hop testing for decreased reinjury risk (progressing, not met)    Plan     Continue per POC, focusing on extension range of motion and quad activation, and progressing as tolerated.     Rashmi Faust, PT

## 2020-10-19 ENCOUNTER — CLINICAL SUPPORT (OUTPATIENT)
Dept: REHABILITATION | Facility: HOSPITAL | Age: 35
End: 2020-10-19
Payer: COMMERCIAL

## 2020-10-19 DIAGNOSIS — M25.661 STIFFNESS OF RIGHT KNEE: ICD-10-CM

## 2020-10-19 DIAGNOSIS — R29.898 WEAKNESS OF RIGHT LOWER EXTREMITY: ICD-10-CM

## 2020-10-19 PROCEDURE — 97140 MANUAL THERAPY 1/> REGIONS: CPT

## 2020-10-19 PROCEDURE — 97110 THERAPEUTIC EXERCISES: CPT

## 2020-10-19 NOTE — PROGRESS NOTES
"  Physical Therapy Daily Treatment Note     Name: Jovanni Kerr  Clinic Number: [id number]    Therapy Diagnosis:   Encounter Diagnoses   Name Primary?    Stiffness of right knee     Weakness of right lower extremity      Physician: Elvis oBss PA*    Visit Date: 10/19/2020  Physician Orders: PT Eval and Treat   Medical Diagnosis from Referral: M24.661 (ICD-10-CM) - Arthrofibrosis of knee joint, right  Evaluation Date: 10/12/2020  Authorization Period Expiration: 12/31/2020  Plan of Care Expiration: 4/31/2020  Visit # / Visits authorized: 2/ 30    Time In: 5:47 pm  Time Out: 6:30 pm  Total Billable Time: 22 minutes    Precautions: Standard    Subjective     Pt reports: he has been working on his dynasplint and his exercises. His compression brace helps, but his knee wants to swell as soon as he takes it off.   He was compliant with home exercise program.  Response to previous treatment: no adverse effects  Functional change: none yet    Pain: 3/10  Location: right knee      Objective   ROM: 0/0/105    Jovanni received therapeutic exercises to develop strength, endurance, ROM and flexibility for 33 minutes including:  Quad set in hyperextension 10x10"  Seated knee flexion with overpressure x5 min  Prone quad stretch with capsular distraction 3x45 sec  Shuttle squat 87.5# 4x10  Upright bike x10 min for joint normalization and decreased edema    Jovanni received the following manual therapy techniques: Joint mobilizations were applied to the: R knee for 10 minutes, including:  Hinged knee extension mobilization  Patellar and fat pad mobilizations    Jovanni participated in neuromuscular re-education activities to improve: Balance, Coordination, Kinesthetic, Sense and Proprioception for 00 minutes. The following activities were included:      Home Exercises Provided and Patient Education Provided     Education provided:   - pathophysiology, importance of HEP, biking, edema control, dynasplint adherence for " outcomes    Written Home Exercises Provided: Patient instructed to cont prior HEP.  Exercises were reviewed and Jovanni was able to demonstrate them prior to the end of the session.  Jovanni demonstrated good  understanding of the education provided.     See EMR under Patient Instructions for exercises provided prior visit.    Assessment     Jovanni tolerated exercises well. Significant quad length deficits, with decreased knee flexion in prone compared to off table. Pt ambulated in with improved gait, but throughout session occasionally requires cueing for normalized knee flexion.   Jovanni is progressing well towards his goals.   Pt prognosis is Fair.     Pt will continue to benefit from skilled outpatient physical therapy to address the deficits listed in the problem list box on initial evaluation, provide pt/family education and to maximize pt's level of independence in the home and community environment.     Pt's spiritual, cultural and educational needs considered and pt agreeable to plan of care and goals.    Anticipated barriers to physical therapy: COVID, schedule, previous failure of PT, hx of poor adherence    Goals:   Short Term Goals: 6 weeks   - amb 1 mile on level tile without pain provocation or need for rest (progressing, not met)  - do SLS >30 sec without LOB or pain (progressing, not met)  - do lateral step down of 6  without pain provocation and with  adequate single leg mechanics for progression to higher level activities (progressing, not met)  - pt will demonstrate full knee extension and knee flexion within 5 degrees compared to contralateral side for improved mobility (progressing, not met)        Long Term Goals: 6 months  - pt will be able to perform multidirectional running activities for return to prior level of function (progressing, not met)  - pt will demonstrate LSI within 95% with quadriceps and hamstring strength for decreased reinjury rate (progressing, not met)  - pt will  demonstrate LSI within 95% with hop testing for decreased reinjury risk (progressing, not met)    Plan     Continue per POC, focusing on extension range of motion and quad activation, and progressing as tolerated.     Rashmi Faust, PT

## 2020-10-21 ENCOUNTER — CLINICAL SUPPORT (OUTPATIENT)
Dept: REHABILITATION | Facility: HOSPITAL | Age: 35
End: 2020-10-21
Payer: COMMERCIAL

## 2020-10-21 DIAGNOSIS — R29.898 WEAKNESS OF RIGHT LOWER EXTREMITY: ICD-10-CM

## 2020-10-21 DIAGNOSIS — M25.661 STIFFNESS OF RIGHT KNEE: ICD-10-CM

## 2020-10-21 PROCEDURE — 97110 THERAPEUTIC EXERCISES: CPT

## 2020-10-21 PROCEDURE — 97140 MANUAL THERAPY 1/> REGIONS: CPT

## 2020-10-21 NOTE — PROGRESS NOTES
"    Physical Therapy Daily Treatment Note     Name: Jovanni Kerr  Clinic Number: [id number]    Therapy Diagnosis:   Encounter Diagnoses   Name Primary?    Stiffness of right knee     Weakness of right lower extremity      Physician: Elvis Boss PA*    Visit Date: 10/21/2020  Physician Orders: PT Eval and Treat   Medical Diagnosis from Referral: M24.661 (ICD-10-CM) - Arthrofibrosis of knee joint, right  Evaluation Date: 10/12/2020  Authorization Period Expiration: 12/31/2020  Plan of Care Expiration: 4/31/2020  Visit # / Visits authorized: 4/ 30    Time In: 4:30pm (arrived 15 mins late)  Time Out: 5:15pm  Total Billable Time: 23 minutes    Precautions: Standard    Subjective     Pt reports: His R knee is swollen. One of his incisions had some drainage when he took his bandage off and he feels like it may be infected. XU Marks, was not in clinic, so XU Beaver, was alerted, was present during portion of treatment session and educated pt to clean incision with an alcohol wipe and place a bandaid over it. PT wiped down incision with alcohol during session and applied bandaid.     He was compliant with home exercise program.  Response to previous treatment: no adverse effects  Functional change: none yet    Pain: 3/10  Location: right knee      Objective   ROM: 0/0/100    Jovanni received therapeutic exercises to develop strength, endurance, ROM and flexibility for 35 minutes including:   Upright bike x10 min for joint normalization and decreased edema  Quad set in hyperextension 2x10 10"  R knee extension self mobilization with gait belt around foot 3x10 3"  Seated knee flexion with overpressure and cupping x5 min    Jovanni received the following manual therapy techniques: Joint mobilizations were applied to the: R knee for 10 minutes, including:  Patellar and fat pad mobilizations    Jovanni participated in neuromuscular re-education activities to improve: Balance, Coordination, Kinesthetic, " Sense and Proprioception for 00 minutes. The following activities were included:      Home Exercises Provided and Patient Education Provided     Education provided:   - pathophysiology, importance of HEP, dynasplint adherence for outcomes    Written Home Exercises Provided: Patient instructed to cont prior HEP.  Exercises were reviewed and Jovanni was able to demonstrate them prior to the end of the session.  Jovanni demonstrated good  understanding of the education provided.     See EMR under Patient Instructions for exercises provided prior visit.    Assessment     Jovanni continues to demonstrate significant R quad length deficits which are slightly improved after knee flexion with overpressure and cupping. R knee extension is improved after self knee extension mobilization and quad sets in hyperextension. He will continue to benefit from PT to improve R knee ROM, strength and function to return to full capacity at work and complete ADLs independently.     Jovanni is progressing well towards his goals.   Pt prognosis is Fair.     Pt will continue to benefit from skilled outpatient physical therapy to address the deficits listed in the problem list box on initial evaluation, provide pt/family education and to maximize pt's level of independence in the home and community environment.     Pt's spiritual, cultural and educational needs considered and pt agreeable to plan of care and goals.    Anticipated barriers to physical therapy: COVID, schedule, previous failure of PT, hx of poor adherence    Goals:   Short Term Goals: 6 weeks   - amb 1 mile on level tile without pain provocation or need for rest (progressing, not met)  - do SLS >30 sec without LOB or pain (progressing, not met)  - do lateral step down of 6  without pain provocation and with  adequate single leg mechanics for progression to higher level activities (progressing, not met)  - pt will demonstrate full knee extension and knee flexion within 5  degrees compared to contralateral side for improved mobility (progressing, not met)        Long Term Goals: 6 months  - pt will be able to perform multidirectional running activities for return to prior level of function (progressing, not met)  - pt will demonstrate LSI within 95% with quadriceps and hamstring strength for decreased reinjury rate (progressing, not met)  - pt will demonstrate LSI within 95% with hop testing for decreased reinjury risk (progressing, not met)    Plan     Continue per POC, focusing on extension range of motion and quad activation, and progressing as tolerated.     Reuben Pierson, SPT    Rashmi Faust, PT     I certify that I was present in the room directing the student in service delivery and guiding them using my skilled judgement. As the co-signing therapist, I have reviewed the students documentation and am responsible for the treatment, assessment, and plan.

## 2020-10-22 ENCOUNTER — OFFICE VISIT (OUTPATIENT)
Dept: SPORTS MEDICINE | Facility: CLINIC | Age: 35
End: 2020-10-22
Payer: COMMERCIAL

## 2020-10-22 ENCOUNTER — TELEPHONE (OUTPATIENT)
Dept: SPORTS MEDICINE | Facility: CLINIC | Age: 35
End: 2020-10-22

## 2020-10-22 VITALS
BODY MASS INDEX: 25.42 KG/M2 | DIASTOLIC BLOOD PRESSURE: 78 MMHG | HEART RATE: 67 BPM | HEIGHT: 69 IN | WEIGHT: 171.63 LBS | SYSTOLIC BLOOD PRESSURE: 114 MMHG

## 2020-10-22 DIAGNOSIS — Z98.890 S/P ACL RECONSTRUCTION: ICD-10-CM

## 2020-10-22 DIAGNOSIS — Z47.89 SURGICAL AFTERCARE, MUSCULOSKELETAL SYSTEM: Primary | ICD-10-CM

## 2020-10-22 PROCEDURE — 99024 PR POST-OP FOLLOW-UP VISIT: ICD-10-PCS | Mod: S$GLB,,, | Performed by: PHYSICIAN ASSISTANT

## 2020-10-22 PROCEDURE — 99999 PR PBB SHADOW E&M-EST. PATIENT-LVL III: ICD-10-PCS | Mod: PBBFAC,,, | Performed by: PHYSICIAN ASSISTANT

## 2020-10-22 PROCEDURE — 99024 POSTOP FOLLOW-UP VISIT: CPT | Mod: S$GLB,,, | Performed by: PHYSICIAN ASSISTANT

## 2020-10-22 PROCEDURE — 99999 PR PBB SHADOW E&M-EST. PATIENT-LVL III: CPT | Mod: PBBFAC,,, | Performed by: PHYSICIAN ASSISTANT

## 2020-10-22 RX ORDER — METHYLPREDNISOLONE 4 MG/1
TABLET ORAL
Qty: 1 PACKAGE | Refills: 0 | Status: SHIPPED | OUTPATIENT
Start: 2020-10-22 | End: 2020-11-12

## 2020-10-22 NOTE — PROGRESS NOTES
S:Jovanni Kerr presents for post-operative evaluation.     DATE OF PROCEDURE: 10/9/2020       PROCEDURES PERFORMED:   Right knee arthroscopic lysis of adhesions with manipulation under anesthesia (CPT 72874)  Right knee arthroscopic chondroplasty (CPT 39213)     Surgeon(s) and Role:     * JOSEF Álvarez MD - Primary     * Zac Mcneal DO - Fellow     * XU Marks    DATE OF PROCEDURE: 11/18/2019       PROCEDURES PERFORMED:   Right knee arthroscopic bone patellar tendon bone autograft ACL reconstruction (CPT 55467)  Right knee arthroscopic lateral plica band resection (CPT 90544)  Right knee arthroscopic partial lateral release (CPT 18109)     Surgeon(s) and Role:     * JOSEF Álvarez MD - Primary     * Jon Zendejas MD - Fellow     * SMA Priyanka    Jovanni Kerr reports to be doing well 2wk s/p lysis of adhesions and chondroplasty, 11 months s/p ACL reconstruction. Denies fevers, chills, night sweats, chest pain, difficulty breathing, calf pain or tenderness. Going to PT 2xWeek at the [facility]. Seeing good progress daily. Pain levels are improving. Has d/c'd pain medication.  He states that he still feels slight discomfort in his knee along with some stiffness as well.  Due to his occupation he is not able to rest as often as he would like.  Overall he feels like he is doing well.    O: The incisions are healing well.  No signs of infection.  Sutures were removed. No significant pain or unusual tenderness.  Active range of motion clinic today:  Patient is able to obtain approximately 5° short of full extension, he is able to get to 100° flexion.  Passively patient is able to obtain full extension, 110° flexion.  Soft tissue swelling noted over the suprapatellar region.    A/P:  Dr. Álvarez presented to examination room to perform an aspiration.  Patient advised to continue physical therapy.  Medrol Dosepak sent to pharmacy for soft tissue swelling.  He was instructed on patellar  mobility exercises.  Recommended keep 3 knee sleeve on his frequent possible when weight-bearing.  Plan to follow the rehab plan as previously outlined. RTC in 4 weeks with Dr. Álvarez for 6 week post op visit.    PROCEDURE NOTE: right KNEE ASPIRATION  After time out was performed, including verification of patient ID, procedure, site and side, availability of information and equipment, review of safety issues, and agreement with consent, the procedure site was marked and the patient was prepped aseptically. A therapeutic aspiration was performed of 5cc clear, blood tinged fluid under sterile technique using a 18g needle through a superolateral approach in supine position.      The patient had no adverse reaction. Pain decreased. The patient was instructed to apply ice to the joint for 20 minutes and avoid strenuous activities for 24-36 hours following the injection. Following that time, patient can resume previously instructed activities. Signs and symptoms of infection were reviewed.          .

## 2020-10-22 NOTE — TELEPHONE ENCOUNTER
Called and spoke with patient Jovanni, he wanted to speak with Tony.  Patient coming this afternoon around 4pm.      ----- Message from Garett Dozier MA sent at 10/22/2020  8:21 AM CDT -----  Would you mind calling him?    Garett  ----- Message -----  From: Marylu Ratliff  Sent: 10/22/2020   8:12 AM CDT  To: Preeti Wong Staff    Pt had an appt asking for a call back      Contact info [phone]

## 2020-10-26 ENCOUNTER — CLINICAL SUPPORT (OUTPATIENT)
Dept: REHABILITATION | Facility: HOSPITAL | Age: 35
End: 2020-10-26
Payer: COMMERCIAL

## 2020-10-26 DIAGNOSIS — R29.898 WEAKNESS OF RIGHT LOWER EXTREMITY: ICD-10-CM

## 2020-10-26 DIAGNOSIS — M25.661 STIFFNESS OF RIGHT KNEE: ICD-10-CM

## 2020-10-26 PROCEDURE — 97140 MANUAL THERAPY 1/> REGIONS: CPT

## 2020-10-26 PROCEDURE — 97110 THERAPEUTIC EXERCISES: CPT

## 2020-10-26 NOTE — PROGRESS NOTES
"    Physical Therapy Daily Treatment Note     Name: Jovanni Kerr  Clinic Number: [id number]    Therapy Diagnosis:   Encounter Diagnoses   Name Primary?    Stiffness of right knee     Weakness of right lower extremity      Physician: Elvis Boss PA*    Visit Date: 10/26/2020  Physician Orders: PT Eval and Treat   Medical Diagnosis from Referral: M24.661 (ICD-10-CM) - Arthrofibrosis of knee joint, right  Evaluation Date: 10/12/2020  Authorization Period Expiration: 12/31/2020  Plan of Care Expiration: 4/31/2020  Visit # / Visits authorized: 5/ 30    Time In: 5:53 pm  Time Out: 6:40 pm  Total Billable Time: 25 minutes    Precautions: Standard    Subjective     Pt reports: He had his appointment with Tony and saw Dr. Álvarez. They tried aspirating his knee but couldn't get anything. He has been having difficulty with tolerating his dynasplint. Educated pt about trying heat prior to improve tissue extensibility.     He was compliant with home exercise program.  Response to previous treatment: no adverse effects  Functional change: none yet    Pain: 3/10  Location: right knee      Objective   ROM: 0/0/100    Jovanni received therapeutic exercises to develop strength, endurance, ROM and flexibility for 37 minutes including:   Upright bike x5 min for joint normalization and decreased edema  Quad set in hyperextension 2x10 10"  R knee extension self mobilization with gait belt around foot 20x5"  SAQ with AAROM and overpressure 20x5"  Prone quad stretch with capsular distraction 3x10  Shuttle squat 3x10 62.5#  TRX squats for depth 2x10    Jovanni received the following manual therapy techniques: Joint mobilizations were applied to the: R knee for 10 minutes, including:  Patellar and fat pad mobilizations    Jovanni participated in neuromuscular re-education activities to improve: Balance, Coordination, Kinesthetic, Sense and Proprioception for 00 minutes. The following activities were included:      Home Exercises " Provided and Patient Education Provided     Education provided:   - pathophysiology, importance of HEP, dynasplint adherence for outcomes    Written Home Exercises Provided: Patient instructed to cont prior HEP.  Exercises were reviewed and Jovanni was able to demonstrate them prior to the end of the session.  Jovanni demonstrated good  understanding of the education provided.     See EMR under Patient Instructions for exercises provided prior visit.    Assessment     Improved knee flexion range, but continues to have poor tolerance for end range flexion and extension. Able to achieve passive hyperextension, but springy end feel unable to be maintained once pressure is released. Improved squat form and depth when core control taken out of equation with TXR squats.    Jovanni is progressing well towards his goals.   Pt prognosis is Fair.     Pt will continue to benefit from skilled outpatient physical therapy to address the deficits listed in the problem list box on initial evaluation, provide pt/family education and to maximize pt's level of independence in the home and community environment.     Pt's spiritual, cultural and educational needs considered and pt agreeable to plan of care and goals.    Anticipated barriers to physical therapy: COVID, schedule, previous failure of PT, hx of poor adherence    Goals:   Short Term Goals: 6 weeks   - amb 1 mile on level tile without pain provocation or need for rest (progressing, not met)  - do SLS >30 sec without LOB or pain (progressing, not met)  - do lateral step down of 6  without pain provocation and with  adequate single leg mechanics for progression to higher level activities (progressing, not met)  - pt will demonstrate full knee extension and knee flexion within 5 degrees compared to contralateral side for improved mobility (progressing, not met)        Long Term Goals: 6 months  - pt will be able to perform multidirectional running activities for return to prior  level of function (progressing, not met)  - pt will demonstrate LSI within 95% with quadriceps and hamstring strength for decreased reinjury rate (progressing, not met)  - pt will demonstrate LSI within 95% with hop testing for decreased reinjury risk (progressing, not met)    Plan     Continue per POC, focusing on extension range of motion and quad activation, and progressing as tolerated.     Rashmi Faust, PT

## 2020-10-29 ENCOUNTER — CLINICAL SUPPORT (OUTPATIENT)
Dept: REHABILITATION | Facility: HOSPITAL | Age: 35
End: 2020-10-29
Payer: COMMERCIAL

## 2020-10-29 DIAGNOSIS — R29.898 WEAKNESS OF RIGHT LOWER EXTREMITY: ICD-10-CM

## 2020-10-29 DIAGNOSIS — M25.661 STIFFNESS OF RIGHT KNEE: ICD-10-CM

## 2020-10-29 PROCEDURE — 97110 THERAPEUTIC EXERCISES: CPT

## 2020-10-29 PROCEDURE — 97112 NEUROMUSCULAR REEDUCATION: CPT

## 2020-10-29 PROCEDURE — 97140 MANUAL THERAPY 1/> REGIONS: CPT

## 2020-10-29 NOTE — PROGRESS NOTES
"    Physical Therapy Daily Treatment Note     Name: Jovanni Kerr  Clinic Number: [id number]    Therapy Diagnosis:   Encounter Diagnoses   Name Primary?    Stiffness of right knee     Weakness of right lower extremity      Physician: Elvis Boss PA*    Visit Date: 10/29/2020  Physician Orders: PT Eval and Treat   Medical Diagnosis from Referral: M24.661 (ICD-10-CM) - Arthrofibrosis of knee joint, right  Evaluation Date: 10/12/2020  Authorization Period Expiration: 12/31/2020  Plan of Care Expiration: 4/31/2020  Visit # / Visits authorized: 6/ 30    Time In: 4:40 pm  Time Out: 5:30 pm  Total Billable Time: 50 minutes    Precautions: Standard    Subjective     Pt reports: He is still on vacation but has had to go into work a few times. He still has a lot of difficulty with the extension dynasplint, but has not tried doing it following hot pack use.     He was compliant with home exercise program.  Response to previous treatment: no adverse effects  Functional change: none yet    Pain: 3/10  Location: right knee      Objective   ROM: 0/0/112 in long sitting    Jovanni received therapeutic exercises to develop strength, endurance, ROM and flexibility for 30 minutes including:   Upright bike x10 min for joint normalization and decreased edema  Stick massage to quad to decrease chemical irritation and tone x5 min  Obturator nerve glide x15  R knee extension self mobilization with gait belt around foot 20x5"  Prone quad stretch with capsular distraction 3x10  Hand heel rocking with capsular distraction 20x5"    Jovanni received the following manual therapy techniques: Joint mobilizations were applied to the: R knee for 10 minutes, including:  Patellar and fat pad mobilizations    Jovanni participated in neuromuscular re-education activities to improve: Balance, Coordination, Kinesthetic, Sense and Proprioception for 10 minutes. The following activities were included:   Quad set in hyperextension 2x10 10"  SAQ " "with AAROM and overpressure 20x5"    Home Exercises Provided and Patient Education Provided     Education provided:   - pathophysiology, importance of HEP, dynasplint adherence for outcomes    Written Home Exercises Provided: Patient instructed to cont prior HEP.  Exercises were reviewed and Jovanni was able to demonstrate them prior to the end of the session.  Jovanni demonstrated good  understanding of the education provided.     See EMR under Patient Instructions for exercises provided prior visit.    Assessment     Within-session improvements noted with knee range of motion. Significant fat pad stiffness noted due to scope hole scarring. Will continue to focus on range of motion improvements during this early post-operative period.     Jovanni is progressing well towards his goals.   Pt prognosis is Fair.     Pt will continue to benefit from skilled outpatient physical therapy to address the deficits listed in the problem list box on initial evaluation, provide pt/family education and to maximize pt's level of independence in the home and community environment.     Pt's spiritual, cultural and educational needs considered and pt agreeable to plan of care and goals.    Anticipated barriers to physical therapy: COVID, schedule, previous failure of PT, hx of poor adherence    Goals:   Short Term Goals: 6 weeks   - amb 1 mile on level tile without pain provocation or need for rest (progressing, not met)  - do SLS >30 sec without LOB or pain (progressing, not met)  - do lateral step down of 6  without pain provocation and with  adequate single leg mechanics for progression to higher level activities (progressing, not met)  - pt will demonstrate full knee extension and knee flexion within 5 degrees compared to contralateral side for improved mobility (progressing, not met)        Long Term Goals: 6 months  - pt will be able to perform multidirectional running activities for return to prior level of function " (progressing, not met)  - pt will demonstrate LSI within 95% with quadriceps and hamstring strength for decreased reinjury rate (progressing, not met)  - pt will demonstrate LSI within 95% with hop testing for decreased reinjury risk (progressing, not met)    Plan     Continue per POC, focusing on extension range of motion and quad activation, and progressing as tolerated.     Rashmi Faust, PT

## 2020-11-02 ENCOUNTER — CLINICAL SUPPORT (OUTPATIENT)
Dept: REHABILITATION | Facility: HOSPITAL | Age: 35
End: 2020-11-02
Payer: COMMERCIAL

## 2020-11-02 DIAGNOSIS — R29.898 WEAKNESS OF RIGHT LOWER EXTREMITY: ICD-10-CM

## 2020-11-02 DIAGNOSIS — M25.661 STIFFNESS OF RIGHT KNEE: ICD-10-CM

## 2020-11-02 PROCEDURE — 97140 MANUAL THERAPY 1/> REGIONS: CPT

## 2020-11-02 PROCEDURE — 97110 THERAPEUTIC EXERCISES: CPT

## 2020-11-02 PROCEDURE — 97112 NEUROMUSCULAR REEDUCATION: CPT

## 2020-11-02 NOTE — PROGRESS NOTES
"    Physical Therapy Daily Treatment Note     Name: Jovanni Kerr  Clinic Number: [id number]    Therapy Diagnosis:   Encounter Diagnoses   Name Primary?    Stiffness of right knee     Weakness of right lower extremity      Physician: Elvis Boss PA*    Visit Date: 11/2/2020  Physician Orders: PT Eval and Treat   Medical Diagnosis from Referral: M24.661 (ICD-10-CM) - Arthrofibrosis of knee joint, right  Evaluation Date: 10/12/2020  Authorization Period Expiration: 12/31/2020  Plan of Care Expiration: 4/31/2020  Visit # / Visits authorized: 7/ 30    Time In: 5:50 pm  Time Out: 5:45 pm  Total Billable Time: 55 minutes    Precautions: Standard    Subjective     Pt reports: He is having point tenderness at his lateral knee, and feels he is getting stuck with flexion. He is on a new rotation where he has to stand more.     He was compliant with home exercise program.  Response to previous treatment: no adverse effects  Functional change: none yet    Pain: 3/10  Location: right knee      Objective   ROM: 3/0/115 in long sitting    Jovanni received therapeutic exercises to develop strength, endurance, ROM and flexibility for 35 minutes including:   Upright bike x10 min for joint normalization and decreased edema  Sciatic nerve glide x10  Obturator nerve glide x10  R knee extension self mobilization with gait belt around foot 20x5"  Standing calf raise 2x10  Seated hamstring curl blue theraband x10    Jovanni received the following manual therapy techniques: Joint mobilizations were applied to the: R knee for 10 minutes, including:  Patellar and fat pad mobilizations  Seated knee flexion with cupping     Jovanni participated in neuromuscular re-education activities to improve: Balance, Coordination, Kinesthetic, Sense and Proprioception for 10 minutes. The following activities were included:   Quad set in hyperextension 2x10 10"  Standing TKE 3x10 blue theraband    Home Exercises Provided and Patient Education " Provided     Education provided:   - pathophysiology, importance of HEP, dynasplint adherence for outcomes    Written Home Exercises Provided: Patient instructed to cont prior HEP.  Exercises were reviewed and Jovanni was able to demonstrate them prior to the end of the session.  Jovanni demonstrated good  understanding of the education provided.     See EMR under Patient Instructions for exercises provided prior visit.    Assessment     Increased focus on functional TKE in standing for maintenance of knee extension with new standing requirements at work. Pt with continued difficulty increasing knee flexion range, but significant improvements with knee extension, able to reach hyperextension. Reported subjective pinching in hamstrings with active contraction.     Jovanni is progressing well towards his goals.   Pt prognosis is Fair.     Pt will continue to benefit from skilled outpatient physical therapy to address the deficits listed in the problem list box on initial evaluation, provide pt/family education and to maximize pt's level of independence in the home and community environment.     Pt's spiritual, cultural and educational needs considered and pt agreeable to plan of care and goals.    Anticipated barriers to physical therapy: COVID, schedule, previous failure of PT, hx of poor adherence    Goals:   Short Term Goals: 6 weeks   - amb 1 mile on level tile without pain provocation or need for rest (progressing, not met)  - do SLS >30 sec without LOB or pain (progressing, not met)  - do lateral step down of 6  without pain provocation and with  adequate single leg mechanics for progression to higher level activities (progressing, not met)  - pt will demonstrate full knee extension and knee flexion within 5 degrees compared to contralateral side for improved mobility (progressing, not met)        Long Term Goals: 6 months  - pt will be able to perform multidirectional running activities for return to prior  level of function (progressing, not met)  - pt will demonstrate LSI within 95% with quadriceps and hamstring strength for decreased reinjury rate (progressing, not met)  - pt will demonstrate LSI within 95% with hop testing for decreased reinjury risk (progressing, not met)    Plan     Continue per POC, focusing on extension range of motion and quad activation, and progressing as tolerated.     Rashmi Faust, PT

## 2020-11-04 ENCOUNTER — CLINICAL SUPPORT (OUTPATIENT)
Dept: REHABILITATION | Facility: HOSPITAL | Age: 35
End: 2020-11-04
Payer: COMMERCIAL

## 2020-11-04 DIAGNOSIS — R29.898 WEAKNESS OF RIGHT LOWER EXTREMITY: ICD-10-CM

## 2020-11-04 DIAGNOSIS — M25.661 STIFFNESS OF RIGHT KNEE: ICD-10-CM

## 2020-11-04 PROCEDURE — 97110 THERAPEUTIC EXERCISES: CPT

## 2020-11-04 PROCEDURE — 97140 MANUAL THERAPY 1/> REGIONS: CPT

## 2020-11-05 NOTE — PROGRESS NOTES
"    Physical Therapy Daily Treatment Note     Name: Jovanni Kerr  Clinic Number: [id number]    Therapy Diagnosis:   Encounter Diagnoses   Name Primary?    Stiffness of right knee     Weakness of right lower extremity      Physician: Elvis Boss PA*    Visit Date: 11/4/2020  Physician Orders: PT Eval and Treat   Medical Diagnosis from Referral: M24.661 (ICD-10-CM) - Arthrofibrosis of knee joint, right  Evaluation Date: 10/12/2020  Authorization Period Expiration: 12/31/2020  Plan of Care Expiration: 4/31/2020  Visit # / Visits authorized: 8/ 30    Time In: 5:53 pm  Time Out: 6:30 pm  Total Billable Time: 37 minutes    Precautions: Standard    Subjective     Pt reports: He was on his feet a lot today at work, he just got out of the operating room. Flexing his R knee is still difficult.     He was compliant with home exercise program.  Response to previous treatment: no adverse effects  Functional change: none yet    Pain: 3/10  Location: right knee      Objective   ROM: 3/0/115 in long sitting    Jovanni received therapeutic exercises to develop strength, endurance, ROM and flexibility for 27 minutes including:   Upright bike x5 min for joint normalization and decreased edema  R knee extension self mobilization with gait belt around foot 20x5"  Quad set in hyperextension 2x10  Standing calf raise with eccentric lowering 2x10  TRX squat 3x10    Jovanni received the following manual therapy techniques: Joint mobilizations were applied to the: R knee for 10 minutes, including:  Patellar and fat pad mobilizations  Seated knee flexion with medial patellar glide    Jovanni participated in neuromuscular re-education activities to improve: Balance, Coordination, Kinesthetic, Sense and Proprioception for 00 minutes. The following activities were included:     Home Exercises Provided and Patient Education Provided     Education provided:   - pathophysiology, importance of HEP, dynasplint adherence for " outcomes    Written Home Exercises Provided: Patient instructed to cont prior HEP.  Exercises were reviewed and Jovanni was able to demonstrate them prior to the end of the session.  Jovanni demonstrated good  understanding of the education provided.     See EMR under Patient Instructions for exercises provided prior visit.    Assessment     Emphasis on continuing to increase R knee flexion and maintaining R knee hyperextension. Knee flexion is slightly improved following seated active assisted stretch with medial patellar glide. Pt was able to tolerate deep squats with TRX bands with increased knee flexion range from beginning to end of exercise. He will continue to benefit from PT to improve R knee ROM, strength and gait pattern to return to his prior level of function.     Jovanni is progressing well towards his goals.   Pt prognosis is Fair.     Pt will continue to benefit from skilled outpatient physical therapy to address the deficits listed in the problem list box on initial evaluation, provide pt/family education and to maximize pt's level of independence in the home and community environment.     Pt's spiritual, cultural and educational needs considered and pt agreeable to plan of care and goals.    Anticipated barriers to physical therapy: COVID, schedule, previous failure of PT, hx of poor adherence    Goals:   Short Term Goals: 6 weeks   - amb 1 mile on level tile without pain provocation or need for rest (progressing, not met)  - do SLS >30 sec without LOB or pain (progressing, not met)  - do lateral step down of 6  without pain provocation and with  adequate single leg mechanics for progression to higher level activities (progressing, not met)  - pt will demonstrate full knee extension and knee flexion within 5 degrees compared to contralateral side for improved mobility (progressing, not met)        Long Term Goals: 6 months  - pt will be able to perform multidirectional running activities for return  to prior level of function (progressing, not met)  - pt will demonstrate LSI within 95% with quadriceps and hamstring strength for decreased reinjury rate (progressing, not met)  - pt will demonstrate LSI within 95% with hop testing for decreased reinjury risk (progressing, not met)    Plan     Continue per POC, focusing on extension range of motion and quad activation, and progressing as tolerated.     Reuben Pierson, SPT    Rashmi Faust, PT     I certify that I was present in the room directing the student in service delivery and guiding them using my skilled judgement. As the co-signing therapist, I have reviewed the students documentation and am responsible for the treatment, assessment, and plan.

## 2020-11-11 ENCOUNTER — CLINICAL SUPPORT (OUTPATIENT)
Dept: REHABILITATION | Facility: HOSPITAL | Age: 35
End: 2020-11-11
Payer: COMMERCIAL

## 2020-11-11 DIAGNOSIS — M25.661 STIFFNESS OF RIGHT KNEE: ICD-10-CM

## 2020-11-11 DIAGNOSIS — R29.898 WEAKNESS OF RIGHT LOWER EXTREMITY: ICD-10-CM

## 2020-11-11 PROCEDURE — 97140 MANUAL THERAPY 1/> REGIONS: CPT

## 2020-11-11 PROCEDURE — 97110 THERAPEUTIC EXERCISES: CPT

## 2020-11-12 NOTE — PROGRESS NOTES
Physical Therapy Daily Treatment Note     Name: Jovanni Kerr  Clinic Number: [id number]    Therapy Diagnosis:   Encounter Diagnoses   Name Primary?    Stiffness of right knee     Weakness of right lower extremity      Physician: Elvis Boss PA*    Visit Date: 11/11/2020  Physician Orders: PT Eval and Treat   Medical Diagnosis from Referral: M24.661 (ICD-10-CM) - Arthrofibrosis of knee joint, right  Evaluation Date: 10/12/2020  Authorization Period Expiration: 12/31/2020  Plan of Care Expiration: 4/31/2020  Visit # / Visits authorized: 9/ 30    Time In: 5:50 pm  Time Out: 6:35 pm  Total Billable Time: 45 minutes    Precautions: Standard    Subjective     Pt reports: He is feeling tightness at his R distal quads and in his R knee joint. He feels anterior R knee pain with knee flexion.     He was compliant with home exercise program.  Response to previous treatment: no adverse effects  Functional change: none yet    Pain: 3/10  Location: right knee      Objective   ROM: 3/0/115 in long sitting    Jovanni received therapeutic exercises to develop strength, endurance, ROM and flexibility for 27 minutes including:   Upright bike x8 min for joint normalization and decreased edema  Pilates step up with knee flexion stretch 3x10  TRX squat 3x10  Heel slides 3x10  SLR 2x10    Jovanni received the following manual therapy techniques: Joint mobilizations were applied to the: R knee for 18 minutes, including:  Patellar and fat pad mobilizations  Prone quad stretch with tibiofemoral joint distraction and contract/relax  Stick massage to R distal quads    Jovanni participated in neuromuscular re-education activities to improve: Balance, Coordination, Kinesthetic, Sense and Proprioception for 00 minutes. The following activities were included:     Home Exercises Provided and Patient Education Provided     Education provided:   - pathophysiology, importance of HEP, dynasplint adherence for outcomes    Written Home  Exercises Provided: Patient instructed to cont prior HEP.  Exercises were reviewed and Jovanni was able to demonstrate them prior to the end of the session.  Jovanni demonstrated good  understanding of the education provided.     See EMR under Patient Instructions for exercises provided prior visit.    Assessment     Emphasis on continuing to increase R knee flexion and maintaining R knee hyperextension. He continues to demonstrate limitations in R knee flexion ROM which are improved following treatment session. Pt was able to tolerate pilates step up with increased knee flexion range within exercise. He will continue to benefit from PT to improve R knee ROM, strength and gait pattern to return to his prior level of function.     Jovanni is progressing well towards his goals.   Pt prognosis is Fair.     Pt will continue to benefit from skilled outpatient physical therapy to address the deficits listed in the problem list box on initial evaluation, provide pt/family education and to maximize pt's level of independence in the home and community environment.     Pt's spiritual, cultural and educational needs considered and pt agreeable to plan of care and goals.    Anticipated barriers to physical therapy: COVID, schedule, previous failure of PT, hx of poor adherence    Goals:   Short Term Goals: 6 weeks   - amb 1 mile on level tile without pain provocation or need for rest (progressing, not met)  - do SLS >30 sec without LOB or pain (progressing, not met)  - do lateral step down of 6  without pain provocation and with  adequate single leg mechanics for progression to higher level activities (progressing, not met)  - pt will demonstrate full knee extension and knee flexion within 5 degrees compared to contralateral side for improved mobility (progressing, not met)        Long Term Goals: 6 months  - pt will be able to perform multidirectional running activities for return to prior level of function (progressing, not  met)  - pt will demonstrate LSI within 95% with quadriceps and hamstring strength for decreased reinjury rate (progressing, not met)  - pt will demonstrate LSI within 95% with hop testing for decreased reinjury risk (progressing, not met)    Plan     Continue per POC, focusing on extension range of motion and quad activation, and progressing as tolerated.     Reuben Pierson, SPT    Rashmi Faust, PT     I certify that I was present in the room directing the student in service delivery and guiding them using my skilled judgement. As the co-signing therapist, I have reviewed the students documentation and am responsible for the treatment, assessment, and plan.

## 2020-11-16 ENCOUNTER — CLINICAL SUPPORT (OUTPATIENT)
Dept: REHABILITATION | Facility: HOSPITAL | Age: 35
End: 2020-11-16
Payer: COMMERCIAL

## 2020-11-16 DIAGNOSIS — R29.898 WEAKNESS OF RIGHT LOWER EXTREMITY: ICD-10-CM

## 2020-11-16 DIAGNOSIS — M25.661 STIFFNESS OF RIGHT KNEE: ICD-10-CM

## 2020-11-16 PROCEDURE — 97140 MANUAL THERAPY 1/> REGIONS: CPT

## 2020-11-16 PROCEDURE — 97110 THERAPEUTIC EXERCISES: CPT

## 2020-11-16 NOTE — PROGRESS NOTES
Physical Therapy Daily Treatment Note     Name: Jovanni Kerr  Clinic Number: [id number]    Therapy Diagnosis:   Encounter Diagnoses   Name Primary?    Stiffness of right knee     Weakness of right lower extremity      Physician: Elvis Boss PA*    Visit Date: 11/16/2020  Physician Orders: PT Eval and Treat   Medical Diagnosis from Referral: M24.661 (ICD-10-CM) - Arthrofibrosis of knee joint, right  Evaluation Date: 10/12/2020  Authorization Period Expiration: 12/31/2020  Plan of Care Expiration: 4/31/2020  Visit # / Visits authorized: 10/ 30    Time In: 6:00 pm  Time Out: 6:40 pm  Total Billable Time: 40 minutes    Precautions: Standard    Subjective     Pt reports: He feels pain at his R distal quad when going into extreme knee flexion. He thinks he needs to start taking Tylenol regularly because he is limited by pain.     He was compliant with home exercise program.  Response to previous treatment: no adverse effects  Functional change: none yet    Pain: 3/10  Location: right knee      Objective   ROM: 3/0/120 in long sitting    Jovanni received therapeutic exercises to develop strength, endurance, ROM and flexibility for 27 minutes including:   Upright bike x8 min for joint normalization and decreased edema  Prone quad stretch with capsular distraction 3x 1 min.  Seated knee flexion with contract/relax and medial knee glide 3x 1 min.  Heel slides 2x10  Shuttle squats 3x10 87.5#    Jovanni received the following manual therapy techniques: Joint mobilizations were applied to the: R knee for 8 minutes, including:  Patellar and fat pad mobilizations    Jovanni participated in neuromuscular re-education activities to improve: Balance, Coordination, Kinesthetic, Sense and Proprioception for 00 minutes. The following activities were included:     Jovanni participated in gait training activities for 5 minutes including: Retro walking with orange sport cord 2 laps ~50ft.    Home Exercises Provided and  Patient Education Provided     Education provided:   - pathophysiology, importance of HEP, dynasplint adherence for outcomes    Written Home Exercises Provided: Patient instructed to cont prior HEP.  Exercises were reviewed and Jovanni was able to demonstrate them prior to the end of the session.  Jovanni demonstrated good  understanding of the education provided.     See EMR under Patient Instructions for exercises provided prior visit.    Assessment     Pt continues to complain of R distal quad tightness and knee joint stiffness which limits his functional mobility. Knee flexion is increased following prone quad stretch, seated knee flexion with contract/relax and heel slides. He is able to tolerate shuttle squats with increased knee flexion within exercise. He is also able to tolerate retro walking with sport cord, achieving terminal knee extension with cueing.     Jovanni is progressing well towards his goals.   Pt prognosis is Fair.     Pt will continue to benefit from skilled outpatient physical therapy to address the deficits listed in the problem list box on initial evaluation, provide pt/family education and to maximize pt's level of independence in the home and community environment.     Pt's spiritual, cultural and educational needs considered and pt agreeable to plan of care and goals.    Anticipated barriers to physical therapy: COVID, schedule, previous failure of PT, hx of poor adherence    Goals:   Short Term Goals: 6 weeks   - amb 1 mile on level tile without pain provocation or need for rest (progressing, not met)  - do SLS >30 sec without LOB or pain (progressing, not met)  - do lateral step down of 6  without pain provocation and with  adequate single leg mechanics for progression to higher level activities (progressing, not met)  - pt will demonstrate full knee extension and knee flexion within 5 degrees compared to contralateral side for improved mobility (progressing, not met)        Long  Term Goals: 6 months  - pt will be able to perform multidirectional running activities for return to prior level of function (progressing, not met)  - pt will demonstrate LSI within 95% with quadriceps and hamstring strength for decreased reinjury rate (progressing, not met)  - pt will demonstrate LSI within 95% with hop testing for decreased reinjury risk (progressing, not met)    Plan     Continue per POC, focusing on extension range of motion and quad activation, and progressing as tolerated.     Reuben Pierson, SPT    Rashmi Faust, PT     I certify that I was present in the room directing the student in service delivery and guiding them using my skilled judgement. As the co-signing therapist, I have reviewed the students documentation and am responsible for the treatment, assessment, and plan.

## 2020-11-18 ENCOUNTER — TELEPHONE (OUTPATIENT)
Dept: SPORTS MEDICINE | Facility: CLINIC | Age: 35
End: 2020-11-18

## 2020-11-18 NOTE — TELEPHONE ENCOUNTER
----- Message from Justin Buchanan sent at 11/18/2020  4:06 PM CST -----  Contact: self  Lizzette Bajwa,    Could you please call and re-schedule this patient if possible please?    Thank you,    Justin Buchanan, Ms, OTC,   Clinical Assistant to Dr. Álvarez    ----- Message -----  From: Gurdeep Monge  Sent: 11/18/2020   3:49 PM CST  To: Preeti Wong Staff    Pt is asking for a call back in regards to rescheduling his post- op for a later date in the afternoon     Contact info- [phone]

## 2020-11-19 ENCOUNTER — CLINICAL SUPPORT (OUTPATIENT)
Dept: REHABILITATION | Facility: HOSPITAL | Age: 35
End: 2020-11-19
Payer: COMMERCIAL

## 2020-11-19 DIAGNOSIS — M25.561 ACUTE PAIN OF RIGHT KNEE: ICD-10-CM

## 2020-11-19 DIAGNOSIS — M25.661 STIFFNESS OF RIGHT KNEE: ICD-10-CM

## 2020-11-19 DIAGNOSIS — R29.898 WEAKNESS OF RIGHT LOWER EXTREMITY: ICD-10-CM

## 2020-11-19 PROCEDURE — 97110 THERAPEUTIC EXERCISES: CPT

## 2020-11-19 PROCEDURE — 97140 MANUAL THERAPY 1/> REGIONS: CPT

## 2020-11-19 PROCEDURE — 97112 NEUROMUSCULAR REEDUCATION: CPT

## 2020-11-19 NOTE — PROGRESS NOTES
Physical Therapy Daily Treatment Note     Name: Jovanni Kerr  Clinic Number: [id number]    Therapy Diagnosis:   No diagnosis found.  Physician: Elvis Boss PA*    Visit Date: 11/19/2020  Physician Orders: PT Eval and Treat   Medical Diagnosis from Referral: M24.661 (ICD-10-CM) - Arthrofibrosis of knee joint, right  Evaluation Date: 10/12/2020  Authorization Period Expiration: 12/31/2020  Plan of Care Expiration: 4/31/2020  Visit # / Visits authorized: 11/ 30    Time In: 1:40PM  Time Out: 3:00PM  Total Billable Time: 70minutes    Precautions: Standard    Subjective     Pt reports: That he is not in any pain upon arrival, however, he just got off of a long flight/drive while traveling.     He was not compliant with home exercise program.  Response to previous treatment: Patient felt less stiff  Functional change: None at this time.    Pain: 1/10  Location: right knee      Objective     ROM: At end of session: 0/5/105     Jovanni received therapeutic exercises to develop strength, endurance, ROM and flexibility for 25  minutes including:     Upright bike x8 min for joint normalization and decreased edema  Prone Quad Stretch 4x30s  Prone Quad Stretch w/ bolster 4x30s  Heel Prop w/ 5# AW 8min.  Heel slides 3x10 w/ 10s hold    Jovanni received the following manual therapy techniques: Joint mobilizations were applied to the: R knee for 30 minutes, including:    Patellar and fat pad mobilizations gd 3/4 in all directions at 0,20,and 60 degrees of knee flexion  Manual knee flexion 5x15s      Jovanni participated in neuromuscular re-education activities to improve: Balance, Coordination, Kinesthetic, Sense and Proprioception for 15 minutes. The following activities were included:     Quad Sets 2x10 w/ 10s holds  SLR in long sitting 2x10 w/ 5s holds    Home Exercises Provided and Patient Education Provided     Education provided:   - The patient was educated on the importance of consistently following his HEP and  the emphasis on restoring his range of motion in order to improve his strength.    Written Home Exercises Provided: Patient instructed to cont prior HEP.  Exercises were reviewed and Jovanni was able to demonstrate them prior to the end of the session.  Jovanni demonstrated good  understanding of the education provided.     See EMR under Patient Instructions for exercises provided prior visit.    Assessment     The patient arrived to clinic with significantly decreased ROM from measurements noted in the last session. He had spent the last few hours in a plane and driving with static knee positions. The emphasis of treatment focused exclusively on regaining ROM. He lacked patellar accessory mobility/flexion/extension which limited the patients ability to perform quad sets and SLRs. At the end of the session the patient had 0/5/105 ROM. He also had a c/o pain within his hip flexor during light overpressure to his knee when attempting to regain extension. He was educated on the need to continue to perform his HEP in order to regain his range, decrease the edema within the joint, and ultimately strengthen his knee extensors/flexors.    Jovanni is progressing well towards his goals.   Pt prognosis is Fair.     Pt will continue to benefit from skilled outpatient physical therapy to address the deficits listed in the problem list box on initial evaluation, provide pt/family education and to maximize pt's level of independence in the home and community environment.     Pt's spiritual, cultural and educational needs considered and pt agreeable to plan of care and goals.    Anticipated barriers to physical therapy: COVID, schedule, previous failure of PT, hx of poor adherence    Goals:   Short Term Goals: 6 weeks   - amb 1 mile on level tile without pain provocation or need for rest (progressing, not met)  - do SLS >30 sec without LOB or pain (progressing, not met)  - do lateral step down of 6  without pain provocation and  with  adequate single leg mechanics for progression to higher level activities (progressing, not met)  - pt will demonstrate full knee extension and knee flexion within 5 degrees compared to contralateral side for improved mobility (progressing, not met)        Long Term Goals: 6 months  - pt will be able to perform multidirectional running activities for return to prior level of function (progressing, not met)  - pt will demonstrate LSI within 95% with quadriceps and hamstring strength for decreased reinjury rate (progressing, not met)  - pt will demonstrate LSI within 95% with hop testing for decreased reinjury risk (progressing, not met)    Plan     Continue per POC, focusing on extension range of motion and quad activation, and progressing as tolerated.       Donn Clifton, PT

## 2020-11-20 ENCOUNTER — CLINICAL SUPPORT (OUTPATIENT)
Dept: REHABILITATION | Facility: HOSPITAL | Age: 35
End: 2020-11-20
Payer: COMMERCIAL

## 2020-11-20 DIAGNOSIS — R29.898 WEAKNESS OF RIGHT LOWER EXTREMITY: ICD-10-CM

## 2020-11-20 DIAGNOSIS — M25.661 STIFFNESS OF RIGHT KNEE: ICD-10-CM

## 2020-11-20 PROCEDURE — 97140 MANUAL THERAPY 1/> REGIONS: CPT | Mod: CQ

## 2020-11-20 PROCEDURE — 97110 THERAPEUTIC EXERCISES: CPT | Mod: CQ

## 2020-11-20 NOTE — PROGRESS NOTES
"    Physical Therapy Daily Treatment Note     Name: Jovanni Kerr  Clinic Number: [id number]    Therapy Diagnosis:   Encounter Diagnoses   Name Primary?    Stiffness of right knee     Weakness of right lower extremity      Physician: Elvis Boss PA*    Visit Date: 11/20/2020  Physician Orders: PT Eval and Treat   Medical Diagnosis from Referral: M24.661 (ICD-10-CM) - Arthrofibrosis of knee joint, right  Evaluation Date: 10/12/2020  Authorization Period Expiration: 12/31/2020  Plan of Care Expiration: 4/31/2020  Visit # / Visits authorized: 12/ 30    Time In: 7:23 (8 min late)  Time Out: 8:00 am  Total Billable Time:37    Precautions: Standard    Subjective     Pt reports:  Knee is doing good    He was compliant with home exercise program.  Response to previous treatment:   Functional change:     Pain: /10  Location: right knee      Objective   ROM: 3-0-110    Jovanni received therapeutic exercises to develop strength, endurance, ROM and flexibility for 29 minutes including:     Upright bike x5 min for joint normalization and decreased edema  Prone quad stretch with capsular distraction 3x 1 min. W/ Mob belt and manual over pressure  Hand heel rocks w/ capsular distraction 20x10" hold  Shuttle squats 3x10 87.5#-NP    Seated knee flexion with contract/relax and medial knee glide 3x 1 min.-NP  Heel slides 2x10-NP      Jovanni received the following manual therapy techniques: Joint mobilizations were applied to the: R knee for 8 minutes, including:  Patellar and fat pad mobilizations    Jovanni participated in neuromuscular re-education activities to improve: Balance, Coordination, Kinesthetic, Sense and Proprioception for 00 minutes. The following activities were included:     Jovanni participated in gait training activities for 5 minutes including: Retro walking with orange sport cord 2 laps ~50ft.    Home Exercises Provided and Patient Education Provided     Education provided:   - pathophysiology, " importance of HEP, dynasplint adherence for outcomes    Written Home Exercises Provided: Patient instructed to cont prior HEP.  Exercises were reviewed and Jovanni was able to demonstrate them prior to the end of the session.  Jovanni demonstrated good  understanding of the education provided.     See EMR under Patient Instructions for exercises provided prior visit.    Assessment     Improved knee FL measurements from yesterday but w/ pain at end range. maintaining hyperext. Added Hand heel rocks to HEP. Unable to perform all activity today 2* time    Jovanni is progressing well towards his goals.   Pt prognosis is Fair.     Pt will continue to benefit from skilled outpatient physical therapy to address the deficits listed in the problem list box on initial evaluation, provide pt/family education and to maximize pt's level of independence in the home and community environment.     Pt's spiritual, cultural and educational needs considered and pt agreeable to plan of care and goals.    Anticipated barriers to physical therapy: COVID, schedule, previous failure of PT, hx of poor adherence    Goals:   Short Term Goals: 6 weeks   - amb 1 mile on level tile without pain provocation or need for rest (progressing, not met)  - do SLS >30 sec without LOB or pain (progressing, not met)  - do lateral step down of 6  without pain provocation and with  adequate single leg mechanics for progression to higher level activities (progressing, not met)  - pt will demonstrate full knee extension and knee flexion within 5 degrees compared to contralateral side for improved mobility (progressing, not met)        Long Term Goals: 6 months  - pt will be able to perform multidirectional running activities for return to prior level of function (progressing, not met)  - pt will demonstrate LSI within 95% with quadriceps and hamstring strength for decreased reinjury rate (progressing, not met)  - pt will demonstrate LSI within 95% with hop  testing for decreased reinjury risk (progressing, not met)    Plan     Continue per POC, focusing on extension range of motion and quad activation, and progressing as tolerated.       Darleen Cervantes, PTA

## 2020-11-23 ENCOUNTER — CLINICAL SUPPORT (OUTPATIENT)
Dept: REHABILITATION | Facility: HOSPITAL | Age: 35
End: 2020-11-23
Payer: COMMERCIAL

## 2020-11-23 DIAGNOSIS — R29.898 WEAKNESS OF RIGHT LOWER EXTREMITY: ICD-10-CM

## 2020-11-23 DIAGNOSIS — M25.661 STIFFNESS OF RIGHT KNEE: ICD-10-CM

## 2020-11-23 PROCEDURE — 97140 MANUAL THERAPY 1/> REGIONS: CPT

## 2020-11-23 PROCEDURE — 97110 THERAPEUTIC EXERCISES: CPT

## 2020-11-23 NOTE — PROGRESS NOTES
S:Jovanni Kerr presents for post-operative evaluation.     DATE OF PROCEDURE: 10/9/2020   PROCEDURES PERFORMED:   Right knee arthroscopic lysis of adhesions with manipulation under anesthesia (CPT 76286)  Right knee arthroscopic chondroplasty (CPT 38837)    DATE OF PROCEDURE: 11/18/2019   PROCEDURES PERFORMED:   Right knee arthroscopic bone patellar tendon bone autograft ACL reconstruction (CPT 75579)  Right knee arthroscopic lateral plica band resection (CPT 42051)  Right knee arthroscopic partial lateral release (CPT 95161)    Jovanni Kerr reports to be doing ok just over 6 weeks s/p lysis of adhesions and chondroplasty, 11 months s/p ACL reconstruction. Going to PT 2xWeek at the [place] location working with Rashmi Faust. He feels his extension has improved but he still lacks knee flexion. Ambulating with greater ease but describes difficulty with activities requiring greater terminal flexion.  Remains on his feet several hours a day.  He is a cardiothoracic surgery fellow.    O: RLE - The incisions are well healed.  He does have a 1+ effusion.  The quadriceps activates well.  Terminal extension is symmetric to the other side which is improved from prior to surgery.  Flexion to 90° with some pain and guarding.  By report he has flexion to 110° in therapy.  Decreased patellar mobility compared to last time.    A/P:    Jovanni continues to struggle with his range of motion.  Certainly his activity at work remains a complicating factor.  He has some swelling today.  We aspirated the right knee today, 32 cc of clear synovial fluid was removed.  I think there is some underlying generalized inflammation present.  He took the Medrol dose pack previously with limited relief.  On arthroscopy the graft looked quite good and there was no significant chondral or meniscal injury.  Corticosteroid injection also was given today to see if we can help him with some of the inflammation.  Also would like to get him a flexion  DynaSplint to use at home.  We will continue to work in physical therapy.  Continue with patellar mobilization exercises.  I gave him a new knee compression sleeve to use at work.  All questions answered.  I will see him back in 6 weeks.

## 2020-11-24 ENCOUNTER — OFFICE VISIT (OUTPATIENT)
Dept: SPORTS MEDICINE | Facility: CLINIC | Age: 35
End: 2020-11-24
Payer: COMMERCIAL

## 2020-11-24 VITALS
WEIGHT: 168 LBS | DIASTOLIC BLOOD PRESSURE: 75 MMHG | BODY MASS INDEX: 24.81 KG/M2 | SYSTOLIC BLOOD PRESSURE: 130 MMHG | HEART RATE: 76 BPM

## 2020-11-24 DIAGNOSIS — M25.461 EFFUSION OF RIGHT KNEE: ICD-10-CM

## 2020-11-24 DIAGNOSIS — Z47.89 SURGICAL AFTERCARE, MUSCULOSKELETAL SYSTEM: Primary | ICD-10-CM

## 2020-11-24 DIAGNOSIS — M25.661 KNEE STIFFNESS, RIGHT: ICD-10-CM

## 2020-11-24 PROCEDURE — 1126F PR PAIN SEVERITY QUANTIFIED, NO PAIN PRESENT: ICD-10-PCS | Mod: S$GLB,,, | Performed by: ORTHOPAEDIC SURGERY

## 2020-11-24 PROCEDURE — 20610 DRAIN/INJ JOINT/BURSA W/O US: CPT | Mod: 58,RT,S$GLB, | Performed by: ORTHOPAEDIC SURGERY

## 2020-11-24 PROCEDURE — 99024 POSTOP FOLLOW-UP VISIT: CPT | Mod: S$GLB,,, | Performed by: ORTHOPAEDIC SURGERY

## 2020-11-24 PROCEDURE — 20610 LARGE JOINT ASPIRATION/INJECTION: R KNEE: ICD-10-PCS | Mod: 58,RT,S$GLB, | Performed by: ORTHOPAEDIC SURGERY

## 2020-11-24 PROCEDURE — 99999 PR PBB SHADOW E&M-EST. PATIENT-LVL III: ICD-10-PCS | Mod: PBBFAC,,, | Performed by: ORTHOPAEDIC SURGERY

## 2020-11-24 PROCEDURE — 99999 PR PBB SHADOW E&M-EST. PATIENT-LVL III: CPT | Mod: PBBFAC,,, | Performed by: ORTHOPAEDIC SURGERY

## 2020-11-24 PROCEDURE — 3008F BODY MASS INDEX DOCD: CPT | Mod: CPTII,S$GLB,, | Performed by: ORTHOPAEDIC SURGERY

## 2020-11-24 PROCEDURE — 3008F PR BODY MASS INDEX (BMI) DOCUMENTED: ICD-10-PCS | Mod: CPTII,S$GLB,, | Performed by: ORTHOPAEDIC SURGERY

## 2020-11-24 PROCEDURE — 1126F AMNT PAIN NOTED NONE PRSNT: CPT | Mod: S$GLB,,, | Performed by: ORTHOPAEDIC SURGERY

## 2020-11-24 PROCEDURE — 99024 PR POST-OP FOLLOW-UP VISIT: ICD-10-PCS | Mod: S$GLB,,, | Performed by: ORTHOPAEDIC SURGERY

## 2020-11-24 RX ORDER — TRIAMCINOLONE ACETONIDE 40 MG/ML
40 INJECTION, SUSPENSION INTRA-ARTICULAR; INTRAMUSCULAR
Status: DISCONTINUED | OUTPATIENT
Start: 2020-11-24 | End: 2020-11-24 | Stop reason: HOSPADM

## 2020-11-24 RX ADMIN — TRIAMCINOLONE ACETONIDE 40 MG: 40 INJECTION, SUSPENSION INTRA-ARTICULAR; INTRAMUSCULAR at 04:11

## 2020-11-24 NOTE — PROGRESS NOTES
"    Physical Therapy Daily Treatment Note     Name: Jovanni Kerr  Clinic Number: [id number]    Therapy Diagnosis:   Encounter Diagnoses   Name Primary?    Stiffness of right knee     Weakness of right lower extremity      Physician: Elvis Boss PA*    Visit Date: 11/23/2020  Physician Orders: PT Eval and Treat   Medical Diagnosis from Referral: M24.661 (ICD-10-CM) - Arthrofibrosis of knee joint, right  Evaluation Date: 10/12/2020  Authorization Period Expiration: 12/31/2020  Plan of Care Expiration: 4/31/2020  Visit # / Visits authorized: 13/ 30    Time In: 5:45pm  Time Out: 6:30pm  Total Billable Time: 45 minutes    Precautions: Standard    Subjective     Pt reports:  He continues to report R knee tightness, specifically with knee flexion. He feels limited in his ability to pick things up and negotiate stairs.     He was compliant with home exercise program.  Response to previous treatment: no adverse effects  Functional change: none    Pain: did not verbalize/10  Location: right knee      Objective   ROM: 4-0-112    Jovanni received therapeutic exercises to develop strength, endurance, ROM and flexibility for 30 minutes including:     Upright bike x10 min for joint normalization and decreased edema  Knee extension hinge mobilization with gait belt 20x5"  SAQ 2x10  Prone quad stretch with capsular distraction 3x 1 min. W/ Mob belt and manual over pressure  Seated knee flexion with contract relax 3x 1 min.  Pilates step up 3x10  Heel slides with medial patellar glide 2x10    Jovanni received the following manual therapy techniques: Joint mobilizations were applied to the: R knee for 10 minutes, including:  Patellar and fat pad mobilizations    Jovanni participated in neuromuscular re-education activities to improve: Balance, Coordination, Kinesthetic, Sense and Proprioception for 00 minutes. The following activities were included:     Jovanni participated in gait training activities for 5 minutes " including: Retro walking with orange sport cord 2 laps ~50ft.    Home Exercises Provided and Patient Education Provided     Education provided:   - pathophysiology, importance of HEP, dynasplint adherence for outcomes    Written Home Exercises Provided: Patient instructed to cont prior HEP.  Exercises were reviewed and Jovanni was able to demonstrate them prior to the end of the session.  Jovanni demonstrated good  understanding of the education provided.     See EMR under Patient Instructions for exercises provided prior visit.    Assessment     Pt continues to be limited by stiffness in his knee, specifically with knee flexion. Knee flexion ROM is improved following seated knee flexion with contract/relax, pilates step up, and prone quad stretch. Pt's knee flexion limitations have impaired his ability to bend down and pick objects up, along with negotiating stairs. He also demonstrates decreased knee extension in terminal stance which is improved following retro walking. He will continue to benefit from physical therapy to improve his RLE ROM and strength, along with functional mobility training to improve his level of function.     Jovanni is progressing well towards his goals.   Pt prognosis is Fair.     Pt will continue to benefit from skilled outpatient physical therapy to address the deficits listed in the problem list box on initial evaluation, provide pt/family education and to maximize pt's level of independence in the home and community environment.     Pt's spiritual, cultural and educational needs considered and pt agreeable to plan of care and goals.    Anticipated barriers to physical therapy: COVID, schedule, previous failure of PT, hx of poor adherence    Goals:   Short Term Goals: 6 weeks   - amb 1 mile on level tile without pain provocation or need for rest (progressing, not met)  - do SLS >30 sec without LOB or pain (progressing, not met)  - do lateral step down of 6  without pain provocation  and with  adequate single leg mechanics for progression to higher level activities (progressing, not met)  - pt will demonstrate full knee extension and knee flexion within 5 degrees compared to contralateral side for improved mobility (progressing, not met)        Long Term Goals: 6 months  - pt will be able to perform multidirectional running activities for return to prior level of function (progressing, not met)  - pt will demonstrate LSI within 95% with quadriceps and hamstring strength for decreased reinjury rate (progressing, not met)  - pt will demonstrate LSI within 95% with hop testing for decreased reinjury risk (progressing, not met)    Plan     Continue per POC, focusing on extension range of motion and quad activation, and progressing as tolerated.     Reuben Pierson, SPT    Rashmi Faust, PT     I certify that I was present in the room directing the student in service delivery and guiding them using my skilled judgement. As the co-signing therapist, I have reviewed the students documentation and am responsible for the treatment, assessment, and plan.

## 2020-11-24 NOTE — PROCEDURES
Large Joint Aspiration/Injection: R knee    Date/Time: 11/24/2020 4:15 PM  Performed by: JOSEF Álvarez MD  Authorized by: JOSEF Álvarez MD     Consent Done?:  Yes (Verbal)  Indications:  Joint swelling  Site marked: the procedure site was marked    Timeout: prior to procedure the correct patient, procedure, and site was verified    Prep: patient was prepped and draped in usual sterile fashion      Local anesthesia used?: Yes    Anesthesia:  Local infiltration  Local anesthetic: 0.2% Naropin.  Anesthetic total (ml):  5      Details:  Needle Size:  22 G  Approach:  Superior  Location:  Knee  Site:  R knee  Medications:  40 mg triamcinolone acetonide 40 mg/mL  Aspirate amount (mL):  32  Aspirate:  Serous and clear  Patient tolerance:  Patient tolerated the procedure well with no immediate complications

## 2020-12-01 ENCOUNTER — CLINICAL SUPPORT (OUTPATIENT)
Dept: REHABILITATION | Facility: HOSPITAL | Age: 35
End: 2020-12-01
Payer: COMMERCIAL

## 2020-12-01 DIAGNOSIS — M25.661 STIFFNESS OF RIGHT KNEE: ICD-10-CM

## 2020-12-01 DIAGNOSIS — R29.898 WEAKNESS OF RIGHT LOWER EXTREMITY: ICD-10-CM

## 2020-12-01 PROCEDURE — 97140 MANUAL THERAPY 1/> REGIONS: CPT

## 2020-12-01 PROCEDURE — 97110 THERAPEUTIC EXERCISES: CPT

## 2020-12-01 PROCEDURE — 97112 NEUROMUSCULAR REEDUCATION: CPT

## 2020-12-01 NOTE — PROGRESS NOTES
Physical Therapy Daily Treatment Note     Name: Jovanni Kerr  Clinic Number: [id number]    Therapy Diagnosis:   Encounter Diagnoses   Name Primary?    Stiffness of right knee     Weakness of right lower extremity      Physician: Elvis Boss PA*    Visit Date: 12/1/2020  Physician Orders: PT Eval and Treat   Medical Diagnosis from Referral: M24.661 (ICD-10-CM) - Arthrofibrosis of knee joint, right  Evaluation Date: 10/12/2020  Authorization Period Expiration: 12/31/2020  Plan of Care Expiration: 4/31/2020  Visit # / Visits authorized: 14/ 30    Time In: 4:25 pm  Time Out: 5:20 pm  Total Billable Time: 45 minutes    Precautions: Standard    Subjective     Pt reports:  He saw Dr. Álvarez last week and got an aspiration and injection. He notices improved ability to get to flexion, but not necessarily more range.     He was compliant with home exercise program.  Response to previous treatment: no adverse effects  Functional change: none    Pain: did not verbalize/10  Location: right knee      Objective   ROM: 4-0-112    Jovanni received therapeutic exercises to develop strength, endurance, ROM and flexibility for 23 minutes including:   Prone quad stretch with hamstring inhibition 3x1 min  Prone quad stretch with capsular distraction 3x 1 min. W/ Mob belt and manual over pressure  DL bridge 3x10  Heel slide x10    Jovanni received the following manual therapy techniques: Joint mobilizations were applied to the: R knee for 8 minutes, including:  Patellar and fat pad mobilizations  Femoral inferior mobilizations    Jovanni participated in neuromuscular re-education activities to improve: Balance, Coordination, Kinesthetic, Sense and Proprioception for 14 minutes. The following activities were included:   Squat retraining x10 min (with and without band for hip shift proprioception)  Step climbing x10 laps each    Jovanni participated in gait training activities for 0 minutes including:    Home Exercises  Provided and Patient Education Provided     Education provided:   - pathophysiology, importance of HEP, dynasplint adherence for outcomes    Written Home Exercises Provided: Patient instructed to cont prior HEP.  Exercises were reviewed and Jovanni was able to demonstrate them prior to the end of the session.  Jovanni demonstrated good  understanding of the education provided.     See EMR under Patient Instructions for exercises provided prior visit.    Assessment     Pt with limitations at infrapatellar fat pad when going into flexion, and initiation of hip soreness during heel slides that improved with femoral inferior glides. Squat form improved with cues to prevent hip shift. Pt given flexion dynasplint today with instructions for home use. Will continue to promote improved range and functional mobility for maintenance of motion during work.     Jovanni is progressing well towards his goals.   Pt prognosis is Fair.     Pt will continue to benefit from skilled outpatient physical therapy to address the deficits listed in the problem list box on initial evaluation, provide pt/family education and to maximize pt's level of independence in the home and community environment.     Pt's spiritual, cultural and educational needs considered and pt agreeable to plan of care and goals.    Anticipated barriers to physical therapy: COVID, schedule, previous failure of PT, hx of poor adherence    Goals:   Short Term Goals: 6 weeks   - amb 1 mile on level tile without pain provocation or need for rest (progressing, not met)  - do SLS >30 sec without LOB or pain (progressing, not met)  - do lateral step down of 6  without pain provocation and with  adequate single leg mechanics for progression to higher level activities (progressing, not met)  - pt will demonstrate full knee extension and knee flexion within 5 degrees compared to contralateral side for improved mobility (progressing, not met)        Long Term Goals: 6  months  - pt will be able to perform multidirectional running activities for return to prior level of function (progressing, not met)  - pt will demonstrate LSI within 95% with quadriceps and hamstring strength for decreased reinjury rate (progressing, not met)  - pt will demonstrate LSI within 95% with hop testing for decreased reinjury risk (progressing, not met)    Plan     Continue per POC, focusing on extension range of motion and quad activation, and progressing as tolerated.     Reuben Pierson, SPT    Rashmi Faust, PT     I certify that I was present in the room directing the student in service delivery and guiding them using my skilled judgement. As the co-signing therapist, I have reviewed the students documentation and am responsible for the treatment, assessment, and plan.

## 2020-12-03 ENCOUNTER — CLINICAL SUPPORT (OUTPATIENT)
Dept: REHABILITATION | Facility: HOSPITAL | Age: 35
End: 2020-12-03
Payer: COMMERCIAL

## 2020-12-03 DIAGNOSIS — M25.661 STIFFNESS OF RIGHT KNEE: ICD-10-CM

## 2020-12-03 DIAGNOSIS — R29.898 WEAKNESS OF RIGHT LOWER EXTREMITY: ICD-10-CM

## 2020-12-03 PROCEDURE — 97140 MANUAL THERAPY 1/> REGIONS: CPT

## 2020-12-03 PROCEDURE — 97110 THERAPEUTIC EXERCISES: CPT

## 2020-12-03 PROCEDURE — 97112 NEUROMUSCULAR REEDUCATION: CPT

## 2020-12-03 NOTE — PROGRESS NOTES
Physical Therapy Daily Treatment Note     Name: Jovanni Kerr  Clinic Number: [id number]    Therapy Diagnosis:   Encounter Diagnoses   Name Primary?    Stiffness of right knee     Weakness of right lower extremity      Physician: Elvis oBss PA*    Visit Date: 12/3/2020  Physician Orders: PT Eval and Treat   Medical Diagnosis from Referral: M24.661 (ICD-10-CM) - Arthrofibrosis of knee joint, right  Evaluation Date: 10/12/2020  Authorization Period Expiration: 12/31/2020  Plan of Care Expiration: 4/31/2020  Visit # / Visits authorized: 14/ 30    Time In: 6 pm  Time Out: 7 pm  Total Billable Time: 45 minutes    Precautions: Standard    Subjective     Pt reports:  Patient reports that he notices an improved ability to move between knee extension/flexion, however, he still lacks range.    He was compliant with home exercise program.  Response to previous treatment: no adverse effects  Functional change: none    Pain:0/10  Location: right knee      Objective   ROM: 4-0-112    Jovanni received therapeutic exercises to develop strength, endurance, ROM and flexibility for 10 minutes including:   Prone quad stretch with hamstring inhibition 3x1 min  Prone quad stretch with capsular distraction 3x 1 min. W/ Mob belt and manual over pressure  Heel slide 2x10    Jovanni received the following manual therapy techniques: Joint mobilizations were applied to the: R knee for 10 minutes, including:    Patellar and fat pad mobilizations  Knee flexion ROM with medial/inferior patellar glides     Jovanni participated in neuromuscular re-education activities to improve: Balance, Coordination, Kinesthetic, Sense and Proprioception for 23 minutes. The following activities were included:   Quad Set 10x10s  SAQ 2x10,5s hold  RTB TKE in CKC  SLR 2x10,5s hold    Jovanni participated in gait training activities for 0 minutes including:    Home Exercises Provided and Patient Education Provided     Education provided:   -   importance of HEP , dynasplint adherence for outcomes    Written Home Exercises Provided: Patient instructed to cont prior HEP.  Exercises were reviewed and Jovanni was able to demonstrate them prior to the end of the session.  Jovanni demonstrated good  understanding of the education provided.     See EMR under Patient Instructions for exercises provided prior visit.    Assessment     The patient demonstrates significant stiffness in his patella and tib-femoral joint upon arrival. 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 In addition, he demonstrates improved quad contraction in CKC. A standing TKE with a theraband was added to his HEP in order to continue to improve his quad function. Focus of future therapy will continue to be on gaining knee flexion, maintaining gained knee extension, and improving the contraction/strength of his quad.      Jovanni is progressing well towards his goals.   Pt prognosis is Fair.     Pt will continue to benefit from skilled outpatient physical therapy to address the deficits listed in the problem list box on initial evaluation, provide pt/family education and to maximize pt's level of independence in the home and community environment.     Pt's spiritual, cultural and educational needs considered and pt agreeable to plan of care and goals.    Anticipated barriers to physical therapy: COVID, schedule, previous failure of PT, hx of poor adherence    Goals:   Short Term Goals: 6 weeks   - amb 1 mile on level tile without pain provocation or need for rest (progressing, not met)  - do SLS >30 sec without LOB or pain (progressing, not met)  - do lateral step down of 6  without pain provocation and with  adequate single leg mechanics for progression to higher level activities (progressing, not met)  - pt will demonstrate full knee extension and knee flexion within 5 degrees compared to contralateral side for improved mobility (progressing, not  met)        Long Term Goals: 6 months  - pt will be able to perform multidirectional running activities for return to prior level of function (progressing, not met)  - pt will demonstrate LSI within 95% with quadriceps and hamstring strength for decreased reinjury rate (progressing, not met)  - pt will demonstrate LSI within 95% with hop testing for decreased reinjury risk (progressing, not met)    Plan     Continue per POC, focusing on extension range of motion and quad activation, and progressing as tolerated.       Donn Clifton, PT

## 2020-12-07 ENCOUNTER — CLINICAL SUPPORT (OUTPATIENT)
Dept: REHABILITATION | Facility: HOSPITAL | Age: 35
End: 2020-12-07
Payer: COMMERCIAL

## 2020-12-07 DIAGNOSIS — M25.661 STIFFNESS OF RIGHT KNEE: ICD-10-CM

## 2020-12-07 DIAGNOSIS — R29.898 WEAKNESS OF RIGHT LOWER EXTREMITY: ICD-10-CM

## 2020-12-07 PROCEDURE — 97140 MANUAL THERAPY 1/> REGIONS: CPT

## 2020-12-07 PROCEDURE — 97110 THERAPEUTIC EXERCISES: CPT

## 2020-12-07 PROCEDURE — 97112 NEUROMUSCULAR REEDUCATION: CPT

## 2020-12-07 NOTE — PROGRESS NOTES
Physical Therapy Daily Treatment Note     Name: Jovanni Kerr  Clinic Number: [id number]    Therapy Diagnosis:   Encounter Diagnoses   Name Primary?    Stiffness of right knee     Weakness of right lower extremity      Physician: Elvis Boss PA*    Visit Date: 12/7/2020  Physician Orders: PT Eval and Treat   Medical Diagnosis from Referral: M24.661 (ICD-10-CM) - Arthrofibrosis of knee joint, right  Evaluation Date: 10/12/2020  Authorization Period Expiration: 12/31/2020  Plan of Care Expiration: 4/31/2020  Visit # / Visits authorized: 15/ 30    Time In: 1637  Time Out: 1730  Total Billable Time: 45 minutes    Precautions: Standard    Subjective     Pt reports:  Patient reports that he notices an improved ability to move between knee extension/flexion, however, he still lacks range.    He was compliant with home exercise program.  Response to previous treatment: no adverse effects  Functional change: none    Pain:0/10  Location: right knee      Objective   ROM: 4-0-112    Jovanni received therapeutic exercises to develop strength, endurance, ROM and flexibility for 15 minutes including:   Prone quad stretch with hamstring inhibition 3x1 min  Quadruped rock back with popliteal wedge/capsular distraction 2x10  Heel slide with power band TKE quad activation 2x10    Jovanni received the following manual therapy techniques: Joint mobilizations were applied to the: R knee for 10 minutes, including:    Patellar and fat pad mobilizations  Knee flexion ROM with medial/inferior patellar glides     Jovanni participated in neuromuscular re-education activities to improve: Balance, Coordination, Kinesthetic, Sense and Proprioception for 20 minutes. The following activities were included:   SAQ 2x10,5s hold  RTB TKE in CKC  SLR 2x10,5s hold    Jovanni participated in gait training activities for 0 minutes including:    Home Exercises Provided and Patient Education Provided     Education provided:   -  importance of  HEP , dynasplint adherence for outcomes    Written Home Exercises Provided: Patient instructed to cont prior HEP.  Exercises were reviewed and Jovanni was able to demonstrate them prior to the end of the session.  Jovanni demonstrated good  understanding of the education provided.     See EMR under Patient Instructions for exercises provided prior visit.    Assessment     Continued stiffness present with assessment of patella and tib-femoral joint at the onset of today's treatment which improved by conclusion of treatment. Pt also demo'd lack of TKE at onset of treatment which improved with Heel slide to powerband quad set. Continued with standing TKE with powerband resistance progression from previous treatment with excellent tolerance and ability to achieve TKE in CK.  Will continue to be on gaining knee flexion, maintaining gained knee extension, and improving the contraction/strength of his quad.    Jovanni is progressing well towards his goals.   Pt prognosis is Fair.     Pt will continue to benefit from skilled outpatient physical therapy to address the deficits listed in the problem list box on initial evaluation, provide pt/family education and to maximize pt's level of independence in the home and community environment.     Pt's spiritual, cultural and educational needs considered and pt agreeable to plan of care and goals.    Anticipated barriers to physical therapy: COVID, schedule, previous failure of PT, hx of poor adherence    Goals:   Short Term Goals: 6 weeks   - amb 1 mile on level tile without pain provocation or need for rest (progressing, not met)  - do SLS >30 sec without LOB or pain (progressing, not met)  - do lateral step down of 6  without pain provocation and with  adequate single leg mechanics for progression to higher level activities (progressing, not met)  - pt will demonstrate full knee extension and knee flexion within 5 degrees compared to contralateral side for improved mobility  (progressing, not met)        Long Term Goals: 6 months  - pt will be able to perform multidirectional running activities for return to prior level of function (progressing, not met)  - pt will demonstrate LSI within 95% with quadriceps and hamstring strength for decreased reinjury rate (progressing, not met)  - pt will demonstrate LSI within 95% with hop testing for decreased reinjury risk (progressing, not met)    Plan     Continue per POC, focusing on extension range of motion and quad activation, and progressing as tolerated.       Michael Azar, PT, DPT

## 2020-12-10 ENCOUNTER — CLINICAL SUPPORT (OUTPATIENT)
Dept: REHABILITATION | Facility: HOSPITAL | Age: 35
End: 2020-12-10
Payer: COMMERCIAL

## 2020-12-10 DIAGNOSIS — R29.898 WEAKNESS OF RIGHT LOWER EXTREMITY: ICD-10-CM

## 2020-12-10 DIAGNOSIS — M25.661 STIFFNESS OF RIGHT KNEE: ICD-10-CM

## 2020-12-10 PROCEDURE — 97112 NEUROMUSCULAR REEDUCATION: CPT

## 2020-12-10 PROCEDURE — 97140 MANUAL THERAPY 1/> REGIONS: CPT

## 2020-12-10 PROCEDURE — 97110 THERAPEUTIC EXERCISES: CPT

## 2020-12-11 NOTE — PROGRESS NOTES
Physical Therapy Daily Treatment Note     Name: Jovanni Kerr  Clinic Number: [id number]    Therapy Diagnosis:   Encounter Diagnoses   Name Primary?    Stiffness of right knee     Weakness of right lower extremity      Physician: Elvis Boss PA*    Visit Date: 12/10/2020  Physician Orders: PT Eval and Treat   Medical Diagnosis from Referral: M24.661 (ICD-10-CM) - Arthrofibrosis of knee joint, right  Evaluation Date: 10/12/2020  Authorization Period Expiration: 12/31/2020  Plan of Care Expiration: 4/31/2020  Visit # / Visits authorized: 16/ 30    Time In: 610PM  Time Out: 7PM  Total Billable Time: 50 minutes    Precautions: Standard    Subjective     Pt reports: Patient reports that his knee feels fine at this time. He does not have any issues    He was compliant with home exercise program.  Response to previous treatment: no adverse effects  Functional change: none    Pain:0/10  Location: right knee      Objective   ROM: 3/0/105    Jovanni received therapeutic exercises to develop strength, endurance, ROM and flexibility for 15 minutes including:     Heel prop 10min  Prone gravity knee extension 5min  Heel slides into knee flexion 3x10s    Jovanni received the following manual therapy techniques: Joint mobilizations were applied to the: R knee for 10 minutes, including:    Patellar and fat pad mobilizations  Knee flexion ROM with medial/inferior patellar glides   EOT Knee Flexion    Jovanni participated in neuromuscular re-education activities to improve: Balance, Coordination, Kinesthetic, Sense and Proprioception for 20 minutes. The following activities were included:     RTB TKE in CKC  SLR 2x10,5s hold  Contract/Relax into knee flexion 3x5    Jovanni participated in gait training activities for 0 minutes including:    Home Exercises Provided and Patient Education Provided     Education provided:   -  importance of HEP , dynasplint adherence for outcomes    Written Home Exercises Provided: Patient  instructed to cont prior HEP.  Exercises were reviewed and Jovanni was able to demonstrate them prior to the end of the session.  Jovanni demonstrated good  understanding of the education provided.     See EMR under Patient Instructions for exercises provided prior visit.    Assessment     Patient demonstrates stiffness into both knee extension and flexion upon arrival. The pt requires extensive manual therapy and ROM interventions to achieve the range that is listed under the objective section(3/0/105). Patient continues to report non-compliance with the dynasplint and states he has limited time to perform his ROM HEP. The patient makes good within session improvements in regards to ROM, however, these improvements are not maintained from session to session.     Jovanni is progressing well towards his goals.   Pt prognosis is Fair.     Pt will continue to benefit from skilled outpatient physical therapy to address the deficits listed in the problem list box on initial evaluation, provide pt/family education and to maximize pt's level of independence in the home and community environment.     Pt's spiritual, cultural and educational needs considered and pt agreeable to plan of care and goals.    Anticipated barriers to physical therapy: COVID, schedule, previous failure of PT, hx of poor adherence    Goals:   Short Term Goals: 6 weeks   - amb 1 mile on level tile without pain provocation or need for rest (progressing, not met)  - do SLS >30 sec without LOB or pain (progressing, not met)  - do lateral step down of 6  without pain provocation and with  adequate single leg mechanics for progression to higher level activities (progressing, not met)  - pt will demonstrate full knee extension and knee flexion within 5 degrees compared to contralateral side for improved mobility (progressing, not met)        Long Term Goals: 6 months  - pt will be able to perform multidirectional running activities for return to prior  level of function (progressing, not met)  - pt will demonstrate LSI within 95% with quadriceps and hamstring strength for decreased reinjury rate (progressing, not met)  - pt will demonstrate LSI within 95% with hop testing for decreased reinjury risk (progressing, not met)    Plan     Continue per POC, focusing on extension range of motion and quad activation, and progressing as tolerated.       Donn Clifton, PT, DPT

## 2020-12-14 ENCOUNTER — PATIENT MESSAGE (OUTPATIENT)
Dept: INTERNAL MEDICINE | Facility: CLINIC | Age: 35
End: 2020-12-14

## 2020-12-15 ENCOUNTER — IMMUNIZATION (OUTPATIENT)
Dept: INTERNAL MEDICINE | Facility: CLINIC | Age: 35
End: 2020-12-15
Payer: COMMERCIAL

## 2020-12-15 ENCOUNTER — CLINICAL SUPPORT (OUTPATIENT)
Dept: REHABILITATION | Facility: HOSPITAL | Age: 35
End: 2020-12-15
Payer: COMMERCIAL

## 2020-12-15 DIAGNOSIS — R29.898 WEAKNESS OF RIGHT LOWER EXTREMITY: ICD-10-CM

## 2020-12-15 DIAGNOSIS — M25.661 STIFFNESS OF RIGHT KNEE: ICD-10-CM

## 2020-12-15 DIAGNOSIS — Z23 NEED FOR VACCINATION: ICD-10-CM

## 2020-12-15 PROCEDURE — 91300 COVID-19, MRNA, LNP-S, PF, 30 MCG/0.3 ML DOSE VACCINE: ICD-10-PCS | Mod: ,,, | Performed by: INTERNAL MEDICINE

## 2020-12-15 PROCEDURE — 97140 MANUAL THERAPY 1/> REGIONS: CPT

## 2020-12-15 PROCEDURE — 0001A COVID-19, MRNA, LNP-S, PF, 30 MCG/0.3 ML DOSE VACCINE: CPT | Mod: CV19,,, | Performed by: INTERNAL MEDICINE

## 2020-12-15 PROCEDURE — 97110 THERAPEUTIC EXERCISES: CPT

## 2020-12-15 PROCEDURE — 0001A COVID-19, MRNA, LNP-S, PF, 30 MCG/0.3 ML DOSE VACCINE: ICD-10-PCS | Mod: CV19,,, | Performed by: INTERNAL MEDICINE

## 2020-12-15 PROCEDURE — 97112 NEUROMUSCULAR REEDUCATION: CPT

## 2020-12-15 PROCEDURE — 91300 COVID-19, MRNA, LNP-S, PF, 30 MCG/0.3 ML DOSE VACCINE: CPT | Mod: ,,, | Performed by: INTERNAL MEDICINE

## 2020-12-16 NOTE — PROGRESS NOTES
Physical Therapy Daily Treatment Note     Name: Jovanni Kerr  Clinic Number: [id number]    Therapy Diagnosis:   Encounter Diagnoses   Name Primary?    Stiffness of right knee     Weakness of right lower extremity      Physician: Elvis Boss PA*    Visit Date: 12/15/2020  Physician Orders: PT Eval and Treat   Medical Diagnosis from Referral: M24.661 (ICD-10-CM) - Arthrofibrosis of knee joint, right  Evaluation Date: 10/12/2020  Authorization Period Expiration: 12/31/2020  Plan of Care Expiration: 4/31/2020  Visit # / Visits authorized: 16/ 30    Time In: 5PM  Time Out: 6PM  Total Billable Time: 50 minutes    Precautions: Standard    Subjective     Pt reports: States his knee is feeling fine at this date. 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    He was compliant with home exercise program.  Response to previous treatment: no adverse effects  Functional change: none    Pain:0/10  Location: right knee      Objective   ROM: 3/0/115    Jovanni received therapeutic exercises to develop strength, endurance, ROM and flexibility for 15 minutes including:     Prone gravity knee extension 10min  Heel slides into knee flexion 3x10s  Prone Knee Flexion 4x1min     Jovanni received the following manual therapy techniques: Joint mobilizations were applied to the: R knee for 8 minutes, including:    Patellar and fat pad mobilizations  Knee flexion ROM with medial/inferior patellar glides   EOT Knee Flexion    Jovanni participated in neuromuscular re-education activities to improve: Balance, Coordination, Kinesthetic, Sense and Proprioception for 25 minutes. The following activities were included:     Quad Sets 10x10s  RTB TKE in CKC NP  SLR 2x10,5s hold  Contract/Relax into knee flexion 3x5    Jovanni participated in gait training activities for 0 minutes including:    Home Exercises Provided and Patient Education Provided     Education provided:   -  importance of HEP , dynasplint adherence for  outcomes    Written Home Exercises Provided: Patient instructed to cont prior HEP.  Exercises were reviewed and Jovanni was able to demonstrate them prior to the end of the session.  Jovanni demonstrated good  understanding of the education provided.     See EMR under Patient Instructions for exercises provided prior visit.    Assessment     Patient made good improvement in his PROM knee flexion today which reached 115 degrees. However, he continues to require significant ROM interventions and overpressure to achieve this range. Focus of therapy will continue to emphasize improvement in ROM. His quad continues to improve its activation when he is able to achieve terminal knee extension.     Jovanni is progressing well towards his goals.   Pt prognosis is Fair.     Pt will continue to benefit from skilled outpatient physical therapy to address the deficits listed in the problem list box on initial evaluation, provide pt/family education and to maximize pt's level of independence in the home and community environment.     Pt's spiritual, cultural and educational needs considered and pt agreeable to plan of care and goals.    Anticipated barriers to physical therapy: COVID, schedule, previous failure of PT, hx of poor adherence    Goals:   Short Term Goals: 6 weeks   - amb 1 mile on level tile without pain provocation or need for rest (progressing, not met)  - do SLS >30 sec without LOB or pain (progressing, not met)  - do lateral step down of 6  without pain provocation and with  adequate single leg mechanics for progression to higher level activities (progressing, not met)  - pt will demonstrate full knee extension and knee flexion within 5 degrees compared to contralateral side for improved mobility (progressing, not met)        Long Term Goals: 6 months  - pt will be able to perform multidirectional running activities for return to prior level of function (progressing, not met)  - pt will demonstrate LSI within  95% with quadriceps and hamstring strength for decreased reinjury rate (progressing, not met)  - pt will demonstrate LSI within 95% with hop testing for decreased reinjury risk (progressing, not met)    Plan     Continue per POC, focusing on extension range of motion and quad activation, and progressing as tolerated.       Donn Clifton, PT, DPT

## 2020-12-17 ENCOUNTER — CLINICAL SUPPORT (OUTPATIENT)
Dept: REHABILITATION | Facility: HOSPITAL | Age: 35
End: 2020-12-17
Payer: COMMERCIAL

## 2020-12-17 DIAGNOSIS — R29.898 WEAKNESS OF RIGHT LOWER EXTREMITY: ICD-10-CM

## 2020-12-17 DIAGNOSIS — M25.661 STIFFNESS OF RIGHT KNEE: ICD-10-CM

## 2020-12-17 PROCEDURE — 97110 THERAPEUTIC EXERCISES: CPT

## 2020-12-17 PROCEDURE — 97140 MANUAL THERAPY 1/> REGIONS: CPT

## 2020-12-17 PROCEDURE — 97112 NEUROMUSCULAR REEDUCATION: CPT

## 2020-12-18 NOTE — PROGRESS NOTES
"    Physical Therapy Daily Treatment Note     Name: Jovanni Kerr  Clinic Number: [id number]    Therapy Diagnosis:   Encounter Diagnoses   Name Primary?    Stiffness of right knee     Weakness of right lower extremity      Physician: Elvis Boss PA*    Visit Date: 12/17/2020  Physician Orders: PT Eval and Treat   Medical Diagnosis from Referral: M24.661 (ICD-10-CM) - Arthrofibrosis of knee joint, right  Evaluation Date: 10/12/2020  Authorization Period Expiration: 12/31/2020  Plan of Care Expiration: 4/31/2020  Visit # / Visits authorized: 16/ 30    Time In: 6:15PM  Time Out: 7:10PM  Total Billable Time: 50 minutes    Precautions: Standard    Subjective     Pt reports:"My knee is feeling fine today"    He was compliant with home exercise program.  Response to previous treatment: no adverse effects  Functional change: none    Pain:0/10  Location: right knee      Objective   ROM: 3/0/117    Jovanni received therapeutic exercises to develop strength, endurance, ROM and flexibility for 25 minutes including:     Stationary Bike for AROM and aerobic endurance x6min  Prone gravity knee extension 6min 3#  Heel slides into knee flexion 20x10s  Prone Knee Flexion 4x1min NP  Step-ups/downs 6in box 2x10    Jovanni received the following manual therapy techniques: Joint mobilizations were applied to the: R knee for 8 minutes, including:    Patellar and fat pad mobilizations  Knee flexion ROM with medial/inferior patellar glides   EOT Knee Flexion NP    Jovanni participated in neuromuscular re-education activities to improve: Balance, Coordination, Kinesthetic, Sense and Proprioception for 15 minutes. The following activities were included:     Quad Sets 10x10s NP   RTB TKE in CKC  3x12, 5s   SLR 2x10,5s   Contract/Relax into knee flexion 3x5 NP    Jovanni participated in gait training activities for 0 minutes including:    Home Exercises Provided and Patient Education Provided     Education provided:   -  importance " of HEP , dynasplint adherence for outcomes    Written Home Exercises Provided: Patient instructed to cont prior HEP.  Exercises were reviewed and Jovanni was able to demonstrate them prior to the end of the session.  Jovanni demonstrated good  understanding of the education provided.     See EMR under Patient Instructions for exercises provided prior visit.    Assessment     Patient continues to make incremental flexion PROM improvements after therapeutic exercise and manual therapy interventions, particularly with a medial patellar glide. Focus of therapy continues to emphasize improving global ROM and decreasing PF and TF joint stiffness while maintaining currently available range of motion. Strength interventions performed were quad focused after achieving terminal knee extension.    Jovanni is progressing well towards his goals.   Pt prognosis is Fair.     Pt will continue to benefit from skilled outpatient physical therapy to address the deficits listed in the problem list box on initial evaluation, provide pt/family education and to maximize pt's level of independence in the home and community environment.     Pt's spiritual, cultural and educational needs considered and pt agreeable to plan of care and goals.    Anticipated barriers to physical therapy: COVID, schedule, previous failure of PT, hx of poor adherence    Goals:   Short Term Goals: 6 weeks   - amb 1 mile on level tile without pain provocation or need for rest (progressing, not met)  - do SLS >30 sec without LOB or pain (progressing, not met)  - do lateral step down of 6  without pain provocation and with  adequate single leg mechanics for progression to higher level activities (progressing, not met)  - pt will demonstrate full knee extension and knee flexion within 5 degrees compared to contralateral side for improved mobility (progressing, not met)        Long Term Goals: 6 months  - pt will be able to perform multidirectional running activities  for return to prior level of function (progressing, not met)  - pt will demonstrate LSI within 95% with quadriceps and hamstring strength for decreased reinjury rate (progressing, not met)  - pt will demonstrate LSI within 95% with hop testing for decreased reinjury risk (progressing, not met)    Plan     Continue per POC, focusing on extension range of motion and quad activation, and progressing as tolerated.       Donn Clifton, PT, DPT

## 2020-12-19 ENCOUNTER — TELEPHONE (OUTPATIENT)
Dept: SPORTS MEDICINE | Facility: CLINIC | Age: 35
End: 2020-12-19

## 2020-12-19 DIAGNOSIS — M25.661 KNEE STIFFNESS, RIGHT: Primary | ICD-10-CM

## 2020-12-19 RX ORDER — DICLOFENAC SODIUM 75 MG/1
75 TABLET, DELAYED RELEASE ORAL 2 TIMES DAILY
Qty: 60 TABLET | Refills: 2 | Status: SHIPPED | OUTPATIENT
Start: 2020-12-19 | End: 2021-01-19

## 2020-12-19 NOTE — TELEPHONE ENCOUNTER
Diclofenac script. Patient not currently on any NSAIDs. Continues to experience stiffness. Working through it in PT. Kelly progress.

## 2020-12-22 ENCOUNTER — CLINICAL SUPPORT (OUTPATIENT)
Dept: REHABILITATION | Facility: HOSPITAL | Age: 35
End: 2020-12-22
Payer: COMMERCIAL

## 2020-12-22 DIAGNOSIS — M25.661 STIFFNESS OF RIGHT KNEE: ICD-10-CM

## 2020-12-22 DIAGNOSIS — M24.661 ARTHROFIBROSIS OF KNEE JOINT, RIGHT: ICD-10-CM

## 2020-12-22 DIAGNOSIS — R29.898 WEAKNESS OF RIGHT LOWER EXTREMITY: ICD-10-CM

## 2020-12-22 PROCEDURE — 97140 MANUAL THERAPY 1/> REGIONS: CPT

## 2020-12-22 PROCEDURE — 97112 NEUROMUSCULAR REEDUCATION: CPT

## 2020-12-22 PROCEDURE — 97110 THERAPEUTIC EXERCISES: CPT

## 2020-12-22 NOTE — PROGRESS NOTES
"    Physical Therapy Daily Treatment Note     Name: Jovanni Kerr  Clinic Number: [id number]    Therapy Diagnosis:   Encounter Diagnoses   Name Primary?    Stiffness of right knee     Weakness of right lower extremity      Physician: Elvis Boss PA*    Visit Date: 12/22/2020  Physician Orders: PT Eval and Treat   Medical Diagnosis from Referral: M24.661 (ICD-10-CM) - Arthrofibrosis of knee joint, right  Evaluation Date: 10/12/2020  Authorization Period Expiration: 12/31/2020  Plan of Care Expiration: 4/31/2020  Visit # / Visits authorized: 16/ 30    Time In: 1640  Time Out: 1733  Total Billable Time: 40 minutes    Precautions: Standard    Subjective     Pt reports: His knee is stiff. He was on his feet in surgery all day today.     He was compliant with home exercise program.  Response to previous treatment: no adverse effects  Functional change: none    Pain:0/10  Location: right knee      Objective   ROM: 3/0/117    Jovanni received therapeutic exercises to develop strength, endurance, ROM and flexibility for 22 minutes including:     Stationary Bike for AROM and aerobic endurance x6min  Supine heel prop with overpressure x8 mins  Heel slides into knee flexion 20x10s  Step-ups/downs 6in box 2x10  EOT Knee Flexion x20      Jovanni received the following manual therapy techniques: Joint mobilizations were applied to the: R knee for 8 minutes, including:    Patellar and fat pad mobilizations  Knee flexion ROM with medial/inferior patellar glides       Jovanni participated in neuromuscular re-education activities to improve: Balance, Coordination, Kinesthetic, Sense and Proprioception for 12 minutes. The following activities were included:   Standing TKE RTB 2x10  Lat 6" step down 3x10      Jovanni participated in gait training activities for 0 minutes including:    Home Exercises Provided and Patient Education Provided     Education provided:   -  importance of HEP , dynasplint adherence for " "outcomes    Written Home Exercises Provided: Patient instructed to cont prior HEP.  Exercises were reviewed and Jovanni was able to demonstrate them prior to the end of the session.  Jovanni demonstrated good  understanding of the education provided.     See EMR under Patient Instructions for exercises provided prior visit.    Assessment     Continued limitation with knee flexion which is making slow incremental improvements. Patient demo'd lacking TKE at onset of today's treatment which improved following manual techniques and low load long duration stretching/mobilization. Progressed to 6" lateral heel taps with excellent tolerance. Pt required cues for correct technique including initiating step down with hip hinge.     Jovanni is progressing well towards his goals.   Pt prognosis is Fair.     Pt will continue to benefit from skilled outpatient physical therapy to address the deficits listed in the problem list box on initial evaluation, provide pt/family education and to maximize pt's level of independence in the home and community environment.     Pt's spiritual, cultural and educational needs considered and pt agreeable to plan of care and goals.    Anticipated barriers to physical therapy: COVID, schedule, previous failure of PT, hx of poor adherence    Goals:   Short Term Goals: 6 weeks   - amb 1 mile on level tile without pain provocation or need for rest (progressing, not met)  - do SLS >30 sec without LOB or pain (progressing, not met)  - do lateral step down of 6  without pain provocation and with  adequate single leg mechanics for progression to higher level activities (progressing, not met)  - pt will demonstrate full knee extension and knee flexion within 5 degrees compared to contralateral side for improved mobility (progressing, not met)        Long Term Goals: 6 months  - pt will be able to perform multidirectional running activities for return to prior level of function (progressing, not met)  - " pt will demonstrate LSI within 95% with quadriceps and hamstring strength for decreased reinjury rate (progressing, not met)  - pt will demonstrate LSI within 95% with hop testing for decreased reinjury risk (progressing, not met)    Plan     Continue per POC, focusing on extension range of motion and quad activation, and progressing as tolerated.       Michael Azar, PT, DPT

## 2020-12-24 ENCOUNTER — CLINICAL SUPPORT (OUTPATIENT)
Dept: REHABILITATION | Facility: HOSPITAL | Age: 35
End: 2020-12-24
Payer: COMMERCIAL

## 2020-12-24 DIAGNOSIS — R29.898 WEAKNESS OF RIGHT LOWER EXTREMITY: ICD-10-CM

## 2020-12-24 DIAGNOSIS — M25.661 STIFFNESS OF RIGHT KNEE: ICD-10-CM

## 2020-12-24 PROCEDURE — 97140 MANUAL THERAPY 1/> REGIONS: CPT

## 2020-12-24 PROCEDURE — 97110 THERAPEUTIC EXERCISES: CPT

## 2020-12-24 NOTE — PROGRESS NOTES
"    Physical Therapy Daily Treatment Note     Name: Jovanni Kerr  Clinic Number: [id number]    Therapy Diagnosis:   Encounter Diagnoses   Name Primary?    Stiffness of right knee     Weakness of right lower extremity      Physician: JOSEF Álvarez MD    Visit Date: 12/24/2020  Physician Orders: PT Eval and Treat   Medical Diagnosis from Referral: M24.661 (ICD-10-CM) - Arthrofibrosis of knee joint, right  Evaluation Date: 10/12/2020  Authorization Period Expiration: 12/31/2020  Plan of Care Expiration: 4/31/2020  Visit # / Visits authorized: 17/ 30    Time In: 0915  Time Out: 1000  Total Billable Time: 45 minutes    Precautions: Standard    Subjective     Pt reports: knee feeling stiff and somewhat painful.   He was compliant with home exercise program.  Response to previous treatment: no adverse effects  Functional change: none    Pain:0/10  Location: right knee      Ambulates to clinic with soft brace donned    Objective   ROM: 2/0/119    Jovanni received therapeutic exercises to develop strength, endurance, ROM and flexibility for 25 minutes including:   Leg press unilateral 60 lbs with DF at full extension 3x10   Mini lunges to vertical foam roll for eccentric quad control 3x10   Heel slides into knee flexion 20x10s  Stationary Bike for AROM and aerobic endurance x6min    Not performed today:  Supine heel prop with overpressure x8 mins  Step-ups/downs 6in box 2x10  EOT Knee Flexion x20      Jovanni received the following manual therapy techniques: Joint mobilizations were applied to the: R knee for 20 minutes, including:  IASTM distal quads, patellar tendon, shin and calf  Patellar and fat pad mobilizations  Knee flexion ROM with medial/inferior patellar glides       Jovanni participated in neuromuscular re-education activities to improve: Balance, Coordination, Kinesthetic, Sense and Proprioception for 0 minutes. The following activities were included:   Standing TKE RTB 2x10  Lat 6" step down " 3x10      Home Exercises Provided and Patient Education Provided     Education provided:   -  importance of HEP , dynasplint adherence for outcomes    Written Home Exercises Provided: Patient instructed to cont prior HEP.  Exercises were reviewed and Jovanni was able to demonstrate them prior to the end of the session.  Jovanni demonstrated good  understanding of the education provided.     See EMR under Patient Instructions for exercises provided prior visit.    Assessment   Achieved 119 PROM flexion s/p manual interventions. Good tolerance to uni leg press and mini lunge for eccentric quad control today.     Jovanni is progressing well towards his goals.   Pt prognosis is Fair.     Pt will continue to benefit from skilled outpatient physical therapy to address the deficits listed in the problem list box on initial evaluation, provide pt/family education and to maximize pt's level of independence in the home and community environment.     Pt's spiritual, cultural and educational needs considered and pt agreeable to plan of care and goals.    Anticipated barriers to physical therapy: COVID, schedule, previous failure of PT, hx of poor adherence    Goals:   Short Term Goals: 6 weeks   - amb 1 mile on level tile without pain provocation or need for rest (progressing, not met)  - do SLS >30 sec without LOB or pain (progressing, not met)  - do lateral step down of 6  without pain provocation and with  adequate single leg mechanics for progression to higher level activities (progressing, not met)  - pt will demonstrate full knee extension and knee flexion within 5 degrees compared to contralateral side for improved mobility (progressing, not met)        Long Term Goals: 6 months  - pt will be able to perform multidirectional running activities for return to prior level of function (progressing, not met)  - pt will demonstrate LSI within 95% with quadriceps and hamstring strength for decreased reinjury rate (progressing,  not met)  - pt will demonstrate LSI within 95% with hop testing for decreased reinjury risk (progressing, not met)    Plan     Continue per POC, focusing on extension range of motion and quad activation, and progressing as tolerated.       Bernardo Wheeler, PT, DPT

## 2020-12-28 ENCOUNTER — CLINICAL SUPPORT (OUTPATIENT)
Dept: REHABILITATION | Facility: HOSPITAL | Age: 35
End: 2020-12-28
Payer: COMMERCIAL

## 2020-12-28 DIAGNOSIS — R29.898 WEAKNESS OF RIGHT LOWER EXTREMITY: ICD-10-CM

## 2020-12-28 DIAGNOSIS — M25.661 STIFFNESS OF RIGHT KNEE: ICD-10-CM

## 2020-12-28 PROCEDURE — 97140 MANUAL THERAPY 1/> REGIONS: CPT

## 2020-12-28 PROCEDURE — 97110 THERAPEUTIC EXERCISES: CPT

## 2020-12-28 NOTE — PROGRESS NOTES
"  Physical Therapy Daily Treatment Note     Name: Jovanni Kerr  Clinic Number: [id number]    Therapy Diagnosis:   Encounter Diagnoses   Name Primary?    Stiffness of right knee     Weakness of right lower extremity      Physician: Elvis Boss PA*    Visit Date: 12/28/2020  Physician Orders: PT Eval and Treat   Medical Diagnosis from Referral: M24.661 (ICD-10-CM) - Arthrofibrosis of knee joint, right  Evaluation Date: 10/12/2020  Authorization Period Expiration: 12/31/2020  Plan of Care Expiration: 4/31/2020  Visit # / Visits authorized: 21/30    Time In: 5:45 PM  Time Out: 6:25 PM  Total Billable Time: 40 minutes    Precautions: Standard     PROCEDURES PERFORMED:   Right knee arthroscopic lysis of adhesions with manipulation under anesthesia (CPT 27475)  Right knee arthroscopic chondroplasty (CPT 09663)    Subjective     Pt reports: He's been using his flexion dynasplint regularly, however only feels a slight stretch; he will increase the number when he uses it tonight. Also reports he's continuing to have medial to lateral pain below his patellar at end range knee flexion.   He was compliant with home exercise program.  Response to previous treatment: no adverse effects  Functional change: none    Pain: 0/10 at rest; 5/10 at end range knee flexion  Location: right knee      Ambulates to clinic with soft brace donned.    Objective     Surgery Date: 10/9/2020. Patient is 11 weeks, 3 days post-op.    PROM: 2/0/120    Jovanni received therapeutic exercises to develop strength, endurance, ROM and flexibility for 15 minutes including:   Stationary Upright Bike for AROM and aerobic endurance x6min  Prone knee flexion w/ strap: 10"x10, with 1/2 foam prop  Leg press unilateral 60 lbs with DF at full extension 3x10     Not performed today:  Supine heel prop with overpressure x8 mins  Step-ups/downs 6in box 2x10  EOT Knee Flexion x20  Mini lunges to vertical foam roll for eccentric quad control 3x10   Heel slides " "into knee flexion 20x10s      Jovanni received the following manual therapy techniques: Joint mobilizations were applied to the: R knee for 25 minutes, including:  Patellar and fat pad mobilizations, emphasis on superior and inferior  Knee flexion ROM with medial/inferior patellar glides   Supine knee flexion with forearm stablization  Contract/relax in prone to improve knee flexion, 2 rounds      Jovanni participated in neuromuscular re-education activities to improve: Balance, Coordination, Kinesthetic, Sense and Proprioception for 0 minutes. The following activities were included:   Standing TKE RTB 2x10  Lat 6" step down 3x10      Home Exercises Provided and Patient Education Provided     Education provided:   -  importance of HEP , dynasplint adherence for outcomes    Written Home Exercises Provided: Patient instructed to cont prior HEP.  Exercises were reviewed and Jovanni was able to demonstrate them prior to the end of the session.  Jovanni demonstrated good  understanding of the education provided.     See EMR under Patient Instructions for exercises provided prior visit.    Assessment   Presented to therapy with 105 degrees of active knee flexion, improved to 120 PROM following manual therapy. Patient responded well to contract/relax in prone position, without adverse reactions. Educated patient on increasing dial on flexion dynasplint as he was not feeling a stretch at the current setting. Will continue to focus on improving knee flexion as able and as well as quad strength.     Jovanni is progressing well towards his goals.   Pt prognosis is Fair.     Pt will continue to benefit from skilled outpatient physical therapy to address the deficits listed in the problem list box on initial evaluation, provide pt/family education and to maximize pt's level of independence in the home and community environment.     Pt's spiritual, cultural and educational needs considered and pt agreeable to plan of care and " goals.    Anticipated barriers to physical therapy: COVID, schedule, previous failure of PT, hx of poor adherence    Goals:   Short Term Goals: 6 weeks   - amb 1 mile on level tile without pain provocation or need for rest (progressing, not met)  - do SLS >30 sec without LOB or pain (progressing, not met)  - do lateral step down of 6  without pain provocation and with  adequate single leg mechanics for progression to higher level activities (progressing, not met)  - pt will demonstrate full knee extension and knee flexion within 5 degrees compared to contralateral side for improved mobility (progressing, not met)        Long Term Goals: 6 months  - pt will be able to perform multidirectional running activities for return to prior level of function (progressing, not met)  - pt will demonstrate LSI within 95% with quadriceps and hamstring strength for decreased reinjury rate (progressing, not met)  - pt will demonstrate LSI within 95% with hop testing for decreased reinjury risk (progressing, not met)    Plan     Continue per POC, focusing on extension range of motion and quad activation, and progressing as tolerated.       Kamryn Garrett, PT, DPT

## 2020-12-29 ENCOUNTER — PATIENT MESSAGE (OUTPATIENT)
Dept: INTERNAL MEDICINE | Facility: CLINIC | Age: 35
End: 2020-12-29

## 2021-01-04 ENCOUNTER — PATIENT MESSAGE (OUTPATIENT)
Dept: ADMINISTRATIVE | Facility: HOSPITAL | Age: 36
End: 2021-01-04

## 2021-01-05 ENCOUNTER — CLINICAL SUPPORT (OUTPATIENT)
Dept: REHABILITATION | Facility: HOSPITAL | Age: 36
End: 2021-01-05
Payer: COMMERCIAL

## 2021-01-05 ENCOUNTER — OFFICE VISIT (OUTPATIENT)
Dept: SPORTS MEDICINE | Facility: CLINIC | Age: 36
End: 2021-01-05
Payer: COMMERCIAL

## 2021-01-05 VITALS
WEIGHT: 172 LBS | HEART RATE: 73 BPM | DIASTOLIC BLOOD PRESSURE: 71 MMHG | BODY MASS INDEX: 25.48 KG/M2 | HEIGHT: 69 IN | SYSTOLIC BLOOD PRESSURE: 117 MMHG

## 2021-01-05 DIAGNOSIS — M25.661 KNEE STIFFNESS, RIGHT: Primary | ICD-10-CM

## 2021-01-05 DIAGNOSIS — R29.898 WEAKNESS OF RIGHT LOWER EXTREMITY: ICD-10-CM

## 2021-01-05 DIAGNOSIS — M25.661 STIFFNESS OF RIGHT KNEE: ICD-10-CM

## 2021-01-05 PROCEDURE — 99999 PR PBB SHADOW E&M-EST. PATIENT-LVL III: CPT | Mod: PBBFAC,,, | Performed by: ORTHOPAEDIC SURGERY

## 2021-01-05 PROCEDURE — 3008F BODY MASS INDEX DOCD: CPT | Mod: CPTII,S$GLB,, | Performed by: ORTHOPAEDIC SURGERY

## 2021-01-05 PROCEDURE — 99999 PR PBB SHADOW E&M-EST. PATIENT-LVL III: ICD-10-PCS | Mod: PBBFAC,,, | Performed by: ORTHOPAEDIC SURGERY

## 2021-01-05 PROCEDURE — 97140 MANUAL THERAPY 1/> REGIONS: CPT

## 2021-01-05 PROCEDURE — 1126F AMNT PAIN NOTED NONE PRSNT: CPT | Mod: S$GLB,,, | Performed by: ORTHOPAEDIC SURGERY

## 2021-01-05 PROCEDURE — 1126F PR PAIN SEVERITY QUANTIFIED, NO PAIN PRESENT: ICD-10-PCS | Mod: S$GLB,,, | Performed by: ORTHOPAEDIC SURGERY

## 2021-01-05 PROCEDURE — 99213 PR OFFICE/OUTPT VISIT, EST, LEVL III, 20-29 MIN: ICD-10-PCS | Mod: 24,S$GLB,, | Performed by: ORTHOPAEDIC SURGERY

## 2021-01-05 PROCEDURE — 99213 OFFICE O/P EST LOW 20 MIN: CPT | Mod: 24,S$GLB,, | Performed by: ORTHOPAEDIC SURGERY

## 2021-01-05 PROCEDURE — 97110 THERAPEUTIC EXERCISES: CPT

## 2021-01-05 PROCEDURE — 3008F PR BODY MASS INDEX (BMI) DOCUMENTED: ICD-10-PCS | Mod: CPTII,S$GLB,, | Performed by: ORTHOPAEDIC SURGERY

## 2021-01-05 PROCEDURE — 97112 NEUROMUSCULAR REEDUCATION: CPT

## 2021-01-06 ENCOUNTER — IMMUNIZATION (OUTPATIENT)
Dept: INTERNAL MEDICINE | Facility: CLINIC | Age: 36
End: 2021-01-06
Payer: COMMERCIAL

## 2021-01-06 DIAGNOSIS — Z23 NEED FOR VACCINATION: ICD-10-CM

## 2021-01-06 PROCEDURE — 0002A COVID-19, MRNA, LNP-S, PF, 30 MCG/0.3 ML DOSE VACCINE: CPT | Mod: PBBFAC | Performed by: INTERNAL MEDICINE

## 2021-01-06 PROCEDURE — 91300 COVID-19, MRNA, LNP-S, PF, 30 MCG/0.3 ML DOSE VACCINE: CPT | Mod: PBBFAC | Performed by: INTERNAL MEDICINE

## 2021-01-07 ENCOUNTER — CLINICAL SUPPORT (OUTPATIENT)
Dept: REHABILITATION | Facility: HOSPITAL | Age: 36
End: 2021-01-07
Payer: COMMERCIAL

## 2021-01-07 DIAGNOSIS — M25.661 STIFFNESS OF RIGHT KNEE: ICD-10-CM

## 2021-01-07 DIAGNOSIS — R29.898 WEAKNESS OF RIGHT LOWER EXTREMITY: ICD-10-CM

## 2021-01-07 PROCEDURE — 97110 THERAPEUTIC EXERCISES: CPT

## 2021-01-07 PROCEDURE — 97140 MANUAL THERAPY 1/> REGIONS: CPT

## 2021-01-07 PROCEDURE — 97112 NEUROMUSCULAR REEDUCATION: CPT

## 2021-01-11 ENCOUNTER — CLINICAL SUPPORT (OUTPATIENT)
Dept: REHABILITATION | Facility: HOSPITAL | Age: 36
End: 2021-01-11
Payer: COMMERCIAL

## 2021-01-11 DIAGNOSIS — M25.661 STIFFNESS OF RIGHT KNEE: ICD-10-CM

## 2021-01-11 DIAGNOSIS — R29.898 WEAKNESS OF RIGHT LOWER EXTREMITY: ICD-10-CM

## 2021-01-11 PROCEDURE — 97140 MANUAL THERAPY 1/> REGIONS: CPT

## 2021-01-11 PROCEDURE — 97110 THERAPEUTIC EXERCISES: CPT

## 2021-01-21 ENCOUNTER — CLINICAL SUPPORT (OUTPATIENT)
Dept: REHABILITATION | Facility: HOSPITAL | Age: 36
End: 2021-01-21
Payer: COMMERCIAL

## 2021-01-21 DIAGNOSIS — M25.661 STIFFNESS OF RIGHT KNEE: ICD-10-CM

## 2021-01-21 DIAGNOSIS — R29.898 WEAKNESS OF RIGHT LOWER EXTREMITY: ICD-10-CM

## 2021-01-21 PROCEDURE — 97112 NEUROMUSCULAR REEDUCATION: CPT

## 2021-01-21 PROCEDURE — 97140 MANUAL THERAPY 1/> REGIONS: CPT

## 2021-01-26 ENCOUNTER — CLINICAL SUPPORT (OUTPATIENT)
Dept: REHABILITATION | Facility: HOSPITAL | Age: 36
End: 2021-01-26
Payer: COMMERCIAL

## 2021-01-26 DIAGNOSIS — R29.898 WEAKNESS OF RIGHT LOWER EXTREMITY: ICD-10-CM

## 2021-01-26 DIAGNOSIS — M25.661 STIFFNESS OF RIGHT KNEE: ICD-10-CM

## 2021-01-26 PROCEDURE — 97112 NEUROMUSCULAR REEDUCATION: CPT

## 2021-01-26 PROCEDURE — 97110 THERAPEUTIC EXERCISES: CPT

## 2021-01-26 PROCEDURE — 97140 MANUAL THERAPY 1/> REGIONS: CPT

## 2021-01-28 ENCOUNTER — CLINICAL SUPPORT (OUTPATIENT)
Dept: REHABILITATION | Facility: HOSPITAL | Age: 36
End: 2021-01-28
Payer: COMMERCIAL

## 2021-01-28 DIAGNOSIS — R29.898 WEAKNESS OF RIGHT LOWER EXTREMITY: ICD-10-CM

## 2021-01-28 DIAGNOSIS — M25.661 STIFFNESS OF RIGHT KNEE: ICD-10-CM

## 2021-01-28 PROCEDURE — 97110 THERAPEUTIC EXERCISES: CPT

## 2021-01-28 PROCEDURE — 97112 NEUROMUSCULAR REEDUCATION: CPT

## 2021-01-28 PROCEDURE — 97140 MANUAL THERAPY 1/> REGIONS: CPT

## 2021-02-02 ENCOUNTER — CLINICAL SUPPORT (OUTPATIENT)
Dept: REHABILITATION | Facility: HOSPITAL | Age: 36
End: 2021-02-02
Payer: COMMERCIAL

## 2021-02-02 DIAGNOSIS — M25.661 STIFFNESS OF RIGHT KNEE: ICD-10-CM

## 2021-02-02 DIAGNOSIS — R29.898 WEAKNESS OF RIGHT LOWER EXTREMITY: ICD-10-CM

## 2021-02-02 PROCEDURE — 97110 THERAPEUTIC EXERCISES: CPT

## 2021-02-02 PROCEDURE — 97112 NEUROMUSCULAR REEDUCATION: CPT

## 2021-02-02 PROCEDURE — 97140 MANUAL THERAPY 1/> REGIONS: CPT

## 2021-02-04 ENCOUNTER — CLINICAL SUPPORT (OUTPATIENT)
Dept: REHABILITATION | Facility: HOSPITAL | Age: 36
End: 2021-02-04
Payer: COMMERCIAL

## 2021-02-04 DIAGNOSIS — M25.661 STIFFNESS OF RIGHT KNEE: ICD-10-CM

## 2021-02-04 DIAGNOSIS — R29.898 WEAKNESS OF RIGHT LOWER EXTREMITY: ICD-10-CM

## 2021-02-04 PROCEDURE — 97112 NEUROMUSCULAR REEDUCATION: CPT

## 2021-02-04 PROCEDURE — 97140 MANUAL THERAPY 1/> REGIONS: CPT

## 2021-02-04 PROCEDURE — 97110 THERAPEUTIC EXERCISES: CPT

## 2021-02-09 ENCOUNTER — CLINICAL SUPPORT (OUTPATIENT)
Dept: REHABILITATION | Facility: HOSPITAL | Age: 36
End: 2021-02-09
Payer: COMMERCIAL

## 2021-02-09 DIAGNOSIS — R29.898 WEAKNESS OF RIGHT LOWER EXTREMITY: ICD-10-CM

## 2021-02-09 DIAGNOSIS — M25.661 STIFFNESS OF RIGHT KNEE: ICD-10-CM

## 2021-02-09 PROCEDURE — 97110 THERAPEUTIC EXERCISES: CPT

## 2021-02-09 PROCEDURE — 97140 MANUAL THERAPY 1/> REGIONS: CPT

## 2021-02-15 ENCOUNTER — CLINICAL SUPPORT (OUTPATIENT)
Dept: REHABILITATION | Facility: HOSPITAL | Age: 36
End: 2021-02-15
Payer: COMMERCIAL

## 2021-02-15 DIAGNOSIS — M25.661 STIFFNESS OF RIGHT KNEE: ICD-10-CM

## 2021-02-15 DIAGNOSIS — R29.898 WEAKNESS OF RIGHT LOWER EXTREMITY: ICD-10-CM

## 2021-02-15 PROCEDURE — 97110 THERAPEUTIC EXERCISES: CPT

## 2021-02-15 PROCEDURE — 97140 MANUAL THERAPY 1/> REGIONS: CPT

## 2021-02-18 ENCOUNTER — CLINICAL SUPPORT (OUTPATIENT)
Dept: REHABILITATION | Facility: HOSPITAL | Age: 36
End: 2021-02-18
Payer: COMMERCIAL

## 2021-02-18 DIAGNOSIS — M25.661 STIFFNESS OF RIGHT KNEE: ICD-10-CM

## 2021-02-18 DIAGNOSIS — R29.898 WEAKNESS OF RIGHT LOWER EXTREMITY: ICD-10-CM

## 2021-02-18 PROCEDURE — 97140 MANUAL THERAPY 1/> REGIONS: CPT

## 2021-02-18 PROCEDURE — 97110 THERAPEUTIC EXERCISES: CPT

## 2021-03-05 ENCOUNTER — CLINICAL SUPPORT (OUTPATIENT)
Dept: REHABILITATION | Facility: HOSPITAL | Age: 36
End: 2021-03-05
Payer: COMMERCIAL

## 2021-03-05 DIAGNOSIS — R29.898 WEAKNESS OF RIGHT LOWER EXTREMITY: ICD-10-CM

## 2021-03-05 DIAGNOSIS — M25.661 STIFFNESS OF RIGHT KNEE: ICD-10-CM

## 2021-03-05 PROCEDURE — 97140 MANUAL THERAPY 1/> REGIONS: CPT

## 2021-03-05 PROCEDURE — 97110 THERAPEUTIC EXERCISES: CPT

## 2021-03-05 PROCEDURE — 97112 NEUROMUSCULAR REEDUCATION: CPT

## 2021-03-09 ENCOUNTER — CLINICAL SUPPORT (OUTPATIENT)
Dept: REHABILITATION | Facility: HOSPITAL | Age: 36
End: 2021-03-09
Payer: COMMERCIAL

## 2021-03-09 DIAGNOSIS — R29.898 WEAKNESS OF RIGHT LOWER EXTREMITY: ICD-10-CM

## 2021-03-09 DIAGNOSIS — M25.661 STIFFNESS OF RIGHT KNEE: ICD-10-CM

## 2021-03-09 PROCEDURE — 97140 MANUAL THERAPY 1/> REGIONS: CPT

## 2021-03-09 PROCEDURE — 97112 NEUROMUSCULAR REEDUCATION: CPT

## 2021-03-09 PROCEDURE — 97110 THERAPEUTIC EXERCISES: CPT

## 2021-03-11 ENCOUNTER — CLINICAL SUPPORT (OUTPATIENT)
Dept: REHABILITATION | Facility: HOSPITAL | Age: 36
End: 2021-03-11
Payer: COMMERCIAL

## 2021-03-11 DIAGNOSIS — M25.661 STIFFNESS OF RIGHT KNEE: ICD-10-CM

## 2021-03-11 DIAGNOSIS — R29.898 WEAKNESS OF RIGHT LOWER EXTREMITY: ICD-10-CM

## 2021-03-11 PROCEDURE — 97110 THERAPEUTIC EXERCISES: CPT

## 2021-03-11 PROCEDURE — 97112 NEUROMUSCULAR REEDUCATION: CPT

## 2021-03-11 PROCEDURE — 97140 MANUAL THERAPY 1/> REGIONS: CPT

## 2021-03-17 ENCOUNTER — CLINICAL SUPPORT (OUTPATIENT)
Dept: REHABILITATION | Facility: HOSPITAL | Age: 36
End: 2021-03-17
Payer: COMMERCIAL

## 2021-03-17 DIAGNOSIS — M25.661 STIFFNESS OF RIGHT KNEE: ICD-10-CM

## 2021-03-17 DIAGNOSIS — R29.898 WEAKNESS OF RIGHT LOWER EXTREMITY: ICD-10-CM

## 2021-03-17 PROCEDURE — 97110 THERAPEUTIC EXERCISES: CPT

## 2021-03-17 PROCEDURE — 97112 NEUROMUSCULAR REEDUCATION: CPT

## 2021-03-17 PROCEDURE — 97140 MANUAL THERAPY 1/> REGIONS: CPT

## 2021-03-23 ENCOUNTER — TELEPHONE (OUTPATIENT)
Dept: SPORTS MEDICINE | Facility: CLINIC | Age: 36
End: 2021-03-23

## 2021-03-23 ENCOUNTER — CLINICAL SUPPORT (OUTPATIENT)
Dept: REHABILITATION | Facility: HOSPITAL | Age: 36
End: 2021-03-23
Payer: COMMERCIAL

## 2021-03-23 DIAGNOSIS — M25.661 STIFFNESS OF RIGHT KNEE: ICD-10-CM

## 2021-03-23 DIAGNOSIS — R29.898 WEAKNESS OF RIGHT LOWER EXTREMITY: ICD-10-CM

## 2021-03-23 PROCEDURE — 97140 MANUAL THERAPY 1/> REGIONS: CPT

## 2021-03-23 PROCEDURE — 97110 THERAPEUTIC EXERCISES: CPT

## 2021-03-23 PROCEDURE — 97112 NEUROMUSCULAR REEDUCATION: CPT

## 2021-03-25 ENCOUNTER — OFFICE VISIT (OUTPATIENT)
Dept: SPORTS MEDICINE | Facility: CLINIC | Age: 36
End: 2021-03-25
Payer: COMMERCIAL

## 2021-03-25 ENCOUNTER — TELEPHONE (OUTPATIENT)
Dept: SPORTS MEDICINE | Facility: CLINIC | Age: 36
End: 2021-03-25

## 2021-03-25 ENCOUNTER — CLINICAL SUPPORT (OUTPATIENT)
Dept: REHABILITATION | Facility: HOSPITAL | Age: 36
End: 2021-03-25
Payer: COMMERCIAL

## 2021-03-25 VITALS
SYSTOLIC BLOOD PRESSURE: 130 MMHG | HEIGHT: 69 IN | BODY MASS INDEX: 25.47 KG/M2 | WEIGHT: 171.94 LBS | HEART RATE: 63 BPM | DIASTOLIC BLOOD PRESSURE: 69 MMHG

## 2021-03-25 DIAGNOSIS — M25.661 KNEE STIFFNESS, RIGHT: Primary | ICD-10-CM

## 2021-03-25 DIAGNOSIS — R29.898 WEAKNESS OF RIGHT LOWER EXTREMITY: ICD-10-CM

## 2021-03-25 DIAGNOSIS — M25.661 STIFFNESS OF RIGHT KNEE: ICD-10-CM

## 2021-03-25 PROCEDURE — 97140 MANUAL THERAPY 1/> REGIONS: CPT

## 2021-03-25 PROCEDURE — 99213 PR OFFICE/OUTPT VISIT, EST, LEVL III, 20-29 MIN: ICD-10-PCS | Mod: S$GLB,,, | Performed by: ORTHOPAEDIC SURGERY

## 2021-03-25 PROCEDURE — 97110 THERAPEUTIC EXERCISES: CPT

## 2021-03-25 PROCEDURE — 3008F PR BODY MASS INDEX (BMI) DOCUMENTED: ICD-10-PCS | Mod: CPTII,S$GLB,, | Performed by: ORTHOPAEDIC SURGERY

## 2021-03-25 PROCEDURE — 1126F PR PAIN SEVERITY QUANTIFIED, NO PAIN PRESENT: ICD-10-PCS | Mod: S$GLB,,, | Performed by: ORTHOPAEDIC SURGERY

## 2021-03-25 PROCEDURE — 3008F BODY MASS INDEX DOCD: CPT | Mod: CPTII,S$GLB,, | Performed by: ORTHOPAEDIC SURGERY

## 2021-03-25 PROCEDURE — 1126F AMNT PAIN NOTED NONE PRSNT: CPT | Mod: S$GLB,,, | Performed by: ORTHOPAEDIC SURGERY

## 2021-03-25 PROCEDURE — 99999 PR PBB SHADOW E&M-EST. PATIENT-LVL III: CPT | Mod: PBBFAC,,, | Performed by: ORTHOPAEDIC SURGERY

## 2021-03-25 PROCEDURE — 99213 OFFICE O/P EST LOW 20 MIN: CPT | Mod: S$GLB,,, | Performed by: ORTHOPAEDIC SURGERY

## 2021-03-25 PROCEDURE — 99999 PR PBB SHADOW E&M-EST. PATIENT-LVL III: ICD-10-PCS | Mod: PBBFAC,,, | Performed by: ORTHOPAEDIC SURGERY

## 2021-04-01 ENCOUNTER — CLINICAL SUPPORT (OUTPATIENT)
Dept: REHABILITATION | Facility: HOSPITAL | Age: 36
End: 2021-04-01
Attending: PHYSICIAN ASSISTANT
Payer: COMMERCIAL

## 2021-04-01 DIAGNOSIS — M25.661 STIFFNESS OF RIGHT KNEE: ICD-10-CM

## 2021-04-01 DIAGNOSIS — R29.898 WEAKNESS OF RIGHT LOWER EXTREMITY: ICD-10-CM

## 2021-04-01 PROCEDURE — 97110 THERAPEUTIC EXERCISES: CPT

## 2021-04-01 PROCEDURE — 97112 NEUROMUSCULAR REEDUCATION: CPT

## 2021-04-01 PROCEDURE — 97140 MANUAL THERAPY 1/> REGIONS: CPT

## 2021-04-05 ENCOUNTER — PATIENT MESSAGE (OUTPATIENT)
Dept: ADMINISTRATIVE | Facility: HOSPITAL | Age: 36
End: 2021-04-05

## 2021-04-13 ENCOUNTER — TELEPHONE (OUTPATIENT)
Dept: INTERNAL MEDICINE | Facility: CLINIC | Age: 36
End: 2021-04-13

## 2021-04-13 ENCOUNTER — PATIENT MESSAGE (OUTPATIENT)
Dept: INTERNAL MEDICINE | Facility: CLINIC | Age: 36
End: 2021-04-13

## 2021-04-13 DIAGNOSIS — Z00.00 LABORATORY EXAM ORDERED AS PART OF ROUTINE GENERAL MEDICAL EXAMINATION: Primary | ICD-10-CM

## 2021-04-15 ENCOUNTER — LAB VISIT (OUTPATIENT)
Dept: LAB | Facility: HOSPITAL | Age: 36
End: 2021-04-15
Payer: COMMERCIAL

## 2021-04-15 DIAGNOSIS — Z00.00 LABORATORY EXAM ORDERED AS PART OF ROUTINE GENERAL MEDICAL EXAMINATION: ICD-10-CM

## 2021-04-15 LAB
ALBUMIN SERPL BCP-MCNC: 4.6 G/DL (ref 3.5–5.2)
ALP SERPL-CCNC: 58 U/L (ref 55–135)
ALT SERPL W/O P-5'-P-CCNC: 16 U/L (ref 10–44)
ANION GAP SERPL CALC-SCNC: 9 MMOL/L (ref 8–16)
AST SERPL-CCNC: 22 U/L (ref 10–40)
BASOPHILS # BLD AUTO: 0.03 K/UL (ref 0–0.2)
BASOPHILS NFR BLD: 0.5 % (ref 0–1.9)
BILIRUB SERPL-MCNC: 0.5 MG/DL (ref 0.1–1)
BUN SERPL-MCNC: 13 MG/DL (ref 6–20)
CALCIUM SERPL-MCNC: 9.5 MG/DL (ref 8.7–10.5)
CHLORIDE SERPL-SCNC: 103 MMOL/L (ref 95–110)
CHOLEST SERPL-MCNC: 160 MG/DL (ref 120–199)
CHOLEST/HDLC SERPL: 3.4 {RATIO} (ref 2–5)
CO2 SERPL-SCNC: 31 MMOL/L (ref 23–29)
CREAT SERPL-MCNC: 1.1 MG/DL (ref 0.5–1.4)
DIFFERENTIAL METHOD: ABNORMAL
EOSINOPHIL # BLD AUTO: 0.1 K/UL (ref 0–0.5)
EOSINOPHIL NFR BLD: 1 % (ref 0–8)
ERYTHROCYTE [DISTWIDTH] IN BLOOD BY AUTOMATED COUNT: 13 % (ref 11.5–14.5)
EST. GFR  (AFRICAN AMERICAN): >60 ML/MIN/1.73 M^2
EST. GFR  (NON AFRICAN AMERICAN): >60 ML/MIN/1.73 M^2
ESTIMATED AVG GLUCOSE: 94 MG/DL (ref 68–131)
GLUCOSE SERPL-MCNC: 74 MG/DL (ref 70–110)
HBA1C MFR BLD: 4.9 % (ref 4–5.6)
HCT VFR BLD AUTO: 46.1 % (ref 40–54)
HDLC SERPL-MCNC: 47 MG/DL (ref 40–75)
HDLC SERPL: 29.4 % (ref 20–50)
HGB BLD-MCNC: 14.4 G/DL (ref 14–18)
IMM GRANULOCYTES # BLD AUTO: 0.01 K/UL (ref 0–0.04)
IMM GRANULOCYTES NFR BLD AUTO: 0.2 % (ref 0–0.5)
LDLC SERPL CALC-MCNC: 104.6 MG/DL (ref 63–159)
LYMPHOCYTES # BLD AUTO: 1.5 K/UL (ref 1–4.8)
LYMPHOCYTES NFR BLD: 24.7 % (ref 18–48)
MCH RBC QN AUTO: 30.1 PG (ref 27–31)
MCHC RBC AUTO-ENTMCNC: 31.2 G/DL (ref 32–36)
MCV RBC AUTO: 96 FL (ref 82–98)
MONOCYTES # BLD AUTO: 0.3 K/UL (ref 0.3–1)
MONOCYTES NFR BLD: 4.7 % (ref 4–15)
NEUTROPHILS # BLD AUTO: 4.1 K/UL (ref 1.8–7.7)
NEUTROPHILS NFR BLD: 68.9 % (ref 38–73)
NONHDLC SERPL-MCNC: 113 MG/DL
NRBC BLD-RTO: 0 /100 WBC
PLATELET # BLD AUTO: 187 K/UL (ref 150–450)
PMV BLD AUTO: 12.4 FL (ref 9.2–12.9)
POTASSIUM SERPL-SCNC: 4.1 MMOL/L (ref 3.5–5.1)
PROT SERPL-MCNC: 8.2 G/DL (ref 6–8.4)
RBC # BLD AUTO: 4.79 M/UL (ref 4.6–6.2)
SODIUM SERPL-SCNC: 143 MMOL/L (ref 136–145)
TRIGL SERPL-MCNC: 42 MG/DL (ref 30–150)
TSH SERPL DL<=0.005 MIU/L-ACNC: 0.44 UIU/ML (ref 0.4–4)
WBC # BLD AUTO: 5.98 K/UL (ref 3.9–12.7)

## 2021-04-15 PROCEDURE — 84443 ASSAY THYROID STIM HORMONE: CPT | Performed by: PHYSICIAN ASSISTANT

## 2021-04-15 PROCEDURE — 36415 COLL VENOUS BLD VENIPUNCTURE: CPT | Performed by: PHYSICIAN ASSISTANT

## 2021-04-15 PROCEDURE — 80061 LIPID PANEL: CPT | Performed by: PHYSICIAN ASSISTANT

## 2021-04-15 PROCEDURE — 80053 COMPREHEN METABOLIC PANEL: CPT | Performed by: PHYSICIAN ASSISTANT

## 2021-04-15 PROCEDURE — 83036 HEMOGLOBIN GLYCOSYLATED A1C: CPT | Performed by: PHYSICIAN ASSISTANT

## 2021-04-15 PROCEDURE — 85025 COMPLETE CBC W/AUTO DIFF WBC: CPT | Performed by: PHYSICIAN ASSISTANT

## 2021-04-19 ENCOUNTER — CLINICAL SUPPORT (OUTPATIENT)
Dept: REHABILITATION | Facility: HOSPITAL | Age: 36
End: 2021-04-19
Payer: COMMERCIAL

## 2021-04-19 DIAGNOSIS — R29.898 WEAKNESS OF RIGHT LOWER EXTREMITY: ICD-10-CM

## 2021-04-19 DIAGNOSIS — M25.661 STIFFNESS OF RIGHT KNEE: ICD-10-CM

## 2021-04-19 PROCEDURE — 97530 THERAPEUTIC ACTIVITIES: CPT

## 2021-04-19 PROCEDURE — 97140 MANUAL THERAPY 1/> REGIONS: CPT

## 2021-04-19 PROCEDURE — 97110 THERAPEUTIC EXERCISES: CPT

## 2021-04-20 ENCOUNTER — OFFICE VISIT (OUTPATIENT)
Dept: INTERNAL MEDICINE | Facility: CLINIC | Age: 36
End: 2021-04-20
Payer: COMMERCIAL

## 2021-04-20 ENCOUNTER — LAB VISIT (OUTPATIENT)
Dept: LAB | Facility: HOSPITAL | Age: 36
End: 2021-04-20
Payer: COMMERCIAL

## 2021-04-20 VITALS
DIASTOLIC BLOOD PRESSURE: 60 MMHG | BODY MASS INDEX: 25.54 KG/M2 | HEIGHT: 70 IN | WEIGHT: 178.38 LBS | TEMPERATURE: 98 F | HEART RATE: 88 BPM | SYSTOLIC BLOOD PRESSURE: 115 MMHG | OXYGEN SATURATION: 97 % | RESPIRATION RATE: 16 BRPM

## 2021-04-20 DIAGNOSIS — Z00.00 ANNUAL PHYSICAL EXAM: Primary | ICD-10-CM

## 2021-04-20 DIAGNOSIS — Z11.1 SCREENING-PULMONARY TB: ICD-10-CM

## 2021-04-20 PROCEDURE — 99999 PR PBB SHADOW E&M-EST. PATIENT-LVL III: CPT | Mod: PBBFAC,,, | Performed by: PHYSICIAN ASSISTANT

## 2021-04-20 PROCEDURE — 36415 COLL VENOUS BLD VENIPUNCTURE: CPT | Performed by: PHYSICIAN ASSISTANT

## 2021-04-20 PROCEDURE — 1126F PR PAIN SEVERITY QUANTIFIED, NO PAIN PRESENT: ICD-10-PCS | Mod: S$GLB,,, | Performed by: PHYSICIAN ASSISTANT

## 2021-04-20 PROCEDURE — 99395 PR PREVENTIVE VISIT,EST,18-39: ICD-10-PCS | Mod: S$GLB,,, | Performed by: PHYSICIAN ASSISTANT

## 2021-04-20 PROCEDURE — 3008F BODY MASS INDEX DOCD: CPT | Mod: CPTII,S$GLB,, | Performed by: PHYSICIAN ASSISTANT

## 2021-04-20 PROCEDURE — 3008F PR BODY MASS INDEX (BMI) DOCUMENTED: ICD-10-PCS | Mod: CPTII,S$GLB,, | Performed by: PHYSICIAN ASSISTANT

## 2021-04-20 PROCEDURE — 86480 TB TEST CELL IMMUN MEASURE: CPT | Performed by: PHYSICIAN ASSISTANT

## 2021-04-20 PROCEDURE — 1126F AMNT PAIN NOTED NONE PRSNT: CPT | Mod: S$GLB,,, | Performed by: PHYSICIAN ASSISTANT

## 2021-04-20 PROCEDURE — 99999 PR PBB SHADOW E&M-EST. PATIENT-LVL III: ICD-10-PCS | Mod: PBBFAC,,, | Performed by: PHYSICIAN ASSISTANT

## 2021-04-20 PROCEDURE — 99395 PREV VISIT EST AGE 18-39: CPT | Mod: S$GLB,,, | Performed by: PHYSICIAN ASSISTANT

## 2021-04-22 LAB
GAMMA INTERFERON BACKGROUND BLD IA-ACNC: 0.02 IU/ML
M TB IFN-G CD4+ BCKGRND COR BLD-ACNC: 0 IU/ML
MITOGEN IGNF BCKGRD COR BLD-ACNC: 7.23 IU/ML
TB GOLD PLUS: NEGATIVE
TB2 - NIL: 0 IU/ML

## 2021-04-27 ENCOUNTER — CLINICAL SUPPORT (OUTPATIENT)
Dept: REHABILITATION | Facility: HOSPITAL | Age: 36
End: 2021-04-27
Payer: COMMERCIAL

## 2021-04-27 DIAGNOSIS — R29.898 WEAKNESS OF RIGHT LOWER EXTREMITY: ICD-10-CM

## 2021-04-27 DIAGNOSIS — M25.661 STIFFNESS OF RIGHT KNEE: ICD-10-CM

## 2021-04-27 PROCEDURE — 97112 NEUROMUSCULAR REEDUCATION: CPT

## 2021-05-18 ENCOUNTER — HOSPITAL ENCOUNTER (EMERGENCY)
Facility: HOSPITAL | Age: 36
Discharge: HOME | End: 2021-05-18
Attending: EMERGENCY MEDICINE
Payer: COMMERCIAL

## 2021-05-18 VITALS
TEMPERATURE: 98 F | DIASTOLIC BLOOD PRESSURE: 90 MMHG | OXYGEN SATURATION: 99 % | HEART RATE: 75 BPM | WEIGHT: 180 LBS | HEIGHT: 70 IN | SYSTOLIC BLOOD PRESSURE: 145 MMHG | BODY MASS INDEX: 25.77 KG/M2 | RESPIRATION RATE: 16 BRPM

## 2021-05-18 DIAGNOSIS — Z77.21 EXPOSURE TO BLOOD OR BODY FLUID: Primary | ICD-10-CM
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
## 2021-05-18 LAB
BASOPHILS # BLD AUTO: 0.03 K/UL (ref 0–0.2)
BASOPHILS NFR BLD: 0.6 % (ref 0–1.9)
DIFFERENTIAL METHOD: ABNORMAL
EOSINOPHIL # BLD AUTO: 0.1 K/UL (ref 0–0.5)
EOSINOPHIL NFR BLD: 2.2 % (ref 0–8)
ERYTHROCYTE [DISTWIDTH] IN BLOOD BY AUTOMATED COUNT: 12.9 % (ref 11.5–14.5)
HCT VFR BLD AUTO: 41.1 % (ref 40–54)
HGB BLD-MCNC: 13.2 G/DL (ref 14–18)
IMM GRANULOCYTES # BLD AUTO: 0.01 K/UL (ref 0–0.04)
IMM GRANULOCYTES NFR BLD AUTO: 0.2 % (ref 0–0.5)
LYMPHOCYTES # BLD AUTO: 1.5 K/UL (ref 1–4.8)
LYMPHOCYTES NFR BLD: 31 % (ref 18–48)
MCH RBC QN AUTO: 29.7 PG (ref 27–31)
MCHC RBC AUTO-ENTMCNC: 32.1 G/DL (ref 32–36)
MCV RBC AUTO: 93 FL (ref 82–98)
MONOCYTES # BLD AUTO: 0.3 K/UL (ref 0.3–1)
MONOCYTES NFR BLD: 6.7 % (ref 4–15)
NEUTROPHILS # BLD AUTO: 2.9 K/UL (ref 1.8–7.7)
NEUTROPHILS NFR BLD: 59.3 % (ref 38–73)
NRBC BLD-RTO: 0 /100 WBC
PLATELET # BLD AUTO: 195 K/UL (ref 150–450)
PMV BLD AUTO: 11.8 FL (ref 9.2–12.9)
RBC # BLD AUTO: 4.44 M/UL (ref 4.6–6.2)
WBC # BLD AUTO: 4.9 K/UL (ref 3.9–12.7)

## 2021-05-18 PROCEDURE — 86703 HIV-1/HIV-2 1 RESULT ANTBDY: CPT | Performed by: EMERGENCY MEDICINE

## 2021-05-18 PROCEDURE — 80053 COMPREHEN METABOLIC PANEL: CPT | Performed by: EMERGENCY MEDICINE

## 2021-05-18 PROCEDURE — 86705 HEP B CORE ANTIBODY IGM: CPT | Performed by: EMERGENCY MEDICINE

## 2021-05-18 PROCEDURE — 99284 EMERGENCY DEPT VISIT MOD MDM: CPT

## 2021-05-18 PROCEDURE — 85025 COMPLETE CBC W/AUTO DIFF WBC: CPT | Performed by: EMERGENCY MEDICINE

## 2021-05-18 PROCEDURE — 86706 HEP B SURFACE ANTIBODY: CPT | Performed by: EMERGENCY MEDICINE

## 2021-05-18 PROCEDURE — 99283 EMERGENCY DEPT VISIT LOW MDM: CPT | Mod: ,,, | Performed by: EMERGENCY MEDICINE

## 2021-05-18 PROCEDURE — 99283 PR EMERGENCY DEPT VISIT,LEVEL III: ICD-10-PCS | Mod: ,,, | Performed by: EMERGENCY MEDICINE

## 2021-05-18 PROCEDURE — 25000003 PHARM REV CODE 250: Performed by: EMERGENCY MEDICINE

## 2021-05-18 PROCEDURE — 86803 HEPATITIS C AB TEST: CPT | Performed by: EMERGENCY MEDICINE

## 2021-05-18 RX ORDER — TENOFOVIR DISOPROXIL FUMARATE 300 MG/1
300 TABLET, FILM COATED ORAL
Status: COMPLETED | OUTPATIENT
Start: 2021-05-18 | End: 2021-05-18

## 2021-05-18 RX ORDER — TENOFOVIR DISOPROXIL FUMARATE 300 MG/1
300 TABLET, FILM COATED ORAL DAILY
Qty: 3 TABLET | Refills: 0 | Status: SHIPPED | OUTPATIENT
Start: 2021-05-18 | End: 2021-05-21

## 2021-05-18 RX ORDER — EMTRICITABINE 200 MG/1
200 CAPSULE ORAL DAILY
Qty: 3 CAPSULE | Refills: 0 | Status: SHIPPED | OUTPATIENT
Start: 2021-05-18 | End: 2021-05-21

## 2021-05-18 RX ORDER — EMTRICITABINE 200 MG/1
200 CAPSULE ORAL
Status: COMPLETED | OUTPATIENT
Start: 2021-05-18 | End: 2021-05-18

## 2021-05-18 RX ADMIN — RALTEGRAVIR 400 MG: 400 TABLET, FILM COATED ORAL at 11:05

## 2021-05-18 RX ADMIN — EMTRICITABINE 200 MG: 200 CAPSULE ORAL at 11:05

## 2021-05-18 RX ADMIN — TENOFOVIR DISPROXIL FUMARATE 300 MG: 300 TABLET ORAL at 11:05

## 2021-05-19 LAB
ALBUMIN SERPL BCP-MCNC: 4.2 G/DL (ref 3.5–5.2)
ALP SERPL-CCNC: 61 U/L (ref 55–135)
ALT SERPL W/O P-5'-P-CCNC: 14 U/L (ref 10–44)
ANION GAP SERPL CALC-SCNC: 8 MMOL/L (ref 8–16)
AST SERPL-CCNC: 18 U/L (ref 10–40)
BILIRUB SERPL-MCNC: 0.3 MG/DL (ref 0.1–1)
BUN SERPL-MCNC: 11 MG/DL (ref 6–20)
CALCIUM SERPL-MCNC: 9.5 MG/DL (ref 8.7–10.5)
CHLORIDE SERPL-SCNC: 105 MMOL/L (ref 95–110)
CO2 SERPL-SCNC: 25 MMOL/L (ref 23–29)
CREAT SERPL-MCNC: 1 MG/DL (ref 0.5–1.4)
EST. GFR  (AFRICAN AMERICAN): >60 ML/MIN/1.73 M^2
EST. GFR  (NON AFRICAN AMERICAN): >60 ML/MIN/1.73 M^2
GLUCOSE SERPL-MCNC: 122 MG/DL (ref 70–110)
HBV CORE IGM SERPL QL IA: NEGATIVE
HBV SURFACE AB SER-ACNC: POSITIVE M[IU]/ML
HCV AB SERPL QL IA: NEGATIVE
HCV AB SERPL QL IA: NEGATIVE
HIV 1+2 AB+HIV1 P24 AG SERPL QL IA: NEGATIVE
HIV 1+2 AB+HIV1 P24 AG SERPL QL IA: NEGATIVE
POTASSIUM SERPL-SCNC: 3.4 MMOL/L (ref 3.5–5.1)
PROT SERPL-MCNC: 7.5 G/DL (ref 6–8.4)
SODIUM SERPL-SCNC: 138 MMOL/L (ref 136–145)

## 2021-05-26 ENCOUNTER — TELEPHONE (OUTPATIENT)
Dept: SPORTS MEDICINE | Facility: CLINIC | Age: 36
End: 2021-05-26

## 2021-09-07 ENCOUNTER — PATIENT MESSAGE (OUTPATIENT)
Dept: INTERNAL MEDICINE | Facility: CLINIC | Age: 36
End: 2021-09-07

## 2022-07-15 NOTE — PROGRESS NOTES
"  Physical Therapy Daily Treatment Note     Name: Jovanni Kerr  Clinic Number: [id number]    Therapy Diagnosis:   Encounter Diagnosis   Name Primary?    Acute pain of right knee      Physician: JOSEF Álvarez MD    Visit Date: 2/24/2020  Physician Orders: PT Eval and Treat   Medical Diagnosis from Referral: S83.511A (ICD-10-CM) - Rupture of anterior cruciate ligament of right knee, initial encounter  Evaluation Date: 11/19/2019  Authorization Period Expiration: 12/31/2020  Plan of Care Expiration: 5/5/2020  Visit # / Visits authorized: 9/ 30  Visit # total: 19     Time In: 1110  Time Out: 1200  Total Billable Time: 30 minutes     Precautions: Standard, Patient will weight bear as tolerates with the brace locked in extension. Range of motion may be full. Plan to follow an ACL autograft rehab protocol. Initial goals include maintenance of full knee extension, regaining flexion, swelling control, and quadriceps activation exercises. May discontinue the brace was the quad is firing well. Expected release for sports no earlier than 8-9 months following Sport Cord testing.        PROCEDURES PERFORMED on 11/18/19:   Right knee arthroscopic bone patellar tendon bone autograft ACL reconstruction (CPT 17274)  Right knee arthroscopic lateral plica band resection (CPT 74955)  Right knee arthroscopic partial lateral release (CPT 86156)    Subjective     Pt reports: his knee is still less sore than the other week.    He was compliant with home exercise program.  Response to previous treatment: no adverse effects  Functional change: Pt ambulating with soft knee brace.    Pain: 1/10  Location: right knee     Objective     2/24/2020  ROM: X-0-115 with sig over pressure to ext and flexion   Hypomobile fat pad (severe), hypomobile inf/sup patella mob    Jovanni received therapeutic exercises to develop strength, endurance, ROM and flexibility for 30 minutes including  Prone hang 5# at joint line inf x10m   Step ups 4x10 6" "   Shuttle SL 2 cords 5x5     Jovanni participated in dynamic functional therapeutic activities to improve functional performance for 0  minutes, including:      Jovanni participated in neuromuscular re-education activities to improve: motor control for 25 minutes. The following activities were included:  SAQ 10x10s   TKE with mini squats 4x10    Jovanni received the following manual therapy techniques: Joint mobilizations were applied to the: R knee for 10 minutes, including:  AP femoral mobs  Fat pad mobs long sitting and in knee flexion  Patella mobs long sitting and in slight knee flexion  Supine knee distraction grade I-II   Sup patella mobs/inf patella mobs grade III- IV      Home Exercises Provided and Patient Education Provided     Education provided:   - proper gait   - foam rolling to dec ms stiffness perception     Written Home Exercises Provided: Patient instructed to cont prior HEP.Pt   Exercises were reviewed and Jovanni was able to demonstrate them prior to the end of the session.  Jovanni demonstrated good  understanding of the education provided.     See EMR under Media for exercises provided at IE.    Assessment     The pt with cont hypomobility of ant structures which is limiting his ability to achieve end range extension and flexion. The pt with fair to good tolerance to therex. Focused on extension, quad control and general LE strengthening.     Extender used throughout treatment: Kindra Fontenot ATC, LAT    Jovanni is progressing well towards his goals.   Pt prognosis is Good.     Pt will continue to benefit from skilled outpatient physical therapy to address the deficits listed in the problem list box on initial evaluation, provide pt/family education and to maximize pt's level of independence in the home and community environment.     Pt's spiritual, cultural and educational needs considered and pt agreeable to plan of care and goals.    Anticipated barriers to physical therapy: work  schedule    GOALS: Short Term Goals:  12 weeks  1.Report decreased R knee pain  < / =  2/10  to increase tolerance for walking/stairs Progressing, not met  2. Increase knee ROM to 5 hyperextension to 135 degrees of knee flexion in order to be able to perform ADLs without difficulty. Progressing, not met  3. Increase strength to 4-/5 MMT grade in R LE  to increase tolerance for ADL and work activities. Progressing, not met  4. Pt to tolerate HEP to improve ROM and independence with ADL's Progressing, not met     Long Term Goals: 24 weeks  1.Report decreased R knee pain < / = 0/10  to increase tolerance for running, biking and swimming. Progressing, not met  2.Patient goal: To be able to return to recreational activities - running, biking, swimming. Progressing, not met  3.Increase strength to >/= 4+/5 in R LE  to increase tolerance for ADL and work activities. Progressing, not met  4. Pt will be able to perform SL squat and lateral bounding without difficulty or compensation in order to return to running. Progressing, not met    Plan     Continue with established Plan of Care towards PT goals, per ACL BTB autograft protocol (Dr. Álvarez).    Brenda Royal, PT, DPT   139.9

## (undated) DEVICE — DRESSING XEROFORM 1X8IN

## (undated) DEVICE — BRACE KNEE T SCOPE PREMIER

## (undated) DEVICE — BIT DRILL CANNULATED 10MM

## (undated) DEVICE — DRAPE EMERALD 87X114.75X113

## (undated) DEVICE — SEE MEDLINE ITEM 157150

## (undated) DEVICE — APPLICATOR CHLORAPREP ORN 26ML

## (undated) DEVICE — TOURNIQUET SB QC DP 34X4IN

## (undated) DEVICE — NDL 18GA X1 1/2 REG BEVEL

## (undated) DEVICE — DRESSING TELFA N ADH 3X8

## (undated) DEVICE — TRAY ARTHROSCOPY 2/CS

## (undated) DEVICE — ELECTRODE REM PLYHSV RETURN 9

## (undated) DEVICE — GLOVE BIOGEL SKINSENSE PI 8.0

## (undated) DEVICE — PUMP COLD THERAPY

## (undated) DEVICE — BLADE SHAVER 4.5 6/BX

## (undated) DEVICE — Device

## (undated) DEVICE — GOWN SMART IMP BREATHABLE XXLG

## (undated) DEVICE — DRESSING XEROFORM FOIL PK 1X8

## (undated) DEVICE — PAD ABD 8X10 STERILE

## (undated) DEVICE — PAD COLD THERAPY KNEE WRAP ON

## (undated) DEVICE — SHAVER SYS 5.5 ULRAFRR

## (undated) DEVICE — BANDAGE ACE ELASTIC 6"

## (undated) DEVICE — NDL SPINAL 18GX3.5 SPINOCAN

## (undated) DEVICE — BLADE SHAVER LANZA 4.2X13CM

## (undated) DEVICE — TUBE SET INFLOW/OUTFLOW

## (undated) DEVICE — SEE MEDLINE ITEM 146313

## (undated) DEVICE — SEE MEDLINE ITEM 152487

## (undated) DEVICE — SEE MEDLINE ITEM 146231

## (undated) DEVICE — SEE MEDLINE ITEM 146292

## (undated) DEVICE — GAUZE SPONGE 4X4 12PLY

## (undated) DEVICE — SYS CLSR DERMABOND PRINEO 22CM

## (undated) DEVICE — SYR 30CC LUER LOCK

## (undated) DEVICE — DRAPE STERI INSTRUMENT 1018

## (undated) DEVICE — CUP MEDICINE STERILE 2OZ

## (undated) DEVICE — BLADE KNIFE GRAFT10MM PARALLEL

## (undated) DEVICE — PAD CAST SPECIALIST STRL 6

## (undated) DEVICE — SCALPEL #15 BLADE STRL DISP.

## (undated) DEVICE — SUT VICRYL PLUS 3-0 SH 18IN

## (undated) DEVICE — SUT FIBERWIRE 2 50IN BLUE

## (undated) DEVICE — SUT ETHICON 3-0 BLK MONO PS

## (undated) DEVICE — UNDERGLOVES BIOGEL PI SIZE 8.5

## (undated) DEVICE — CUTTER FLIPCUTTER II 10MM ST

## (undated) DEVICE — SUT BLU BR 2 TAPERD NDL 1/2

## (undated) DEVICE — SUT VICRYL CTD 2-0 GI 27 SH

## (undated) DEVICE — PROBE ARTHSCP EDGE ENERGY 50

## (undated) DEVICE — SEE MEDLINE ITEM 146347

## (undated) DEVICE — DRAPE PLASTIC U 60X72

## (undated) DEVICE — SUT 0 VICRYL / CT-1

## (undated) DEVICE — PENCIL ROCKER SWITCH 10FT CORD

## (undated) DEVICE — BLADE SAGITTA 5/BX

## (undated) DEVICE — SOL IRR NACL .9% 3000ML

## (undated) DEVICE — SUT MONOCRYL 3-0 PS-1

## (undated) DEVICE — BLADE #15 STERILE CARBON

## (undated) DEVICE — DRESSING GAUZE 6PLY 4X4

## (undated) DEVICE — SEE MEDLINE ITEM 157131

## (undated) DEVICE — SCALPEL #10 BLADE STRL DISP

## (undated) DEVICE — KIT ACL DISP W/O SAW BLADE